# Patient Record
Sex: FEMALE | Race: WHITE | NOT HISPANIC OR LATINO | URBAN - METROPOLITAN AREA
[De-identification: names, ages, dates, MRNs, and addresses within clinical notes are randomized per-mention and may not be internally consistent; named-entity substitution may affect disease eponyms.]

---

## 2021-12-27 ENCOUNTER — INPATIENT (INPATIENT)
Facility: HOSPITAL | Age: 86
LOS: 14 days | Discharge: NURSING, REHAB OR LONG-TERM CARE | DRG: 177 | End: 2022-01-11
Attending: INTERNAL MEDICINE | Admitting: INTERNAL MEDICINE
Payer: MEDICARE

## 2021-12-27 VITALS
SYSTOLIC BLOOD PRESSURE: 165 MMHG | RESPIRATION RATE: 20 BRPM | HEIGHT: 61 IN | DIASTOLIC BLOOD PRESSURE: 94 MMHG | HEART RATE: 108 BPM | TEMPERATURE: 99 F | OXYGEN SATURATION: 91 % | WEIGHT: 179.9 LBS

## 2021-12-27 DIAGNOSIS — R09.02 HYPOXEMIA: ICD-10-CM

## 2021-12-27 PROBLEM — Z00.00 ENCOUNTER FOR PREVENTIVE HEALTH EXAMINATION: Status: ACTIVE | Noted: 2021-12-27

## 2021-12-27 LAB
ALBUMIN SERPL ELPH-MCNC: 3.2 G/DL — LOW (ref 3.3–5)
ALP SERPL-CCNC: 66 U/L — SIGNIFICANT CHANGE UP (ref 40–120)
ALT FLD-CCNC: 25 U/L — SIGNIFICANT CHANGE UP (ref 10–45)
ANION GAP SERPL CALC-SCNC: 14 MMOL/L — SIGNIFICANT CHANGE UP (ref 5–17)
APTT BLD: 26.3 SEC — LOW (ref 27.5–35.5)
AST SERPL-CCNC: 48 U/L — HIGH (ref 10–40)
BASE EXCESS BLDV CALC-SCNC: 3.2 MMOL/L — HIGH (ref -2–2)
BASOPHILS # BLD AUTO: 0.02 K/UL — SIGNIFICANT CHANGE UP (ref 0–0.2)
BASOPHILS NFR BLD AUTO: 0.2 % — SIGNIFICANT CHANGE UP (ref 0–2)
BILIRUB SERPL-MCNC: 0.5 MG/DL — SIGNIFICANT CHANGE UP (ref 0.2–1.2)
BUN SERPL-MCNC: 20 MG/DL — SIGNIFICANT CHANGE UP (ref 7–23)
CA-I SERPL-SCNC: 1.17 MMOL/L — SIGNIFICANT CHANGE UP (ref 1.15–1.33)
CALCIUM SERPL-MCNC: 9 MG/DL — SIGNIFICANT CHANGE UP (ref 8.4–10.5)
CHLORIDE BLDV-SCNC: 100 MMOL/L — SIGNIFICANT CHANGE UP (ref 96–108)
CHLORIDE SERPL-SCNC: 101 MMOL/L — SIGNIFICANT CHANGE UP (ref 96–108)
CO2 BLDV-SCNC: 30 MMOL/L — HIGH (ref 22–26)
CO2 SERPL-SCNC: 22 MMOL/L — SIGNIFICANT CHANGE UP (ref 22–31)
CREAT SERPL-MCNC: 0.88 MG/DL — SIGNIFICANT CHANGE UP (ref 0.5–1.3)
CRP SERPL-MCNC: 133 MG/L — HIGH (ref 0–4)
D DIMER BLD IA.RAPID-MCNC: 1014 NG/ML DDU — HIGH
D DIMER BLD IA.RAPID-MCNC: 861 NG/ML DDU — HIGH
EOSINOPHIL # BLD AUTO: 0.01 K/UL — SIGNIFICANT CHANGE UP (ref 0–0.5)
EOSINOPHIL NFR BLD AUTO: 0.1 % — SIGNIFICANT CHANGE UP (ref 0–6)
GAS PNL BLDV: 130 MMOL/L — LOW (ref 136–145)
GAS PNL BLDV: SIGNIFICANT CHANGE UP
GAS PNL BLDV: SIGNIFICANT CHANGE UP
GLUCOSE BLDV-MCNC: 106 MG/DL — HIGH (ref 70–99)
GLUCOSE SERPL-MCNC: 107 MG/DL — HIGH (ref 70–99)
HCO3 BLDV-SCNC: 28 MMOL/L — SIGNIFICANT CHANGE UP (ref 22–29)
HCT VFR BLD CALC: 41.5 % — SIGNIFICANT CHANGE UP (ref 34.5–45)
HCT VFR BLDA CALC: 43 % — SIGNIFICANT CHANGE UP (ref 34.5–46.5)
HGB BLD CALC-MCNC: 14.4 G/DL — SIGNIFICANT CHANGE UP (ref 11.7–16.1)
HGB BLD-MCNC: 13.4 G/DL — SIGNIFICANT CHANGE UP (ref 11.5–15.5)
IMM GRANULOCYTES NFR BLD AUTO: 0.7 % — SIGNIFICANT CHANGE UP (ref 0–1.5)
INR BLD: 1.09 RATIO — SIGNIFICANT CHANGE UP (ref 0.88–1.16)
LACTATE BLDV-MCNC: 2.2 MMOL/L — HIGH (ref 0.7–2)
LYMPHOCYTES # BLD AUTO: 1.19 K/UL — SIGNIFICANT CHANGE UP (ref 1–3.3)
LYMPHOCYTES # BLD AUTO: 13.2 % — SIGNIFICANT CHANGE UP (ref 13–44)
MCHC RBC-ENTMCNC: 29.6 PG — SIGNIFICANT CHANGE UP (ref 27–34)
MCHC RBC-ENTMCNC: 32.3 GM/DL — SIGNIFICANT CHANGE UP (ref 32–36)
MCV RBC AUTO: 91.6 FL — SIGNIFICANT CHANGE UP (ref 80–100)
MONOCYTES # BLD AUTO: 0.78 K/UL — SIGNIFICANT CHANGE UP (ref 0–0.9)
MONOCYTES NFR BLD AUTO: 8.6 % — SIGNIFICANT CHANGE UP (ref 2–14)
NEUTROPHILS # BLD AUTO: 6.96 K/UL — SIGNIFICANT CHANGE UP (ref 1.8–7.4)
NEUTROPHILS NFR BLD AUTO: 77.2 % — HIGH (ref 43–77)
NRBC # BLD: 0 /100 WBCS — SIGNIFICANT CHANGE UP (ref 0–0)
NT-PROBNP SERPL-SCNC: 169 PG/ML — SIGNIFICANT CHANGE UP (ref 0–300)
NT-PROBNP SERPL-SCNC: 185 PG/ML — SIGNIFICANT CHANGE UP (ref 0–300)
PCO2 BLDV: 45 MMHG — HIGH (ref 39–42)
PH BLDV: 7.41 — SIGNIFICANT CHANGE UP (ref 7.32–7.43)
PLATELET # BLD AUTO: 190 K/UL — SIGNIFICANT CHANGE UP (ref 150–400)
PO2 BLDV: 20 MMHG — LOW (ref 25–45)
POTASSIUM BLDV-SCNC: 3.9 MMOL/L — SIGNIFICANT CHANGE UP (ref 3.5–5.1)
POTASSIUM SERPL-MCNC: 3.8 MMOL/L — SIGNIFICANT CHANGE UP (ref 3.5–5.3)
POTASSIUM SERPL-SCNC: 3.8 MMOL/L — SIGNIFICANT CHANGE UP (ref 3.5–5.3)
PROCALCITONIN SERPL-MCNC: 0.17 NG/ML — HIGH (ref 0.02–0.1)
PROT SERPL-MCNC: 6.8 G/DL — SIGNIFICANT CHANGE UP (ref 6–8.3)
PROTHROM AB SERPL-ACNC: 13 SEC — SIGNIFICANT CHANGE UP (ref 10.6–13.6)
RAPID RVP RESULT: DETECTED
RBC # BLD: 4.53 M/UL — SIGNIFICANT CHANGE UP (ref 3.8–5.2)
RBC # FLD: 14.4 % — SIGNIFICANT CHANGE UP (ref 10.3–14.5)
SAO2 % BLDV: 37.1 % — LOW (ref 67–88)
SARS-COV-2 RNA SPEC QL NAA+PROBE: DETECTED
SODIUM SERPL-SCNC: 137 MMOL/L — SIGNIFICANT CHANGE UP (ref 135–145)
TROPONIN T, HIGH SENSITIVITY RESULT: 31 NG/L — SIGNIFICANT CHANGE UP (ref 0–51)
WBC # BLD: 9.02 K/UL — SIGNIFICANT CHANGE UP (ref 3.8–10.5)
WBC # FLD AUTO: 9.02 K/UL — SIGNIFICANT CHANGE UP (ref 3.8–10.5)

## 2021-12-27 PROCEDURE — 93010 ELECTROCARDIOGRAM REPORT: CPT

## 2021-12-27 PROCEDURE — 71250 CT THORAX DX C-: CPT | Mod: 26

## 2021-12-27 PROCEDURE — 99285 EMERGENCY DEPT VISIT HI MDM: CPT | Mod: CS

## 2021-12-27 PROCEDURE — 71045 X-RAY EXAM CHEST 1 VIEW: CPT | Mod: 26

## 2021-12-27 RX ORDER — ALBUTEROL 90 UG/1
1 AEROSOL, METERED ORAL
Refills: 0 | Status: DISCONTINUED | OUTPATIENT
Start: 2021-12-27 | End: 2021-12-28

## 2021-12-27 RX ORDER — REMDESIVIR 5 MG/ML
100 INJECTION INTRAVENOUS EVERY 24 HOURS
Refills: 0 | Status: COMPLETED | OUTPATIENT
Start: 2021-12-28 | End: 2021-12-31

## 2021-12-27 RX ORDER — DEXAMETHASONE 0.5 MG/5ML
6 ELIXIR ORAL DAILY
Refills: 0 | Status: DISCONTINUED | OUTPATIENT
Start: 2021-12-28 | End: 2022-01-09

## 2021-12-27 RX ORDER — ENOXAPARIN SODIUM 100 MG/ML
40 INJECTION SUBCUTANEOUS EVERY 12 HOURS
Refills: 0 | Status: DISCONTINUED | OUTPATIENT
Start: 2021-12-27 | End: 2022-01-11

## 2021-12-27 RX ORDER — ENOXAPARIN SODIUM 100 MG/ML
40 INJECTION SUBCUTANEOUS DAILY
Refills: 0 | Status: DISCONTINUED | OUTPATIENT
Start: 2021-12-27 | End: 2021-12-27

## 2021-12-27 RX ORDER — REMDESIVIR 5 MG/ML
INJECTION INTRAVENOUS
Refills: 0 | Status: COMPLETED | OUTPATIENT
Start: 2021-12-28 | End: 2021-12-31

## 2021-12-27 RX ORDER — DEXAMETHASONE 0.5 MG/5ML
6 ELIXIR ORAL ONCE
Refills: 0 | Status: COMPLETED | OUTPATIENT
Start: 2021-12-27 | End: 2021-12-27

## 2021-12-27 RX ORDER — REMDESIVIR 5 MG/ML
200 INJECTION INTRAVENOUS EVERY 24 HOURS
Refills: 0 | Status: COMPLETED | OUTPATIENT
Start: 2021-12-27 | End: 2021-12-27

## 2021-12-27 RX ORDER — BUDESONIDE AND FORMOTEROL FUMARATE DIHYDRATE 160; 4.5 UG/1; UG/1
2 AEROSOL RESPIRATORY (INHALATION)
Refills: 0 | Status: DISCONTINUED | OUTPATIENT
Start: 2021-12-27 | End: 2022-01-10

## 2021-12-27 RX ADMIN — Medication 6 MILLIGRAM(S): at 16:21

## 2021-12-27 RX ADMIN — REMDESIVIR 200 MILLIGRAM(S): 5 INJECTION INTRAVENOUS at 21:56

## 2021-12-27 NOTE — ED ADULT NURSE REASSESSMENT NOTE - NS ED NURSE REASSESS COMMENT FT1
Report given to RN on [facility], notified of Remdesivir order and that pharmacy will send dose to [facility] for administration. Pharmacy called to send dose.

## 2021-12-27 NOTE — ED PROVIDER NOTE - NS ED ROS FT
Constitutional: fevers; no chills  HEENT: no visual changes, no sore throat, no rhinorrhea  CV: no cp; no palpitations  Resp: no sob; no cough  GI: no abd pain, no nausea, no vomiting, no diarrhea, no constipation  : no dysuria, no hematuria  MSK: no myalgias; no arthralgias  skin: no rashes  neuro: no HA, no numbness; no weakness, no tingling  endo: no polyuria, no polydipsia Constitutional: fevers; no chills  HEENT: no visual changes, no sore throat, no rhinorrhea  CV: no cp; no palpitations  Resp: no sob; no cough  GI: no abd pain, no nausea, no vomiting, no diarrhea, no constipation  : no dysuria, no hematuria  MSK: no myalgias; no arthralgias  skin: no rashes  neuro: no HA, no numbness; no weakness, no tingling  endo: no polyuria, no polydipsia    All other systems negative

## 2021-12-27 NOTE — ED PROVIDER NOTE - OBJECTIVE STATEMENT
87 yo F with no reported PMHx, presents to the ED for generalized weakness for the past few days. Pt has also had subjective fevers and her daughter called 911. EMS found the pt to be hypoxic to low 80s on RA, placed on NRB. Here, pt improved to high 90s on 4L NC. She denies SOB, CP, dizziness, n/v/d, abd pain, leg swelling.   Pt has not seen a doctor in over 6 years but pt has followed cardiology in the past, Dr. Elliot Hemphill.   Daughter Daxa 87 yo F with no reported PMHx, presents to the ED for generalized weakness for the past few days. Pt has also had subjective fevers and her daughter called 911. EMS found the pt to be hypoxic to low 80s on RA, placed on NRB. Here, pt improved to high 90s on 4L NC. She denies SOB, CP, dizziness, n/v/d, abd pain, leg swelling.   Pt has not seen a doctor in over 6 years but pt has followed cardiology in the past, Dr. Elliot De La Cruz.   Daughter Daxa 87 yo F with no reported PMHx, presents to the ED for generalized weakness for the past few days. Pt has also had subjective fevers and her daughter called 911. EMS found the pt to be hypoxic to low 80s on RA, placed on NRB. Here, pt improved to high 90s on 4L NC. She denies SOB, CP, dizziness, n/v/d, abd pain, leg swelling.   Pt has not seen a doctor in over 6 years but pt has followed cardiology in the past, Dr. Elliot De La Cruz.   Daughter Daxa [phone]

## 2021-12-27 NOTE — ED PROVIDER NOTE - PHYSICAL EXAMINATION
PHYSICAL EXAM:  GENERAL: non-toxic appearing; in no respiratory distress  HEAD Atraumatic, Normocephalic  NECK: No JVD; trachea midline  EYES: PERRL, EOMs intact b/l w/out deficits; normal conjunctiva  CHEST/LUNG: diffuse crackles/rales; hypoxic on RA to mid 80s; improved to high 90s on 4L NC  HEART: RRR no murmur/gallops/rubs  ABDOMEN: +BS, soft, NT, ND  EXTREMITIES: No LE edema, +2 radial pulses b/l, +2 DP/PT pulses b/l  MUSCULOSKELETAL: FROM of all 4 extremities;  NERVOUS SYSTEM:  A&Ox3, No motor deficits or sensory deficits; CNII-XII intact; Speech is fluent and appropriate  SKIN:  Warm and dry as visualized PHYSICAL EXAM:  GENERAL: non-toxic appearing; in no respiratory distress but hypoxic  HEAD Atraumatic, Normocephalic  NECK: No JVD; trachea midline  EYES: PERRL, EOMs intact b/l w/out deficits; normal conjunctiva  CHEST/LUNG: diffuse crackles/rales; hypoxic on RA to mid 80s; improved to high 90s on 4L NC  HEART: RRR no murmur/gallops/rubs  ABDOMEN: +BS, soft, NT, ND  EXTREMITIES: No LE edema, +2 radial pulses b/l, +2 DP/PT pulses b/l  MUSCULOSKELETAL: FROM of all 4 extremities;  NERVOUS SYSTEM:  A&Ox3, No motor deficits or sensory deficits; CNII-XII intact; Speech is fluent and appropriate  SKIN:  Warm and dry as visualized

## 2021-12-27 NOTE — ED ADULT NURSE NOTE - NS ED NURSE RECORD ANOTHER HT AND WT
Patient seen in clinic for follow up anticoag visit. Patient given verbal as well as written instructions below. Please see encounter from 11/8/19.    Yes

## 2021-12-27 NOTE — H&P ADULT - NSHPPHYSICALEXAM_GEN_ALL_CORE
T(F): 98.2 (12-27-21 @ 19:11), Max: 98.8 (12-27-21 @ 15:44)  HR: 95 (12-27-21 @ 19:11) (88 - 108)  BP: 140/92 (12-27-21 @ 19:11) (140/92 - 165/94)  RR: 15 (12-27-21 @ 19:11) (15 - 34)  SpO2: 94% (12-27-21 @ 19:11) (91% - 100%)    PHYSICAL EXAM:  GENERAL: NAD, well-developed  HEAD:  Atraumatic, Normocephalic  EYES: EOMI, PERRLA, conjunctiva and sclera clear  NECK: Supple, No JVD  CHEST/LUNG: Clear to auscultation bilaterally; No wheeze  HEART: Regular rate and rhythm; No murmurs, rubs, or gallops  ABDOMEN: Soft, Nontender, Nondistended; Bowel sounds present  EXTREMITIES:  2+ Peripheral Pulses, No clubbing, cyanosis, or edema  PSYCH: AAOx3  NEUROLOGY: non-focal  SKIN: No rashes or lesions

## 2021-12-27 NOTE — H&P ADULT - HISTORY OF PRESENT ILLNESS
85 yo F with no reported PMHx, presents to the ED for generalized weakness for the past few days. Pt has also had subjective fevers and her daughter called 911. EMS found the pt to be hypoxic to low 80s on RA, placed on NRB. Here, pt improved to high 90s on 4L NC. She denies SOB, CP, dizziness, n/v/d, abd pain, leg swelling.   Pt has not seen a doctor in over 6 years but pt has followed cardiology in the past, Dr. Elliot Caballero.   Daughter Daxa [phone]

## 2021-12-27 NOTE — ED PROVIDER NOTE - PROGRESS NOTE DETAILS
Kevin Morrissey MD. pt comfortable on 4L NC, sating mid 90s. pt given dexamethasone. spoke w/ dr. loza, unattached->states to admit to dr. garrett.

## 2021-12-27 NOTE — ED PROVIDER NOTE - CLINICAL SUMMARY MEDICAL DECISION MAKING FREE TEXT BOX
Kevin Morrissey MD. pt with no reported pmx presents for a few days of subjective fevers, generalizesd weakness. found to be hypoxic via EMS to mid 80s. improved to high 90s on 4L NC. coarse crackles/rales on lung exam. pt speaking in full sentences. concern for covid-19 pna. will provide dexamethasone, cxr, labs, plan for admission Kevin Morrissey MD. pt with no reported pmx presents for a few days of subjective fevers, generalized weakness. found to be hypoxic via EMS to mid 80s. improved to high 90s on 4L NC. coarse crackles/rales on lung exam. pt speaking in full sentences. concern for covid-19 pna. will provide dexamethasone, cxr, labs, plan for admission    Emma Mejia MD - Attending Physician: Pt here with Fever, weakness and now hypoxia. No significant increased WOB but significant hypoxia noted. Concern for COVID as likely cause. Labs, Xr, Dex, O2 as needed, to admit

## 2021-12-27 NOTE — H&P ADULT - NSCORESITESY/N_GEN_A_CORE_RD
No      DATE OF SERVICE: 11-07-21         cefuroxime  IVPB 1500 milliGRAM(s) IV Intermittent once  chlorhexidine 0.12% Liquid 30 milliLiter(s) Swish and Spit once  chlorhexidine 4% Liquid 1 Application(s) Topical once  diltiazem    Tablet 30 milliGRAM(s) Oral every 6 hours  diltiazem Infusion 10 mG/Hr IV Continuous <Continuous>  folic acid 1 milliGRAM(s) Oral daily  furosemide   Injectable 20 milliGRAM(s) IV Push daily  gabapentin 300 milliGRAM(s) Oral two times a day  heparin  Infusion 700 Unit(s)/Hr IV Continuous <Continuous>  mirtazapine 45 milliGRAM(s) Oral daily  mupirocin 2% Nasal 1 Application(s) Nasal every 12 hours  sodium chloride 0.9% lock flush 3 milliLiter(s) IV Push every 8 hours                            14.1   6.90  )-----------( 114      ( 07 Nov 2021 06:59 )             42.5       Hemoglobin: 14.1 g/dL (11-07 @ 06:59)  Hemoglobin: 14.2 g/dL (11-06 @ 07:03)  Hemoglobin: 14.2 g/dL (11-05 @ 22:35)  Hemoglobin: 15.0 g/dL (11-05 @ 14:34)  Hemoglobin: 15.3 g/dL (11-05 @ 06:29)      11-07    139  |  103  |  22  ----------------------------<  101<H>  4.2   |  22  |  0.95    Ca    9.2      07 Nov 2021 06:55    TPro  6.4  /  Alb  4.1  /  TBili  0.5  /  DBili  x   /  AST  71<H>  /  ALT  89<H>  /  AlkPhos  111  11-07    Creatinine Trend: 0.95<--, 0.96<--, 0.92<--, 1.09<--, 1.29<--, 0.99<--    COAGS: PTT - ( 07 Nov 2021 08:26 )  PTT:128.5 sec          T(C): 36.4 (11-07-21 @ 04:36), Max: 36.7 (11-06-21 @ 11:55)  HR: 102 (11-07-21 @ 04:36) (76 - 103)  BP: 125/89 (11-07-21 @ 04:36) (108/72 - 130/79)  RR: 18 (11-07-21 @ 04:36) (18 - 18)  SpO2: 92% (11-07-21 @ 04:36) (91% - 94%)  Wt(kg): --    I&O's Summary    06 Nov 2021 08:01  -  07 Nov 2021 07:00  --------------------------------------------------------  IN: 656 mL / OUT: 600 mL / NET: 56 mL    07 Nov 2021 07:01  -  07 Nov 2021 10:25  --------------------------------------------------------  IN: 262 mL / OUT: 0 mL / NET: 262 mL      HEENT:  (-)icterus (-)pallor  CV: N S1 S2 1/6 ZITA (+)2 Pulses B/l  Resp:  Clear to ausculatation B/L, normal effort  GI: (+) BS Soft, NT, ND  Lymph:  (-)Edema, (-)obvious lymphadenopathy  Skin: Warm to touch, Normal turgor  Psych: Appropriate mood and affect      TELEMETRY: 	  Afib 80's    ASSESSMENT/PLAN: 	77y  Female pmh of HTN, HLD, varicose veins, vertigo, who presented to the ED with complains of SOB found with pulmonary edema and new afib    - OR monday for MVR, RF ablation   -Pt. with mild CAD on cath - medical management recommended  -DOUG performed - discussed with echo attending - pt. with severe MR  - cont daily diuresis   -  GI / DVT prophylaxis.  - keep K>4, mag >2.0   -Continue with ac for AF

## 2021-12-27 NOTE — ED ADULT NURSE REASSESSMENT NOTE - NS ED NURSE REASSESS COMMENT FT1
Assumed care of pt at this time. Bedside report received from LILLY Weiss. Pt resting comfortably in stretcher at this time with no complaints. Pt well appearing and VSS. Stretcher locked and lowered and call bell within reach.

## 2021-12-27 NOTE — H&P ADULT - ASSESSMENT
87 yo F with no reported PMHx, presents to the ED for generalized weakness for the past few days. Pt has also had subjective fevers and her daughter called 911. EMS found the pt to be hypoxic to low 80s on RA, placed on NRB. Here, pt improved to high 90s on 4L NC. She denies SOB, CP, dizziness, n/v/d, abd pain, leg swelling.   ptn is covid positive, has acute hypoxic respiratory failure, ID and Pulm called, chest ct ordered, DDimer is close to 900, will place on lovenox 40 mg bid, Symbicort and proventil HFA, remdesivir and dexamethasone iv. on cont O2NC 4L

## 2021-12-27 NOTE — ED ADULT NURSE NOTE - OBJECTIVE STATEMENT
87 y/o female arrives to the ER  brought in by ambulance from home complaining of weakness.  PMH of   Pt is A&Ox4, speaking coherently. Pt states having weakness, low grade fever over the last few days, EMS found pt to be hypoxic SPO2 83 RA  Pt denies SOB, chest pain, dizziness, N/V/D, urinary symptoms, fevers, chills.    On assessment airway is patent, breathing spontaneously and unlabored. Skin is  83 RAdry, warm and color appropriate to race.  Abdomen is soft, no distended, no tender. Full ROM in all extremities.  Patient undressed and placed into gown, side rails up with bed locked and in lowest position for safety. call bell within reach. [place] provided. Comfort and safety provided. Educated not to ambulate without assistance. will continue to reassess. 85 y/o female arrives to the ER brought in by ambulance from home complaining of weakness. No pertinent medical history. Pt is A&Ox3, speaking coherently. Pt states having weakness, low grade fever over the last few days, EMS found pt to be hypoxic SPO2 83 RA. At arrival pt SPO2 is 88 RA placed on NC 4 liter improving to 99% Pt placed on continuous pulse ox and cardiac monitor. Pt denies SOB, chest pain, dizziness, N/V/D, urinary symptoms.  On assessment airway is patent, breathing spontaneously and unlabored. Skin is dry warm and color appropriate to race.  Abdomen is soft, no distended, no tender. Full ROM in all extremities.  Patient undressed and placed into gown, side rails up with bed locked and in lowest position for safety. call bell within reach. [place] provided. Comfort and safety provided. Educated not to ambulate without assistance. will continue to reassess.

## 2021-12-28 ENCOUNTER — TRANSCRIPTION ENCOUNTER (OUTPATIENT)
Age: 86
End: 2021-12-28

## 2021-12-28 LAB
A1C WITH ESTIMATED AVERAGE GLUCOSE RESULT: 6.3 % — HIGH (ref 4–5.6)
ALBUMIN SERPL ELPH-MCNC: 3 G/DL — LOW (ref 3.3–5)
ALP SERPL-CCNC: 59 U/L — SIGNIFICANT CHANGE UP (ref 40–120)
ALT FLD-CCNC: 21 U/L — SIGNIFICANT CHANGE UP (ref 10–45)
ANION GAP SERPL CALC-SCNC: 13 MMOL/L — SIGNIFICANT CHANGE UP (ref 5–17)
AST SERPL-CCNC: 31 U/L — SIGNIFICANT CHANGE UP (ref 10–40)
BILIRUB DIRECT SERPL-MCNC: 0.1 MG/DL — SIGNIFICANT CHANGE UP (ref 0–0.3)
BILIRUB INDIRECT FLD-MCNC: 0.2 MG/DL — SIGNIFICANT CHANGE UP (ref 0.2–1)
BILIRUB SERPL-MCNC: 0.3 MG/DL — SIGNIFICANT CHANGE UP (ref 0.2–1.2)
BUN SERPL-MCNC: 22 MG/DL — SIGNIFICANT CHANGE UP (ref 7–23)
CALCIUM SERPL-MCNC: 8.6 MG/DL — SIGNIFICANT CHANGE UP (ref 8.4–10.5)
CHLORIDE SERPL-SCNC: 103 MMOL/L — SIGNIFICANT CHANGE UP (ref 96–108)
CO2 SERPL-SCNC: 23 MMOL/L — SIGNIFICANT CHANGE UP (ref 22–31)
CREAT SERPL-MCNC: 0.88 MG/DL — SIGNIFICANT CHANGE UP (ref 0.5–1.3)
CREAT SERPL-MCNC: 0.91 MG/DL — SIGNIFICANT CHANGE UP (ref 0.5–1.3)
ESTIMATED AVERAGE GLUCOSE: 134 MG/DL — HIGH (ref 68–114)
FERRITIN SERPL-MCNC: 799 NG/ML — HIGH (ref 15–150)
GLUCOSE SERPL-MCNC: 219 MG/DL — HIGH (ref 70–99)
HCT VFR BLD CALC: 39.8 % — SIGNIFICANT CHANGE UP (ref 34.5–45)
HGB BLD-MCNC: 12.4 G/DL — SIGNIFICANT CHANGE UP (ref 11.5–15.5)
MCHC RBC-ENTMCNC: 29 PG — SIGNIFICANT CHANGE UP (ref 27–34)
MCHC RBC-ENTMCNC: 31.2 GM/DL — LOW (ref 32–36)
MCV RBC AUTO: 93.2 FL — SIGNIFICANT CHANGE UP (ref 80–100)
NRBC # BLD: 0 /100 WBCS — SIGNIFICANT CHANGE UP (ref 0–0)
PLATELET # BLD AUTO: 199 K/UL — SIGNIFICANT CHANGE UP (ref 150–400)
POTASSIUM SERPL-MCNC: 3.9 MMOL/L — SIGNIFICANT CHANGE UP (ref 3.5–5.3)
POTASSIUM SERPL-SCNC: 3.9 MMOL/L — SIGNIFICANT CHANGE UP (ref 3.5–5.3)
PROT SERPL-MCNC: 6.5 G/DL — SIGNIFICANT CHANGE UP (ref 6–8.3)
RBC # BLD: 4.27 M/UL — SIGNIFICANT CHANGE UP (ref 3.8–5.2)
RBC # FLD: 14.6 % — HIGH (ref 10.3–14.5)
SODIUM SERPL-SCNC: 139 MMOL/L — SIGNIFICANT CHANGE UP (ref 135–145)
TSH SERPL-MCNC: 0.33 UIU/ML — SIGNIFICANT CHANGE UP (ref 0.27–4.2)
WBC # BLD: 7.52 K/UL — SIGNIFICANT CHANGE UP (ref 3.8–10.5)
WBC # FLD AUTO: 7.52 K/UL — SIGNIFICANT CHANGE UP (ref 3.8–10.5)

## 2021-12-28 RX ORDER — INFLUENZA VIRUS VACCINE 15; 15; 15; 15 UG/.5ML; UG/.5ML; UG/.5ML; UG/.5ML
0.7 SUSPENSION INTRAMUSCULAR ONCE
Refills: 0 | Status: DISCONTINUED | OUTPATIENT
Start: 2021-12-28 | End: 2022-01-11

## 2021-12-28 RX ORDER — TIOTROPIUM BROMIDE 18 UG/1
1 CAPSULE ORAL; RESPIRATORY (INHALATION) DAILY
Refills: 0 | Status: DISCONTINUED | OUTPATIENT
Start: 2021-12-28 | End: 2022-01-10

## 2021-12-28 RX ADMIN — REMDESIVIR 500 MILLIGRAM(S): 5 INJECTION INTRAVENOUS at 18:08

## 2021-12-28 RX ADMIN — ALBUTEROL 1 PUFF(S): 90 AEROSOL, METERED ORAL at 06:17

## 2021-12-28 RX ADMIN — ENOXAPARIN SODIUM 40 MILLIGRAM(S): 100 INJECTION SUBCUTANEOUS at 06:17

## 2021-12-28 RX ADMIN — BUDESONIDE AND FORMOTEROL FUMARATE DIHYDRATE 2 PUFF(S): 160; 4.5 AEROSOL RESPIRATORY (INHALATION) at 06:18

## 2021-12-28 RX ADMIN — Medication 6 MILLIGRAM(S): at 05:50

## 2021-12-28 RX ADMIN — Medication 1200 MILLIGRAM(S): at 18:08

## 2021-12-28 RX ADMIN — BUDESONIDE AND FORMOTEROL FUMARATE DIHYDRATE 2 PUFF(S): 160; 4.5 AEROSOL RESPIRATORY (INHALATION) at 18:08

## 2021-12-28 RX ADMIN — ENOXAPARIN SODIUM 40 MILLIGRAM(S): 100 INJECTION SUBCUTANEOUS at 18:08

## 2021-12-28 RX ADMIN — ALBUTEROL 1 PUFF(S): 90 AEROSOL, METERED ORAL at 00:07

## 2021-12-28 RX ADMIN — TIOTROPIUM BROMIDE 1 CAPSULE(S): 18 CAPSULE ORAL; RESPIRATORY (INHALATION) at 15:58

## 2021-12-28 NOTE — CONSULT NOTE ADULT - SUBJECTIVE AND OBJECTIVE BOX
[facility] - [facility] - CONSULT NOTE    HPI: 86 year old gentlewoman, lifelong non-smoker, without history of intrinsic lung disease. The patient is fully vaccinated against COVID. She comes to the ER with several days of weakness, fatigue and malaise. She has had fevers without chills or sweats. She has shortness of breath and found to be hypoxic on room air by EMS down to the low 80s. She was placed on 100% NRB an brought to the ER.    PMHX:  Hiatal hernia  Degenerative joint disease    PSHX:  No significant past surgical history    FAMILY HISTORY:      SOCIAL HISTORY:  lifelong non-smoker    Pulmonary Medications:   ALBUTerol    90 MICROgram(s) HFA Inhaler 1 Puff(s) Inhalation four times a day  budesonide 160 MICROgram(s)/formoterol 4.5 MICROgram(s) Inhaler 2 Puff(s) Inhalation two times a day      Antimicrobials:  remdesivir  IVPB   IV Intermittent   remdesivir  IVPB 100 milliGRAM(s) IV Intermittent every 24 hours      Cardiology:      Other:  dexAMETHasone  Injectable 6 milliGRAM(s) IV Push daily  enoxaparin Injectable 40 milliGRAM(s) SubCutaneous every 12 hours  influenza  Vaccine (HIGH DOSE) 0.7 milliLiter(s) IntraMuscular once      Prn:  MEDICATIONS  (PRN):      Allergies    No Known Allergies    HOME MEDICATIONS: see  H & P    REVIEW OF SYSTEMS:  Constitutional: As per HPI  HEENT: Within normal limits  CV: As per HPI  Resp: As per HPI  GI: Within normal limits   : Within normal limits  Musculoskeletal: Within normal limits  Skin: Within normal limits  Neurological: Within normal limits  Psychiatric: Within normal limits  Endocrine: Within normal limits  Hematologic/Lymphatic: Within normal limits  Allergic/Immunologic: Within normal limits    [x] All other systems negative    OBJECTIVE:    I&O's Detail    27 Dec 2021 07:01  -  28 Dec 2021 06:13  --------------------------------------------------------  IN:    IV PiggyBack: 250 mL    Oral Fluid: 300 mL  Total IN: 550 mL    OUT:    Blood Loss (mL): 0 mL    Voided (mL): 400 mL  Total OUT: 400 mL    Total NET: 150 mL    Daily Height in cm: 165.1 (27 Dec 2021 21:36)      PHYSICAL EXAM:  ICU Vital Signs Last 24 Hrs  T(C): 36.4 (28 Dec 2021 05:26), Max: 37.2 (27 Dec 2021 23:44)  T(F): 97.5 (28 Dec 2021 05:26), Max: 99 (27 Dec 2021 23:44)  HR: 80 (28 Dec 2021 05:26) (80 - 108)  BP: 125/81 (28 Dec 2021 05:26) (122/68 - 165/94)  BP(mean): 106 (27 Dec 2021 19:11) (106 - 106)  ABP: --  ABP(mean): --  RR: 17 (28 Dec 2021 05:26) (15 - 34)  SpO2: 97% (28 Dec 2021 05:26) (91% - 100%)    General: Awake. Alert. Cooperative. No distress. Appears stated age 	  HEENT:   Atraumatic. Normocephalic. Anicteric. Normal oral mucosa. PERRL. EOMI.  Neck: Supple. Trachea midline. Thyroid without enlargement/tenderness/nodules. No carotid bruit. No JVD.	  Cardiovascular: Regular rate and rhythm. S1 S2 normal. No murmurs, rubs or gallops.  Respiratory: Respirations unlabored. Clear to auscultation and percussion bilaterally. No curvature.  Abdomen: Soft. Non-tender. Non-distended. No organomegaly. No masses. Normal bowel sounds.  Extremities: Warm to touch. No clubbing or cyanosis. No pedal edema.  Pulses: 2+ peripheral pulses all extremities.	  Skin: Normal skin color. No rashes or lesions. No ecchymoses. No cyanosis. Warm to touch.  Lymph Nodes: Cervical, supraclavicular and axillary nodes normal  Neurological: Motor and sensory examination equal and normal. A and O x 3  Psychiatry: Appropriate mood and affect.      LABS:                          13.4   9.02  )-----------( 190      ( 27 Dec 2021 16:51 )             41.5     CBC    WBC  9.02 <==    Hemoglobin  13.4 <<==    Hematocrit  41.5 <==    Platelets  190 <==      137  |  101  |  20  ----------------------------<  107<H>    12-27  3.8   |  22  |  0.88    LYTES    sodium  137 <==    potassium   3.8 <==    chloride  101 <==    carbon dioxide  22 <==    =============================================================================================  RENAL FUNCTION:    Creatinine:   0.88  <<==    BUN:   20 <==    ============================================================================================    calcium   9.0 <==    ============================================================================================  LFTs    AST:   48 <==     ALT:  25  <==     AP:  66  <=    Bili:  0.5  <=    PT/INR - ( 27 Dec 2021 16:51 )   PT: 13.0 sec;   INR: 1.09 ratio       PTT - ( 27 Dec 2021 16:51 )  PTT:26.3 sec    Venous Blood Gas:  12-27 @ 16:50  7.41/45/20/28/37.1  VBG Lactate: 2.2    Procalcitonin, Serum: 0.17 ng/mL (12-27 @ 16:51)    Serum Pro-Brain Natriuretic Peptide: 185 pg/mL (12-27 @ 19:30)  Serum Pro-Brain Natriuretic Peptide: 169 pg/mL (12-27 @ 16:51)      D-Dimer Assay, Quantitative: 1014 ng/mL DDU (12-27 @ 19:30)    D-Dimer Assay, Quantitative: 861 ng/mL DDU (12-27 @ 16:51)      C-Reactive Protein, Serum (12.27.21 @ 16:51)   C-Reactive Protein, Serum: 133 mg/L     Ferritin, Serum (12.27.21 @ 23:22)   Ferritin, Serum: 799 ng/mL     CARDIAC MARKERS ( 27 Dec 2021 16:51 )  CPK x     /CKMB x     /CKMB Units x        troponin 31 ng/L      MICROBIOLOGY:     Respiratory Viral Panel with COVID-19 by MONTY (12.27.21 @ 16:49)   Rapid RVP Result: Detected   SARS-CoV-2: Detected:      RADIOLOGY:  [x ] Chest radiographs reviewed and interpreted by me    EXAM:  XR CHEST PORTABLE URGENT 1V                          PROCEDURE DATE:  12/27/2021      INTERPRETATION:  CLINICAL INFORMATION: Shortness of breath, cough and   fever    TECHNIQUE: AP view(s) of the chest.    COMPARISON: No comparison available.    FINDINGS:  The cardiac silhouette is normal in size. Bilateral lower lung patchy   airspace opacities. No pleural effusion or pneumothorax. No acute bony   abnormality.    IMPRESSION:  Bilateral lower lung patchy airspace opacities.    NICK DENT DO; Resident Radiologist  This document has been electronically signed.  CANDIDO GARCIA MD; Attending Radiologist  This document has been electronically signed. Dec 28 2021 12:06AM    ---------------------------------------------------------------------------------------------------------------       [facility] - [facility] - CONSULT NOTE    HPI: 86 year old gentlewoman, lifelong non-smoker, without history of intrinsic lung disease. The patient remains unvaccinated against COVID and has not received medical care in many years. She comes to the ER with several days of weakness, fatigue and malaise. She has had fevers without chills or sweats. She has mild shortness of breath and found to be hypoxic on room air by EMS down to the low 80s. She has chest congestion or wheeze. She has no chest pain/pressure or palpitations. She was placed on 100% NRB and brought to the ER. COVID PCR nasal swab is (+). CXR reveals bilateral infiltrates. Asked to evaluate.    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     Pulmonary Medications:   ALBUTerol    90 MICROgram(s) HFA Inhaler 1 Puff(s) Inhalation four times a day  budesonide 160 MICROgram(s)/formoterol 4.5 MICROgram(s) Inhaler 2 Puff(s) Inhalation two times a day      Antimicrobials:  remdesivir  IVPB   IV Intermittent   remdesivir  IVPB 100 milliGRAM(s) IV Intermittent every 24 hours      Cardiology:      Other:  dexAMETHasone  Injectable 6 milliGRAM(s) IV Push daily  enoxaparin Injectable 40 milliGRAM(s) SubCutaneous every 12 hours  influenza  Vaccine (HIGH DOSE) 0.7 milliLiter(s) IntraMuscular once      Prn:  MEDICATIONS  (PRN):      Allergies    No Known Allergies    HOME MEDICATIONS: see  H & P    REVIEW OF SYSTEMS:  Constitutional: As per HPI  HEENT: Within normal limits  CV: As per HPI  Resp: As per HPI  GI: Within normal limits   : Within normal limits  Musculoskeletal: shoulder arthritis  Skin: Within normal limits  Neurological: Within normal limits  Psychiatric: Within normal limits  Endocrine: Within normal limits  Hematologic/Lymphatic: Within normal limits  Allergic/Immunologic: Within normal limits    [x] All other systems negative    OBJECTIVE:    I&O's Detail    27 Dec 2021 07:01  -  28 Dec 2021 06:13  --------------------------------------------------------  IN:    IV PiggyBack: 250 mL    Oral Fluid: 300 mL  Total IN: 550 mL    OUT:    Blood Loss (mL): 0 mL    Voided (mL): 400 mL  Total OUT: 400 mL    Total NET: 150 mL    Daily Height in cm: 165.1 (27 Dec 2021 21:36)      PHYSICAL EXAM:  ICU Vital Signs Last 24 Hrs  T(C): 36.4 (28 Dec 2021 05:26), Max: 37.2 (27 Dec 2021 23:44)  T(F): 97.5 (28 Dec 2021 05:26), Max: 99 (27 Dec 2021 23:44)  HR: 80 (28 Dec 2021 05:26) (80 - 108)  BP: 125/81 (28 Dec 2021 05:26) (122/68 - 165/94)  BP(mean): 106 (27 Dec 2021 19:11) (106 - 106)  ABP: --  ABP(mean): --  RR: 17 (28 Dec 2021 05:26) (15 - 34)  SpO2: 97% (28 Dec 2021 05:26) (91% - 100%) on 6lpm nasal canula    General: Awake. Alert. Cooperative. No distress. Appears stated age 	  HEENT: Atraumatic. Normocephalic. Anicteric. Normal oral mucosa. PERRL. EOMI.  Neck: Supple. Trachea midline. Thyroid without enlargement/tenderness/nodules. No carotid bruit. No JVD.	  Cardiovascular: Tachycardic rate and rhythm. S1 S2 normal. II/VI systolic murmur.  Respiratory: Respirations unlabored. Bibasilar rales. Diffuse wheeze. Kyphosis.  Abdomen: Soft. Non-tender. Non-distended. No organomegaly. No masses. Normal bowel sounds. Obese.  Extremities: Warm to touch. No clubbing or cyanosis. No pedal edema.  Pulses: 2+ peripheral pulses all extremities.	  Skin: Normal skin color. No rashes or lesions. No ecchymoses. No cyanosis. Warm to touch.  Lymph Nodes: Cervical, supraclavicular and axillary nodes normal  Neurological: Motor and sensory examination equal and normal. A and O x 3  Psychiatry: Appropriate mood and affect.      LABS:                          13.4   9.02  )-----------( 190      ( 27 Dec 2021 16:51 )             41.5     CBC    WBC  9.02 <==    Hemoglobin  13.4 <<==    Hematocrit  41.5 <==    Platelets  190 <==      137  |  101  |  20  ----------------------------<  107<H>    12-27  3.8   |  22  |  0.88    LYTES    sodium  137 <==    potassium   3.8 <==    chloride  101 <==    carbon dioxide  22 <==    =============================================================================================  RENAL FUNCTION:    Creatinine:   0.88  <<==    BUN:   20 <==    ============================================================================================    calcium   9.0 <==    ============================================================================================  LFTs    AST:   48 <==     ALT:  25  <==     AP:  66  <=    Bili:  0.5  <=    PT/INR - ( 27 Dec 2021 16:51 )   PT: 13.0 sec;   INR: 1.09 ratio       PTT - ( 27 Dec 2021 16:51 )  PTT:26.3 sec    Venous Blood Gas:  12-27 @ 16:50  7.41/45/20/28/37.1  VBG Lactate: 2.2    Procalcitonin, Serum: 0.17 ng/mL (12-27 @ 16:51)    Serum Pro-Brain Natriuretic Peptide: 185 pg/mL (12-27 @ 19:30)    Serum Pro-Brain Natriuretic Peptide: 169 pg/mL (12-27 @ 16:51)      D-Dimer Assay, Quantitative: 1014 ng/mL DDU (12-27 @ 19:30)    D-Dimer Assay, Quantitative: 861 ng/mL DDU (12-27 @ 16:51)      C-Reactive Protein, Serum (12.27.21 @ 16:51)   C-Reactive Protein, Serum: 133 mg/L     Ferritin, Serum (12.27.21 @ 23:22)   Ferritin, Serum: 799 ng/mL     CARDIAC MARKERS ( 27 Dec 2021 16:51 )  CPK x     /CKMB x     /CKMB Units x        troponin 31 ng/L      MICROBIOLOGY:     Respiratory Viral Panel with COVID-19 by MONTY (12.27.21 @ 16:49)   Rapid RVP Result: Detected   SARS-CoV-2: Detected:      RADIOLOGY:  [x ] Chest radiographs reviewed and interpreted by me    CT chest 12/27 - "to my eye" reveals bilateral multifocal peripheral groundglass opacities c/w COVID related pneumonia  ---------------------------------------------------------------------------------------------------------------    EXAM:  XR CHEST PORTABLE URGENT 1V                          PROCEDURE DATE:  12/27/2021      INTERPRETATION:  CLINICAL INFORMATION: Shortness of breath, cough and   fever    TECHNIQUE: AP view(s) of the chest.    COMPARISON: No comparison available.    FINDINGS:  The cardiac silhouette is normal in size. Bilateral lower lung patchy   airspace opacities. No pleural effusion or pneumothorax. No acute bony   abnormality.    IMPRESSION:  Bilateral lower lung patchy airspace opacities.    NICK DENT DO; Resident Radiologist  This document has been electronically signed.  CANDIDO GARCIA MD; Attending Radiologist  This document has been electronically signed. Dec 28 2021 12:06AM    ---------------------------------------------------------------------------------------------------------------       [facility] - [facility] - CONSULT NOTE    HPI: 86 year old gentlewoman, lifelong non-smoker, without history of intrinsic lung disease. The patient remains unvaccinated against COVID and has not received medical care in many years. She comes to the ER with several days of weakness, fatigue and malaise. She has had fevers without chills or sweats. She has mild shortness of breath and found to be hypoxic on room air by EMS down to the low 80s. She has chest congestion or wheeze. She has no chest pain/pressure or palpitations. She was placed on 100% NRB and brought to the ER. COVID PCR nasal swab is (+). CXR reveals bilateral infiltrates. Asked to evaluate.    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     Pulmonary Medications:   ALBUTerol    90 MICROgram(s) HFA Inhaler 1 Puff(s) Inhalation four times a day  budesonide 160 MICROgram(s)/formoterol 4.5 MICROgram(s) Inhaler 2 Puff(s) Inhalation two times a day      Antimicrobials:  remdesivir  IVPB   IV Intermittent   remdesivir  IVPB 100 milliGRAM(s) IV Intermittent every 24 hours      Cardiology:      Other:  dexAMETHasone  Injectable 6 milliGRAM(s) IV Push daily  enoxaparin Injectable 40 milliGRAM(s) SubCutaneous every 12 hours  influenza  Vaccine (HIGH DOSE) 0.7 milliLiter(s) IntraMuscular once      Prn:  MEDICATIONS  (PRN):      Allergies    No Known Allergies    HOME MEDICATIONS: see  H & P    REVIEW OF SYSTEMS:  Constitutional: As per HPI  HEENT: Within normal limits  CV: As per HPI  Resp: As per HPI  GI: Within normal limits   : Within normal limits  Musculoskeletal: shoulder arthritis  Skin: Within normal limits  Neurological: Within normal limits  Psychiatric: Within normal limits  Endocrine: Within normal limits  Hematologic/Lymphatic: Within normal limits  Allergic/Immunologic: Within normal limits    [x] All other systems negative    OBJECTIVE:    I&O's Detail    27 Dec 2021 07:01  -  28 Dec 2021 06:13  --------------------------------------------------------  IN:    IV PiggyBack: 250 mL    Oral Fluid: 300 mL  Total IN: 550 mL    OUT:    Blood Loss (mL): 0 mL    Voided (mL): 400 mL  Total OUT: 400 mL    Total NET: 150 mL    Daily Height in cm: 165.1 (27 Dec 2021 21:36)      PHYSICAL EXAM:  ICU Vital Signs Last 24 Hrs  T(C): 36.4 (28 Dec 2021 05:26), Max: 37.2 (27 Dec 2021 23:44)  T(F): 97.5 (28 Dec 2021 05:26), Max: 99 (27 Dec 2021 23:44)  HR: 80 (28 Dec 2021 05:26) (80 - 108)  BP: 125/81 (28 Dec 2021 05:26) (122/68 - 165/94)  BP(mean): 106 (27 Dec 2021 19:11) (106 - 106)  ABP: --  ABP(mean): --  RR: 17 (28 Dec 2021 05:26) (15 - 34)  SpO2: 97% (28 Dec 2021 05:26) (91% - 100%) on 6lpm nasal canula    General: Awake. Alert. Cooperative. No distress. Appears stated age 	  HEENT: Atraumatic. Normocephalic. Anicteric. Normal oral mucosa. PERRL. EOMI.  Neck: Supple. Trachea midline. Thyroid without enlargement/tenderness/nodules. No carotid bruit. No JVD.	  Cardiovascular: Tachycardic rate and rhythm. S1 S2 normal. II/VI systolic murmur.  Respiratory: Respirations unlabored. Bibasilar rales. Diffuse wheeze. Kyphosis.  Abdomen: Soft. Non-tender. Non-distended. No organomegaly. No masses. Normal bowel sounds. Obese.  Extremities: Warm to touch. No clubbing or cyanosis. No pedal edema.  Pulses: 2+ peripheral pulses all extremities.	  Skin: Normal skin color. No rashes or lesions. No ecchymoses. No cyanosis. Warm to touch.  Lymph Nodes: Cervical, supraclavicular and axillary nodes normal  Neurological: Motor and sensory examination equal and normal. A and O x 3  Psychiatry: Appropriate mood and affect.      LABS:                          13.4   9.02  )-----------( 190      ( 27 Dec 2021 16:51 )             41.5     CBC    WBC  9.02 <==    Hemoglobin  13.4 <<==    Hematocrit  41.5 <==    Platelets  190 <==      137  |  101  |  20  ----------------------------<  107<H>    12-27  3.8   |  22  |  0.88    LYTES    sodium  137 <==    potassium   3.8 <==    chloride  101 <==    carbon dioxide  22 <==    =============================================================================================  RENAL FUNCTION:    Creatinine:   0.88  <<==    BUN:   20 <==    ============================================================================================    calcium   9.0 <==    ============================================================================================  LFTs    AST:   48 <==     ALT:  25  <==     AP:  66  <=    Bili:  0.5  <=    PT/INR - ( 27 Dec 2021 16:51 )   PT: 13.0 sec;   INR: 1.09 ratio       PTT - ( 27 Dec 2021 16:51 )  PTT:26.3 sec    Venous Blood Gas:  12-27 @ 16:50  7.41/45/20/28/37.1  VBG Lactate: 2.2    Procalcitonin, Serum: 0.17 ng/mL (12-27 @ 16:51)    Serum Pro-Brain Natriuretic Peptide: 185 pg/mL (12-27 @ 19:30)    Serum Pro-Brain Natriuretic Peptide: 169 pg/mL (12-27 @ 16:51)      D-Dimer Assay, Quantitative: 1014 ng/mL DDU (12-27 @ 19:30)    D-Dimer Assay, Quantitative: 861 ng/mL DDU (12-27 @ 16:51)      C-Reactive Protein, Serum (12.27.21 @ 16:51)   C-Reactive Protein, Serum: 133 mg/L     Ferritin, Serum (12.27.21 @ 23:22)   Ferritin, Serum: 799 ng/mL     CARDIAC MARKERS ( 27 Dec 2021 16:51 )  CPK x     /CKMB x     /CKMB Units x        troponin 31 ng/L      MICROBIOLOGY:     Respiratory Viral Panel with COVID-19 by MONTY (12.27.21 @ 16:49)   Rapid RVP Result: Detected   SARS-CoV-2: Detected:      RADIOLOGY:  [x ] Chest radiographs reviewed and interpreted by me    CT chest 12/27 - "to my eye" reveals bilateral multifocal peripheral groundglass opacities c/w COVID related pneumonia    < from: CT Chest No Cont (12.27.21 @ 18:58) >    EXAM:  CT CHEST                          PROCEDURE DATE:  12/27/2021      INTERPRETATION:  CLINICAL INFORMATION: Hypoxia    FINDINGS:    Lungs/Airways/Pleura: There are ill defined peripheral and   peribronchovascular predominant ground glass densities throughout both   lungs. The airways are patent. No pleural effusion.    Mediastinum/Lymph nodes: No thoracic adenopathy. There is a moderate   hiatal hernia.    Heart and Vessels: The cardiac chambers are normal in size. There are   mild coronary artery calcifications. No pericardial effusion. The aorta   is normal in caliber.    Upper Abdomen: Unremarkable.    Osseous structures and Soft Tissues: There are old/healing bilateral rib   fractures. No aggressive bone lesions. There is an inferior right   shoulder dislocation. There are bilateral glenohumeral joint effusions.    IMPRESSION:    1.  Lung findings typically seen in COVID infection. Other infections are   also in the differential    2.  Moderate hiatal hernia    3.  Inferior right shoulder dislocation    AMANDA MCGUIRE M.D., Attending Radiologist  This document has been electronically signed. Dec 28 2021  9:54AM  --------------------------------------------------------------------------------------------------------------    EXAM:  XR CHEST PORTABLE URGENT 1V                          PROCEDURE DATE:  12/27/2021      INTERPRETATION:  CLINICAL INFORMATION: Shortness of breath, cough and   fever    TECHNIQUE: AP view(s) of the chest.    COMPARISON: No comparison available.    FINDINGS:  The cardiac silhouette is normal in size. Bilateral lower lung patchy   airspace opacities. No pleural effusion or pneumothorax. No acute bony   abnormality.    IMPRESSION:  Bilateral lower lung patchy airspace opacities.    NICK DENT DO; Resident Radiologist  This document has been electronically signed.  CANDIDO GARCIA MD; Attending Radiologist  This document has been electronically signed. Dec 28 2021 12:06AM    ---------------------------------------------------------------------------------------------------------------

## 2021-12-28 NOTE — CHART NOTE - NSCHARTNOTEFT_GEN_A_CORE
Called PT's daughter to clarify DNR status per discussion with palliative care educator   Confirmed DNR status with Daughter Daxa HCP  Dr Owens [facility]         Department of Medicine    [facility] [id number]

## 2021-12-28 NOTE — PROGRESS NOTE ADULT - ASSESSMENT
85 yo F with no reported PMHx, presents to the ED for generalized weakness for the past few days. Pt has also had subjective fevers and her daughter called 911. EMS found the pt to be hypoxic to low 80s on RA, placed on NRB. Here, pt improved to high 90s on 4L NC. She denies SOB, CP, dizziness, n/v/d, abd pain, leg swelling.   ptn is covid positive, has acute hypoxic respiratory failure, ID and Pulm called, chest ct ordered, DDimer is close to 900, will place on lovenox 40 mg bid, Symbicort and proventil HFA, remdesivir and dexamethasone iv. on cont O2NC 4L 87 yo F with no reported PMHx, presents to the ED for generalized weakness for the past few days. Pt has also had subjective fevers and her daughter called 911. EMS found the pt to be hypoxic to low 80s on RA, placed on NRB. Here, pt improved to high 90s on 4L NC. She denies SOB, CP, dizziness, n/v/d, abd pain, leg swelling.   ptn is covid positive, admitted with acute hypoxic respiratory failure,   ID and Pulm on board,   chest ct neg for PNA,   DDimer is close to 900,   cont lovenox 40 mg bid,   Symbicort and proventil HFA,   remdesivir and dexamethasone iv.   cont O2NC 4L

## 2021-12-28 NOTE — CONSULT NOTE ADULT - ASSESSMENT
ASSESSMENT:          CT chest "to my eye" reveals bilateral multifocal peripheral ground glass opacities c/w COVID related pneumonia   ASSESSMENT:    unvaccinated woman without significant past medical/surgical history admitted with COVID-19 infection complicated by acute hypoxic respiratory failure due to extensive pneumonia and bronchospasm - CT chest "to my eye" reveals bilateral multifocal peripheral ground glass opacities c/w COVID related pneumonia    PLAN/RECOMMENDATIONS:    oxygen supplementation to keep saturation greater than 92% - currently on 6lpm nasal canula  airborne and contact isolation  symbicort/spiriva  mucinex 1200mg 2 times daily  remdesivir x 5 days - follow renal and liver function  decadron 6mg IV/po x 10 days - following finger stick glucoses and treating hyperglycemia - should also treat bronchospasm  diligent DVT prophylaxis - SQ lovenox 40mg 2 times daily  following inflammatory markers - ferritin - d-dimer - CRP -> all significantly elevated  not a candidate for monoclonal antibodies on oxygen in the inpatient setting  consider an IL-6 inhibitor if Aa gradient worsens - high risk due to unvaccinated status and obesity    Thank you for the courtesy of this referral. Plan of care discussed with the patient at bedside and with Dr. Owens.    Smith Robb MD, [facility]  [phone]  Pulmonary Medicine     ASSESSMENT:    unvaccinated woman without significant past medical/surgical history admitted with COVID-19 infection complicated by acute hypoxic respiratory failure due to extensive pneumonia and bronchospasm - CT chest "to my eye" reveals bilateral multifocal peripheral ground glass opacities c/w COVID related pneumonia - no evidence of pulmonary edema or pleural effusions especially in the setting of a non-elevated pro-BNP level - no evidence of a superimposed bacterial infection n the absence of a leukocytosis or elevated procalcitonin level    PLAN/RECOMMENDATIONS:    oxygen supplementation to keep saturation greater than 92% - currently on 6lpm nasal canula  airborne and contact isolation  observe of antibacterial antibiotics and diuretics  symbicort/spiriva  mucinex 1200mg 2 times daily  remdesivir x 5 days - follow renal and liver function  decadron 6mg IV/po x 10 days - following finger stick glucoses and treating hyperglycemia - should also treat bronchospasm  diligent DVT prophylaxis - SQ lovenox 40mg 2 times daily  following inflammatory markers - ferritin - d-dimer - CRP -> all significantly elevated  not a candidate for monoclonal antibodies on oxygen in the inpatient setting  consider an IL-6 inhibitor if Aa gradient worsens - high risk due to unvaccinated status and obesity    Thank you for the courtesy of this referral. Plan of care discussed with the patient at bedside and with Dr. Owens.    Simth Robb MD, [facility]  [phone]  Pulmonary Medicine     ASSESSMENT:    unvaccinated woman without significant past medical/surgical history admitted with COVID-19 infection complicated by acute hypoxic respiratory failure due to extensive pneumonia and bronchospasm - CT chest "to my eye" reveals bilateral multifocal peripheral ground glass opacities c/w COVID related pneumonia - no evidence of pulmonary edema or pleural effusions especially in the setting of a non-elevated pro-BNP level - no evidence of a superimposed bacterial infection n the absence of a leukocytosis or elevated procalcitonin level    PLAN/RECOMMENDATIONS:    oxygen supplementation to keep saturation greater than 92% - currently on 6lpm nasal canula  airborne and contact isolation  observe of antibacterial antibiotics and diuretics  symbicort/spiriva  mucinex 1200mg 2 times daily  remdesivir x 5 days - follow renal and liver function  decadron 6mg IV/po x 10 days - following finger stick glucoses and treating hyperglycemia - should also treat bronchospasm  diligent DVT prophylaxis - SQ lovenox 40mg 2 times daily  following inflammatory markers - ferritin - d-dimer - CRP -> all significantly elevated  not a candidate for monoclonal antibodies on oxygen in the inpatient setting  consider an IL-6 inhibitor if Aa gradient worsens - high risk due to unvaccinated status and obesity  consider orthopedic evaluation for right shoulder dislocation    Thank you for the courtesy of this referral. Plan of care discussed with the patient at bedside and with Dr. Owens.    Smith Robb MD, [facility]  [phone]  Pulmonary Medicine

## 2021-12-28 NOTE — DISCHARGE NOTE PROVIDER - CARE PROVIDER_API CALL
Kirsten Pinto)  [address], [place] [address]  Phone: [phone]  Fax: [phone]  Follow Up Time:    Kirsten Pinto)  Medicine  [address] [place] [address], [address]  [place] [address]  Phone: [phone]  Fax: [phone]  Follow Up Time:     Elliot Caballero  CARDIOVASCULAR DISEASE  [address] [facility], [address]  [place], [place] [id number]  Phone: [phone]  Fax: [phone]  Follow Up Time: 1 week

## 2021-12-28 NOTE — DISCHARGE NOTE PROVIDER - NSDCFUADDAPPT_GEN_ALL_CORE_FT
Podiatry Discharge Instructions:  Follow up: Please follow up with Dr. Tinsley within 1 week of discharge from the hospital, please call [phone] for appointment and discuss that you recently were seen in the hospital.  Weight bearing: Please weight bear as tolerated in a surgical shoe.  Antibiotics: Please continue as instructed.

## 2021-12-28 NOTE — DISCHARGE NOTE PROVIDER - HOSPITAL COURSE
87 yo F with no reported PMHx, presents to the ED for generalized weakness for the past few days. Pt has also had subjective fevers and her daughter called 911. EMS found the pt to be hypoxic to low 80s on RA, placed on NRB. Here, pt improved to high 90s on 4L NC. She denies SOB, CP, dizziness, n/v/d, abd pain, leg swelling.   Pt has not seen a doctor in over 6 years but pt has followed cardiology in the past,    covid positive, admitted with acute hypoxic respiratory failure,   ID and Pulm f/u oted  chest ct neg for PNA on admission, and CTA chest neg for PE 12/30  pulm f/u noted  taper o2 as able  Symbicort and proventil HFA,   completed remdesivir, /steroids  neg dopplers for DVT,   Lovenox 40 bid,   denies having pain in right shoulder, has chronic AC dislocation, mult healed rib fractures  this was d/w in detail  ptn's daughter kp by dr garrett  ptn is DNR   87 yo F with no reported PMHx, presents to the ED for generalized weakness for the past few days. Pt has also had subjective fevers and her daughter called 911. EMS found the pt to be hypoxic to low 80s on RA, placed on NRB. Here, pt improved to high 90s on 4L NC. She denies SOB, CP, dizziness, n/v/d, abd pain, leg swelling.   Pt has not seen a doctor in over 6 years but pt has followed cardiology in the past,    covid positive, admitted with acute hypoxic respiratory failure,   ID and Pulm f/u oted  chest ct neg for PNA on admission, and CTA chest neg for PE 12/30  pulm f/u noted  taper o2 as able  Symbicort and proventil HFA,   completed remdesivir, /steroids  neg dopplers for DVT,   Lovenox 40 bid transition xarelto for elevated d-dimer  denies having pain in right shoulder, has chronic AC dislocation, mult healed rib fractures  this was d/w in detail  ptn's daughter kp by dr garrett  ptn is DNR

## 2021-12-28 NOTE — PHYSICAL THERAPY INITIAL EVALUATION ADULT - ADDITIONAL COMMENTS
As per patient, she has aides at home, unsure of reliability. As per CM note, daughter states patient has a rolling walker and stair lift. Unable to ambulate just prior to admit.

## 2021-12-28 NOTE — PHYSICAL THERAPY INITIAL EVALUATION ADULT - PERTINENT HX OF CURRENT PROBLEM, REHAB EVAL
Pt is an 87 y/o female with no reported PMH who p/w generalized weakness for the past few days. Pt has also had subjective fevers and her daughter called 911. EMS found the pt to be hypoxic to low 80s on RA, placed on NRB. Here, pt improved to high 90s on 4L NC. Admitted for acute hypoxic respiratory failure.

## 2021-12-28 NOTE — PHYSICAL THERAPY INITIAL EVALUATION ADULT - PRECAUTIONS/LIMITATIONS, REHAB EVAL
CT chest 12/27: 1.  Lung findings typically seen in COVID infection. Other infections are also in the differential. 2. Moderate hiatal hernia 3. Inferior right shoulder dislocation. Xray chest 12/27: Bilateral lower lung patchy airspace opacities.

## 2021-12-28 NOTE — PROGRESS NOTE ADULT - SUBJECTIVE AND OBJECTIVE BOX
Patient is a 86y old  Female who presents with a chief complaint of     SUBJECTIVE / OVERNIGHT EVENTS:    MEDICATIONS  (STANDING):  budesonide 160 MICROgram(s)/formoterol 4.5 MICROgram(s) Inhaler 2 Puff(s) Inhalation two times a day  dexAMETHasone  Injectable 6 milliGRAM(s) IV Push daily  enoxaparin Injectable 40 milliGRAM(s) SubCutaneous every 12 hours  guaiFENesin ER 1200 milliGRAM(s) Oral every 12 hours  influenza  Vaccine (HIGH DOSE) 0.7 milliLiter(s) IntraMuscular once  remdesivir  IVPB 100 milliGRAM(s) IV Intermittent every 24 hours  remdesivir  IVPB   IV Intermittent   tiotropium 18 MICROgram(s) Capsule 1 Capsule(s) Inhalation daily    MEDICATIONS  (PRN):      Vital Signs Last 24 Hrs  T(F): 97.9 (12-28-21 @ 15:30), Max: 99 (12-27-21 @ 23:44)  HR: 82 (12-28-21 @ 15:30) (80 - 96)  BP: 128/80 (12-28-21 @ 15:30) (122/68 - 144/76)  RR: 18 (12-28-21 @ 15:30) (15 - 34)  SpO2: 93% (12-28-21 @ 15:30) (93% - 99%)  Telemetry:   CAPILLARY BLOOD GLUCOSE        I&O's Summary    27 Dec 2021 07:01  -  28 Dec 2021 07:00  --------------------------------------------------------  IN: 550 mL / OUT: 650 mL / NET: -100 mL    28 Dec 2021 07:01  -  28 Dec 2021 18:04  --------------------------------------------------------  IN: 550 mL / OUT: 0 mL / NET: 550 mL        PHYSICAL EXAM:  GENERAL: NAD, well-developed  HEAD:  Atraumatic, Normocephalic  EYES: EOMI, PERRLA, conjunctiva and sclera clear  NECK: Supple, No JVD  CHEST/LUNG: Clear to auscultation bilaterally; No wheeze  HEART: Regular rate and rhythm; No murmurs, rubs, or gallops  ABDOMEN: Soft, Nontender, Nondistended; Bowel sounds present  EXTREMITIES:  2+ Peripheral Pulses, No clubbing, cyanosis, or edema  PSYCH: AAOx3  NEUROLOGY: non-focal  SKIN: No rashes or lesions    LABS:                        12.4   7.52  )-----------( 199      ( 28 Dec 2021 07:39 )             39.8     12-28    139  |  103  |  22  ----------------------------<  219<H>  3.9   |  23  |  0.91    Ca    8.6      28 Dec 2021 07:16    TPro  6.5  /  Alb  3.0<L>  /  TBili  0.3  /  DBili  0.1  /  AST  31  /  ALT  21  /  AlkPhos  59  12-28    PT/INR - ( 27 Dec 2021 16:51 )   PT: 13.0 sec;   INR: 1.09 ratio         PTT - ( 27 Dec 2021 16:51 )  PTT:26.3 sec          RADIOLOGY & ADDITIONAL TESTS:    Imaging Personally Reviewed:    Consultant(s) Notes Reviewed:      Care Discussed with Consultants/Other Providers:   Patient is a 86y old  Female who presents with a chief complaint of     SUBJECTIVE / OVERNIGHT EVENTS: ptn is feeling better    MEDICATIONS  (STANDING):  budesonide 160 MICROgram(s)/formoterol 4.5 MICROgram(s) Inhaler 2 Puff(s) Inhalation two times a day  dexAMETHasone  Injectable 6 milliGRAM(s) IV Push daily  enoxaparin Injectable 40 milliGRAM(s) SubCutaneous every 12 hours  guaiFENesin ER 1200 milliGRAM(s) Oral every 12 hours  influenza  Vaccine (HIGH DOSE) 0.7 milliLiter(s) IntraMuscular once  remdesivir  IVPB 100 milliGRAM(s) IV Intermittent every 24 hours  remdesivir  IVPB   IV Intermittent   tiotropium 18 MICROgram(s) Capsule 1 Capsule(s) Inhalation daily    MEDICATIONS  (PRN):      Vital Signs Last 24 Hrs  T(F): 97.9 (12-28-21 @ 15:30), Max: 99 (12-27-21 @ 23:44)  HR: 82 (12-28-21 @ 15:30) (80 - 96)  BP: 128/80 (12-28-21 @ 15:30) (122/68 - 144/76)  RR: 18 (12-28-21 @ 15:30) (15 - 34)  SpO2: 93% (12-28-21 @ 15:30) (93% - 99%)  Telemetry:   CAPILLARY BLOOD GLUCOSE        I&O's Summary    27 Dec 2021 07:01  -  28 Dec 2021 07:00  --------------------------------------------------------  IN: 550 mL / OUT: 650 mL / NET: -100 mL    28 Dec 2021 07:01  -  28 Dec 2021 18:04  --------------------------------------------------------  IN: 550 mL / OUT: 0 mL / NET: 550 mL        PHYSICAL EXAM:  GENERAL: NAD, well-developed  HEAD:  Atraumatic, Normocephalic  EYES: EOMI, PERRLA, conjunctiva and sclera clear  NECK: Supple, No JVD  CHEST/LUNG: Clear to auscultation bilaterally; No wheeze  HEART: Regular rate and rhythm; No murmurs, rubs, or gallops  ABDOMEN: Soft, Nontender, Nondistended; Bowel sounds present  EXTREMITIES:  2+ Peripheral Pulses, No clubbing, cyanosis, or edema  PSYCH: AAOx3  NEUROLOGY: non-focal  SKIN: No rashes or lesions    LABS:                        12.4   7.52  )-----------( 199      ( 28 Dec 2021 07:39 )             39.8     12-28    139  |  103  |  22  ----------------------------<  219<H>  3.9   |  23  |  0.91    Ca    8.6      28 Dec 2021 07:16    TPro  6.5  /  Alb  3.0<L>  /  TBili  0.3  /  DBili  0.1  /  AST  31  /  ALT  21  /  AlkPhos  59  12-28    PT/INR - ( 27 Dec 2021 16:51 )   PT: 13.0 sec;   INR: 1.09 ratio         PTT - ( 27 Dec 2021 16:51 )  PTT:26.3 sec          RADIOLOGY & ADDITIONAL TESTS:    Imaging Personally Reviewed:    Consultant(s) Notes Reviewed:      Care Discussed with Consultants/Other Providers:

## 2021-12-28 NOTE — DISCHARGE NOTE PROVIDER - NSDCMRMEDTOKEN_GEN_ALL_CORE_FT
multivitamin:   Tylenol: prn   amLODIPine 5 mg oral tablet: 1 tab(s) orally once a day  multivitamin:   Xarelto 10 mg oral tablet: 1 tab(s) orally once a day

## 2021-12-28 NOTE — DISCHARGE NOTE PROVIDER - PROVIDER TOKENS
PROVIDER:[TOKEN:[[id number]]] PROVIDER:[TOKEN:[[id number]]],PROVIDER:[TOKEN:[[id number]],FOLLOWUP:[1 week]]

## 2021-12-28 NOTE — GOALS OF CARE CONVERSATION - ADVANCED CARE PLANNING - WHAT MATTERS MOST
She would like her mother to get stronger and return home. She loves her very much and believes she is strong and will get better.

## 2021-12-28 NOTE — CONSULT NOTE ADULT - CONSULT REASON
COVID-19 related pneumonia; acute hypoxic respiratory failure COVID-19 related pneumonia; acute hypoxic respiratory failure; bronchospasm

## 2021-12-28 NOTE — GOALS OF CARE CONVERSATION - ADVANCED CARE PLANNING - CONVERSATION DETAILS
Patient was unable to be reached in room.  Spoke with daughter Daxa.  She has HCP and DNR for her mother and will send documents if she is able to locate them.  She wishes her mother to be DNR and does not want intubation or bipap and does not want to order that currently.  educated on vaccines and will consult doctors for vaccination on discharge.  She would like her mother to get stronger and return home. Explained TLT and sent video on choice.  Lakia CARO was notified of DNR order.    Video seen: none  Code status: DNR  Code received: BJWT  Forms received: RADHA  [employer] exemptions: NONE    Tamara Harris RN  Palliative Care Educator  Cell:  [phone]  Email: [email or web address]

## 2021-12-28 NOTE — DISCHARGE NOTE PROVIDER - NSDCCPCAREPLAN_GEN_ALL_CORE_FT
PRINCIPAL DISCHARGE DIAGNOSIS  Diagnosis: COVID-19  Assessment and Plan of Treatment: You tested positive for COVID 19.  You no longer require hospitalization.  Please restrict activities outside of your home except for getting medical care.  Do not go to work, school, or public areas.  Avoid using public transportation, ride-sharing, or taxis.  Separate yourself from other people and animals in your home.  Call ahead before visiting your doctor.  Wear a facemask when you are around other people. Cover your cough and sneezes.  Clean your hands often.  Avoid sharing personal household items.  Clean all frequently touched surfaces daily.         PRINCIPAL DISCHARGE DIAGNOSIS  Diagnosis: COVID-19  Assessment and Plan of Treatment: please c/w xarelto as prescribed for prophylaxisis and elevated d-dimer  You tested positive for COVID 19.  You no longer require hospitalization.  Please restrict activities outside of your home except for getting medical care.  Do not go to work, school, or public areas.  Avoid using public transportation, ride-sharing, or taxis.  Separate yourself from other people and animals in your home.  Call ahead before visiting your doctor.  Wear a facemask when you are around other people. Cover your cough and sneezes.  Clean your hands often.  Avoid sharing personal household items.  Clean all frequently touched surfaces daily.        SECONDARY DISCHARGE DIAGNOSES  Diagnosis: Hypertension  Assessment and Plan of Treatment: Follow up with your medical doctor to establish long term blood pressure treatment goals.

## 2021-12-28 NOTE — PATIENT PROFILE ADULT - FALL HARM RISK - HARM RISK INTERVENTIONS

## 2021-12-29 LAB
ALBUMIN SERPL ELPH-MCNC: 3.1 G/DL — LOW (ref 3.3–5)
ALP SERPL-CCNC: 60 U/L — SIGNIFICANT CHANGE UP (ref 40–120)
ALT FLD-CCNC: 23 U/L — SIGNIFICANT CHANGE UP (ref 10–45)
ANION GAP SERPL CALC-SCNC: 13 MMOL/L — SIGNIFICANT CHANGE UP (ref 5–17)
AST SERPL-CCNC: 31 U/L — SIGNIFICANT CHANGE UP (ref 10–40)
BILIRUB DIRECT SERPL-MCNC: <0.1 MG/DL — SIGNIFICANT CHANGE UP (ref 0–0.3)
BILIRUB INDIRECT FLD-MCNC: >0.3 MG/DL — SIGNIFICANT CHANGE UP (ref 0.2–1)
BILIRUB SERPL-MCNC: 0.4 MG/DL — SIGNIFICANT CHANGE UP (ref 0.2–1.2)
BUN SERPL-MCNC: 29 MG/DL — HIGH (ref 7–23)
CALCIUM SERPL-MCNC: 8.7 MG/DL — SIGNIFICANT CHANGE UP (ref 8.4–10.5)
CHLORIDE SERPL-SCNC: 103 MMOL/L — SIGNIFICANT CHANGE UP (ref 96–108)
CO2 SERPL-SCNC: 25 MMOL/L — SIGNIFICANT CHANGE UP (ref 22–31)
CREAT SERPL-MCNC: 0.85 MG/DL — SIGNIFICANT CHANGE UP (ref 0.5–1.3)
CREAT SERPL-MCNC: 0.87 MG/DL — SIGNIFICANT CHANGE UP (ref 0.5–1.3)
D DIMER BLD IA.RAPID-MCNC: 4014 NG/ML DDU — HIGH
FERRITIN SERPL-MCNC: 717 NG/ML — HIGH (ref 15–150)
GLUCOSE SERPL-MCNC: 100 MG/DL — HIGH (ref 70–99)
HCT VFR BLD CALC: 43.7 % — SIGNIFICANT CHANGE UP (ref 34.5–45)
HGB BLD-MCNC: 13.8 G/DL — SIGNIFICANT CHANGE UP (ref 11.5–15.5)
LDH SERPL L TO P-CCNC: 546 U/L — HIGH (ref 50–242)
MCHC RBC-ENTMCNC: 28.7 PG — SIGNIFICANT CHANGE UP (ref 27–34)
MCHC RBC-ENTMCNC: 31.6 GM/DL — LOW (ref 32–36)
MCV RBC AUTO: 90.9 FL — SIGNIFICANT CHANGE UP (ref 80–100)
NRBC # BLD: 0 /100 WBCS — SIGNIFICANT CHANGE UP (ref 0–0)
PLATELET # BLD AUTO: 251 K/UL — SIGNIFICANT CHANGE UP (ref 150–400)
POTASSIUM SERPL-MCNC: 4.1 MMOL/L — SIGNIFICANT CHANGE UP (ref 3.5–5.3)
POTASSIUM SERPL-SCNC: 4.1 MMOL/L — SIGNIFICANT CHANGE UP (ref 3.5–5.3)
PROT SERPL-MCNC: 6.5 G/DL — SIGNIFICANT CHANGE UP (ref 6–8.3)
RBC # BLD: 4.81 M/UL — SIGNIFICANT CHANGE UP (ref 3.8–5.2)
RBC # FLD: 14.3 % — SIGNIFICANT CHANGE UP (ref 10.3–14.5)
SODIUM SERPL-SCNC: 141 MMOL/L — SIGNIFICANT CHANGE UP (ref 135–145)
WBC # BLD: 11.63 K/UL — HIGH (ref 3.8–10.5)
WBC # FLD AUTO: 11.63 K/UL — HIGH (ref 3.8–10.5)

## 2021-12-29 PROCEDURE — 73030 X-RAY EXAM OF SHOULDER: CPT | Mod: 26,RT

## 2021-12-29 PROCEDURE — 93970 EXTREMITY STUDY: CPT | Mod: 26

## 2021-12-29 RX ADMIN — Medication 6 MILLIGRAM(S): at 05:16

## 2021-12-29 RX ADMIN — ENOXAPARIN SODIUM 40 MILLIGRAM(S): 100 INJECTION SUBCUTANEOUS at 17:43

## 2021-12-29 RX ADMIN — BUDESONIDE AND FORMOTEROL FUMARATE DIHYDRATE 2 PUFF(S): 160; 4.5 AEROSOL RESPIRATORY (INHALATION) at 17:44

## 2021-12-29 RX ADMIN — TIOTROPIUM BROMIDE 1 CAPSULE(S): 18 CAPSULE ORAL; RESPIRATORY (INHALATION) at 12:46

## 2021-12-29 RX ADMIN — Medication 1200 MILLIGRAM(S): at 05:16

## 2021-12-29 RX ADMIN — REMDESIVIR 500 MILLIGRAM(S): 5 INJECTION INTRAVENOUS at 17:44

## 2021-12-29 RX ADMIN — ENOXAPARIN SODIUM 40 MILLIGRAM(S): 100 INJECTION SUBCUTANEOUS at 05:17

## 2021-12-29 RX ADMIN — BUDESONIDE AND FORMOTEROL FUMARATE DIHYDRATE 2 PUFF(S): 160; 4.5 AEROSOL RESPIRATORY (INHALATION) at 05:17

## 2021-12-29 RX ADMIN — Medication 1200 MILLIGRAM(S): at 17:43

## 2021-12-29 NOTE — PROGRESS NOTE ADULT - SUBJECTIVE AND OBJECTIVE BOX
[facility] - PROGRESS NOTE    CHIEF COMPLAINT: COVID-19 related pneumonia; acute hypoxic respiratory failure; bronchospasm; obesity    INTERVAL HISTORY: CT scan -> ill defined peripheral and peribronchovascular predominant ground glass densities throughout both lungs c/w COVID pneumonia; no shortness of breath or hypoxemia on a nasal canula @ 2lpm; occasional cough productive of scant sputum; ongoing chest congestion and wheeze; no fevers, chills or sweats; no chest pain/pressure or palpitations; less fatigue, malaise and weakness; unhappy about frequent blood draws;     REVIEW OF SYSTEMS:  Constitutional: As per interval history  HEENT: Within normal limits  CV: As per interval history  Resp: As per interval history  GI: Within normal limits   : Within normal limits  Musculoskeletal: shoulder arthritis - dislocated right shoulder  Skin: Within normal limits  Neurological: Within normal limits  Psychiatric: Within normal limits  Endocrine: Within normal limits  Hematologic/Lymphatic: Within normal limits  Allergic/Immunologic: Within normal limits      MEDICATIONS:     Pulmonary "  budesonide 160 MICROgram(s)/formoterol 4.5 MICROgram(s) Inhaler 2 Puff(s) Inhalation two times a day  guaiFENesin ER 1200 milliGRAM(s) Oral every 12 hours  tiotropium 18 MICROgram(s) Capsule 1 Capsule(s) Inhalation daily    Anti-microbials:  remdesivir  IVPB 100 milliGRAM(s) IV Intermittent every 24 hours  remdesivir  IVPB   IV Intermittent     Cardiovascular:    Other:  dexAMETHasone  Injectable 6 milliGRAM(s) IV Push daily  enoxaparin Injectable 40 milliGRAM(s) SubCutaneous every 12 hours  influenza  Vaccine (HIGH DOSE) 0.7 milliLiter(s) IntraMuscular once    MEDICATIONS  (PRN):    OBJECTIVE:    I&O's Detail    28 Dec 2021 07:01  -  29 Dec 2021 07:00  --------------------------------------------------------  IN:    Oral Fluid: 550 mL  Total IN: 550 mL    OUT:    Voided (mL): 100 mL  Total OUT: 100 mL    Total NET: 450 mL    PHYSICAL EXAM:       ICU Vital Signs Last 24 Hrs  T(C): 36.9 (29 Dec 2021 00:18), Max: 36.9 (29 Dec 2021 00:18)  T(F): 98.4 (29 Dec 2021 00:18), Max: 98.4 (29 Dec 2021 00:18)  HR: 86 (29 Dec 2021 00:18) (82 - 86)  BP: 136/89 (29 Dec 2021 00:18) (128/80 - 136/89)  BP(mean): --  ABP: --  ABP(mean): --  RR: 18 (29 Dec 2021 00:18) (18 - 18)  SpO2: 93% (29 Dec 2021 00:18) (93% - 94%) on 4lpm nasal canula     General: Awake. Alert. Cooperative. No distress. Appears stated age. Obese	  HEENT: Atraumatic. Normocephalic. Anicteric. Normal oral mucosa. PERRL. EOMI.  Neck: Supple. Trachea midline. Thyroid without enlargement/tenderness/nodules. No carotid bruit. No JVD.	  Cardiovascular: Regular rate and rhythm. S1 S2 normal. II/VI systolic murmur.  Respiratory: Respirations unlabored. Bibasilar rales. Diffuse wheeze. Kyphosis.  Abdomen: Soft. Non-tender. Non-distended. No organomegaly. No masses. Normal bowel sounds. Obese.  Extremities: Warm to touch. No clubbing or cyanosis. No pedal edema.  Pulses: 2+ peripheral pulses all extremities.	  Skin: Normal skin color. No rashes or lesions. No ecchymoses. No cyanosis. Warm to touch.  Lymph Nodes: Cervical, supraclavicular and axillary nodes normal  Neurological: Motor and sensory examination equal and normal. A and O x 3  Psychiatry: Appropriate mood and affect.    LABS:                          12.4   7.52  )-----------( 199      ( 28 Dec 2021 07:39 )             39.8     CBC    WBC  7.52 <==, 9.02 <==    Hemoglobin  12.4 <<==, 13.4 <<==    Hematocrit  39.8 <==, 41.5 <==    Platelets  199 <==, 190 <==      139  |  103  |  22  ----------------------------<  219<H>    12-28  3.9   |  23  |  0.91      LYTES    sodium  139 <==, 137 <==    potassium   3.9 <==, 3.8 <==    chloride  103 <==, 101 <==    carbon dioxide  23 <==, 22 <==    =============================================================================================  RENAL FUNCTION:    Creatinine:   0.91  <<==, 0.88  <<==, 0.88  <<==    BUN:   22 <==, 20 <==    ============================================================================================    calcium   8.6 <==, 9.0 <==  ============================================================================================  LFTs    AST:   31 <== , 48 <==     ALT:  21  <== , 25  <==     AP:  59  <=, 66  <=    Bili:  0.3  <=, 0.5  <=    PT/INR - ( 27 Dec 2021 16:51 )   PT: 13.0 sec;   INR: 1.09 ratio       PTT - ( 27 Dec 2021 16:51 )  PTT: 26.3 sec    Venous Blood Gas:  12-27 @ 16:50  7.41/45/20/28/37.1  VBG Lactate: 2.2    Procalcitonin, Serum: 0.17 ng/mL (12-27 @ 16:51)    Serum Pro-Brain Natriuretic Peptide: 185 pg/mL (12-27 @ 19:30)  Serum Pro-Brain Natriuretic Peptide: 169 pg/mL (12-27 @ 16:51)    CARDIAC MARKERS ( 27 Dec 2021 16:51 )  CPK x     /CKMB x     /CKMB Units x        troponin 31 ng/L    D-Dimer Assay, Quantitative: 1014 ng/mL DDU (12-27 @ 19:30)    D-Dimer Assay, Quantitative: 861 ng/mL DDU (12-27 @ 16:51)      C-Reactive Protein, Serum (12.27.21 @ 16:51)   C-Reactive Protein, Serum: 133 mg/L     Ferritin, Serum (12.27.21 @ 23:22)   Ferritin, Serum: 799 ng/mL     CARDIAC MARKERS ( 27 Dec 2021 16:51 )  CPK x     /CKMB x     /CKMB Units x        troponin 31 ng/L      MICROBIOLOGY:     Respiratory Viral Panel with COVID-19 by MONTY (12.27.21 @ 16:49)   Rapid RVP Result: Detected   SARS-CoV-2: Detected:      RADIOLOGY:  [x ] Chest radiographs reviewed and interpreted by me    CT chest 12/27 - "to my eye" reveals bilateral multifocal peripheral groundglass opacities c/w COVID related pneumonia    < from: CT Chest No Cont (12.27.21 @ 18:58) >    EXAM:  CT CHEST                          PROCEDURE DATE:  12/27/2021      INTERPRETATION:  CLINICAL INFORMATION: Hypoxia    FINDINGS:    Lungs/Airways/Pleura: There are ill defined peripheral and   peribronchovascular predominant ground glass densities throughout both   lungs. The airways are patent. No pleural effusion.    Mediastinum/Lymph nodes: No thoracic adenopathy. There is a moderate   hiatal hernia.    Heart and Vessels: The cardiac chambers are normal in size. There are   mild coronary artery calcifications. No pericardial effusion. The aorta   is normal in caliber.    Upper Abdomen: Unremarkable.    Osseous structures and Soft Tissues: There are old/healing bilateral rib   fractures. No aggressive bone lesions. There is an inferior right   shoulder dislocation. There are bilateral glenohumeral joint effusions.    IMPRESSION:    1.  Lung findings typically seen in COVID infection. Other infections are   also in the differential    2.  Moderate hiatal hernia    3.  Inferior right shoulder dislocation    AMANDA MCGUIRE M.D., Attending Radiologist  This document has been electronically signed. Dec 28 2021  9:54AM  --------------------------------------------------------------------------------------------------------------  EXAM:  XR CHEST PORTABLE URGENT 1V                          PROCEDURE DATE:  12/27/2021      INTERPRETATION:  CLINICAL INFORMATION: Shortness of breath, cough and   fever    TECHNIQUE: AP view(s) of the chest.    COMPARISON: No comparison available.    FINDINGS:  The cardiac silhouette is normal in size. Bilateral lower lung patchy   airspace opacities. No pleural effusion or pneumothorax. No acute bony   abnormality.    IMPRESSION:  Bilateral lower lung patchy airspace opacities.    NICK DENT DO; Resident Radiologist  This document has been electronically signed.  CANDIDO GARCIA MD; Attending Radiologist  This document has been electronically signed. Dec 28 2021 12:06AM    ---------------------------------------------------------------------------------------------------------------

## 2021-12-29 NOTE — PROGRESS NOTE ADULT - ASSESSMENT
87 yo F with no reported PMHx, presents to the ED for generalized weakness for the past few days. Pt has also had subjective fevers and her daughter called 911. EMS found the pt to be hypoxic to low 80s on RA, placed on NRB. Here, pt improved to high 90s on 4L NC. She denies SOB, CP, dizziness, n/v/d, abd pain, leg swelling.   ptn is covid positive, admitted with acute hypoxic respiratory failure,   ID and Pulm on board,   chest ct neg for PNA,   DDimer is close to 900,   cont lovenox 40 mg bid,   Symbicort and proventil HFA,   remdesivir and dexamethasone iv.   cont O2NC 4L

## 2021-12-29 NOTE — PROGRESS NOTE ADULT - ASSESSMENT
ASSESSMENT:    unvaccinated woman without significant past medical/surgical history admitted with COVID-19 infection complicated by acute hypoxic respiratory failure due to extensive pneumonia and bronchospasm - CT chest "to my eye" reveals bilateral multifocal peripheral ground glass opacities c/w COVID related pneumonia - no evidence of pulmonary edema or pleural effusions especially in the setting of a non-elevated pro-BNP level - no evidence of a superimposed bacterial infection in the absence of a leukocytosis or elevated procalcitonin level    PLAN/RECOMMENDATIONS:    oxygen supplementation to keep saturation greater than 92% - taper to 4lpm nasal canula  airborne and contact isolation  observe of antibacterial antibiotics and diuretics  symbicort/spiriva  mucinex 1200mg 2 times daily  remdesivir x 5 days - follow renal and liver function  decadron 6mg IV/po x 10 days - following finger stick glucoses and treating hyperglycemia - should also treat bronchospasm  diligent DVT prophylaxis - SQ lovenox 40mg 2 times daily  following inflammatory markers - ferritin - d-dimer - CRP -> all significantly elevated  not a candidate for monoclonal antibodies on oxygen in the inpatient setting  consider an IL-6 inhibitor if Aa gradient worsens - high risk due to unvaccinated status and obesity  consider orthopedic evaluation for right shoulder dislocation  should receive the COVID vaccination 30 days from discharge    Will follow with you. Plan of care discussed with the patient at bedside and the dedicated floor NP.    Smith Robb MD, [facility]  [phone]  Pulmonary Medicine

## 2021-12-29 NOTE — PROGRESS NOTE ADULT - SUBJECTIVE AND OBJECTIVE BOX
Patient is a 86y old  Female who presents with a chief complaint of     SUBJECTIVE / OVERNIGHT EVENTS: on 4LO2NC    MEDICATIONS  (STANDING):  budesonide 160 MICROgram(s)/formoterol 4.5 MICROgram(s) Inhaler 2 Puff(s) Inhalation two times a day  dexAMETHasone  Injectable 6 milliGRAM(s) IV Push daily  enoxaparin Injectable 40 milliGRAM(s) SubCutaneous every 12 hours  guaiFENesin ER 1200 milliGRAM(s) Oral every 12 hours  influenza  Vaccine (HIGH DOSE) 0.7 milliLiter(s) IntraMuscular once  remdesivir  IVPB 100 milliGRAM(s) IV Intermittent every 24 hours  remdesivir  IVPB   IV Intermittent   tiotropium 18 MICROgram(s) Capsule 1 Capsule(s) Inhalation daily    MEDICATIONS  (PRN):      Vital Signs Last 24 Hrs  T(F): 98.1 (12-29-21 @ 16:04), Max: 98.7 (12-29-21 @ 09:29)  HR: 88 (12-29-21 @ 16:04) (86 - 88)  BP: 120/84 (12-29-21 @ 16:04) (120/84 - 146/84)  RR: 18 (12-29-21 @ 16:04) (18 - 18)  SpO2: 97% (12-29-21 @ 16:04) (90% - 97%)  Telemetry:   CAPILLARY BLOOD GLUCOSE        I&O's Summary    28 Dec 2021 07:01  -  29 Dec 2021 07:00  --------------------------------------------------------  IN: 550 mL / OUT: 100 mL / NET: 450 mL    29 Dec 2021 07:01  -  29 Dec 2021 18:23  --------------------------------------------------------  IN: 240 mL / OUT: 300 mL / NET: -60 mL        PHYSICAL EXAM:  GENERAL: NAD, well-developed  HEAD:  Atraumatic, Normocephalic  EYES: EOMI, PERRLA, conjunctiva and sclera clear  NECK: Supple, No JVD  CHEST/LUNG: Clear to auscultation bilaterally; No wheeze  HEART: Regular rate and rhythm; No murmurs, rubs, or gallops  ABDOMEN: Soft, Nontender, Nondistended; Bowel sounds present  EXTREMITIES:  2+ Peripheral Pulses, No clubbing, cyanosis, or edema  PSYCH: AAOx3  NEUROLOGY: non-focal  SKIN: No rashes or lesions    LABS:                        13.8   11.63 )-----------( 251      ( 29 Dec 2021 09:27 )             43.7     12-29    141  |  103  |  29<H>  ----------------------------<  100<H>  4.1   |  25  |  0.87    Ca    8.7      29 Dec 2021 09:27    TPro  6.5  /  Alb  3.1<L>  /  TBili  0.4  /  DBili  <0.1  /  AST  31  /  ALT  23  /  AlkPhos  60  12-29              RADIOLOGY & ADDITIONAL TESTS:    Imaging Personally Reviewed:    Consultant(s) Notes Reviewed:      Care Discussed with Consultants/Other Providers:

## 2021-12-30 PROCEDURE — 71045 X-RAY EXAM CHEST 1 VIEW: CPT | Mod: 26

## 2021-12-30 PROCEDURE — 71275 CT ANGIOGRAPHY CHEST: CPT | Mod: 26

## 2021-12-30 RX ADMIN — Medication 6 MILLIGRAM(S): at 05:48

## 2021-12-30 RX ADMIN — BUDESONIDE AND FORMOTEROL FUMARATE DIHYDRATE 2 PUFF(S): 160; 4.5 AEROSOL RESPIRATORY (INHALATION) at 17:24

## 2021-12-30 RX ADMIN — BUDESONIDE AND FORMOTEROL FUMARATE DIHYDRATE 2 PUFF(S): 160; 4.5 AEROSOL RESPIRATORY (INHALATION) at 06:06

## 2021-12-30 RX ADMIN — ENOXAPARIN SODIUM 40 MILLIGRAM(S): 100 INJECTION SUBCUTANEOUS at 17:28

## 2021-12-30 RX ADMIN — TIOTROPIUM BROMIDE 1 CAPSULE(S): 18 CAPSULE ORAL; RESPIRATORY (INHALATION) at 11:30

## 2021-12-30 RX ADMIN — ENOXAPARIN SODIUM 40 MILLIGRAM(S): 100 INJECTION SUBCUTANEOUS at 06:27

## 2021-12-30 RX ADMIN — Medication 1200 MILLIGRAM(S): at 05:44

## 2021-12-30 RX ADMIN — Medication 1200 MILLIGRAM(S): at 17:23

## 2021-12-30 RX ADMIN — REMDESIVIR 500 MILLIGRAM(S): 5 INJECTION INTRAVENOUS at 17:27

## 2021-12-30 NOTE — PROGRESS NOTE ADULT - ASSESSMENT
85 yo F with no reported PMHx, presents to the ED for generalized weakness for the past few days. Pt has also had subjective fevers and her daughter called 911. EMS found the pt to be hypoxic to low 80s on RA, placed on NRB. Here, pt improved to high 90s on 4L NC. She denies SOB, CP, dizziness, n/v/d, abd pain, leg swelling.   ptn is covid positive, admitted with acute hypoxic respiratory failure,   ID and Pulm on board,   chest ct neg for PNA on admission,   DDimer is close to 900 on admission, now rising,   Symbicort and proventil HFA,   remdesivir and dexamethasone iv.   worsening oxygenation, requiring on and off 100% NRB, doesnt tolerate HF, worsening cxr, rising inflammatory markers, neg dopplers for DVT, awaiting CTA chest. on Lovenox 40 bid, will need Tocilizumab,  check procalcitonin.   this was d/w in detail w niece lizeth pelletier and left a VM with ptn's daughter x 45 min. ptn is DNR

## 2021-12-30 NOTE — PROGRESS NOTE ADULT - SUBJECTIVE AND OBJECTIVE BOX
[facility] - PROGRESS NOTE    CHIEF COMPLAINT: COVID-19 related pneumonia; acute hypoxic respiratory failure; bronchospasm; obesity; lethargy    INTERVAL HISTORY: CT scan -> ill defined peripheral and peribronchovascular predominant ground glass densities throughout both lungs c/w COVID pneumonia; seems more lethargic and only slowly answers question; increasing oxygen requirements now on a nasal canula @ 8lpm; occasional cough productive of scant sputum; resolved chest congestion and wheeze; no fevers, chills or sweats; no chest pain/pressure or palpitations; less fatigue, malaise and weakness;    REVIEW OF SYSTEMS:  Constitutional: As per interval history  HEENT: Within normal limits  CV: As per interval history  Resp: As per interval history  GI: Within normal limits   : Within normal limits  Musculoskeletal: shoulder arthritis - dislocated right shoulder  Skin: Within normal limits  Neurological: lethargic  Psychiatric: Within normal limits  Endocrine: Within normal limits  Hematologic/Lymphatic: Within normal limits  Allergic/Immunologic: Within normal limits    MEDICATIONS:     Pulmonary "  budesonide 160 MICROgram(s)/formoterol 4.5 MICROgram(s) Inhaler 2 Puff(s) Inhalation two times a day  guaiFENesin ER 1200 milliGRAM(s) Oral every 12 hours  tiotropium 18 MICROgram(s) Capsule 1 Capsule(s) Inhalation daily    Anti-microbials:  remdesivir  IVPB 100 milliGRAM(s) IV Intermittent every 24 hours  remdesivir  IVPB   IV Intermittent     Cardiovascular:    Other:  dexAMETHasone  Injectable 6 milliGRAM(s) IV Push daily  enoxaparin Injectable 40 milliGRAM(s) SubCutaneous every 12 hours  influenza  Vaccine (HIGH DOSE) 0.7 milliLiter(s) IntraMuscular once    MEDICATIONS  (PRN):    OBJECTIVE:    I&O's Detail    29 Dec 2021 07:01  -  30 Dec 2021 07:00  --------------------------------------------------------  IN:    Oral Fluid: 240 mL  Total IN: 240 mL    OUT:    Voided (mL): 1000 mL  Total OUT: 1000 mL    Total NET: -760 mL    PHYSICAL EXAM:       ICU Vital Signs Last 24 Hrs  T(C): 36.9 (30 Dec 2021 00:04), Max: 37.1 (29 Dec 2021 09:29)  T(F): 98.4 (30 Dec 2021 00:04), Max: 98.7 (29 Dec 2021 09:29)  HR: 86 (30 Dec 2021 00:04) (86 - 88)  BP: 154/64 (30 Dec 2021 00:04) (120/84 - 154/64)  BP(mean): --  ABP: --  ABP(mean): --  RR: 16 (30 Dec 2021 06:15) (16 - 22)  SpO2: 97% (30 Dec 2021 06:15) (88% on 4lpm - 92% on 8lpm nasal canula)     General: Lethargic. Answers questions slowly. No distress. Appears stated age. Obese	  HEENT: Atraumatic. Normocephalic. Anicteric. Normal oral mucosa. PERRL. EOMI.  Neck: Supple. Trachea midline. Thyroid without enlargement/tenderness/nodules. No carotid bruit. No JVD.	  Cardiovascular: Regular rate and rhythm. S1 S2 normal. II/VI systolic murmur.  Respiratory: Respirations unlabored. Bibasilar rales. Decreased wheeze. Kyphosis.  Abdomen: Soft. Non-tender. Non-distended. No organomegaly. No masses. Normal bowel sounds. Obese.  Extremities: Warm to touch. No clubbing or cyanosis. No pedal edema.  Pulses: 2+ peripheral pulses all extremities.	  Skin: Normal skin color. No rashes or lesions. No ecchymoses. No cyanosis. Warm to touch.  Lymph Nodes: Cervical, supraclavicular and axillary nodes normal  Neurological: Motor and sensory examination equal and normal. A and O x 3  Psychiatry: Appropriate mood and affect.    LABS:                          13.8   11.63 )-----------( 251      ( 29 Dec 2021 09:27 )             43.7     CBC    WBC  11.63 <==, 7.52 <==, 9.02 <==    Hemoglobin  13.8 <<==, 12.4 <<==, 13.4 <<==    Hematocrit  43.7 <==, 39.8 <==, 41.5 <==    Platelets  251 <==, 199 <==, 190 <==      141  |  103  |  29<H>  ----------------------------<  100<H>    12-29  4.1   |  25  |  0.87      LYTES    sodium  141 <==, 139 <==, 137 <==    potassium   4.1 <==, 3.9 <==, 3.8 <==    chloride  103 <==, 103 <==, 101 <==    carbon dioxide  25 <==, 23 <==, 22 <==    =============================================================================================  RENAL FUNCTION:    Creatinine:   0.87  <<==, 0.85  <<==, 0.91  <<==, 0.88  <<==, 0.88  <<==    BUN:   29 <==, 22 <==, 20 <==    ============================================================================================    calcium   8.7 <==, 8.6 <==, 9.0 <==  ============================================================================================  LFTs    AST:   31 <== , 31 <== , 48 <==     ALT:  23  <== , 21  <== , 25  <==     AP:  60  <=, 59  <=, 66  <=    Bili:  0.4  <=, 0.3  <=, 0.5  <=    PT/INR - ( 27 Dec 2021 16:51 )   PT: 13.0 sec;   INR: 1.09 ratio      PTT - ( 27 Dec 2021 16:51 )  PTT: 26.3 sec    Venous Blood Gas:  12-27 @ 16:50  7.41/45/20/28/37.1  VBG Lactate: 2.2    Procalcitonin, Serum: 0.17 ng/mL (12-27 @ 16:51)    Serum Pro-Brain Natriuretic Peptide: 185 pg/mL (12-27 @ 19:30)  Serum Pro-Brain Natriuretic Peptide: 169 pg/mL (12-27 @ 16:51)    CARDIAC MARKERS ( 27 Dec 2021 16:51 )  CPK x     /CKMB x     /CKMB Units x        troponin 31 ng/L    D-Dimer Assay, Quantitative (12.29.21 @ 09:26)   D-Dimer Assay, Quantitative: 4014  D-Dimer Assay, Quantitative (12.27.21 @ 19:30)   D-Dimer Assay, Quantitative: 1014  D-Dimer Assay, Quantitative (12.27.21 @ 16:51)   D-Dimer Assay, Quantitative: 861    C-Reactive Protein, Serum (12.27.21 @ 16:51)   C-Reactive Protein, Serum: 133 mg/L     Ferritin, Serum in AM (12.29.21 @ 11:37)   Ferritin, Serum: 717 ng/mL   Ferritin, Serum (12.27.21 @ 23:22)   Ferritin, Serum: 799 ng/mL     CARDIAC MARKERS ( 27 Dec 2021 16:51 )  CPK x     /CKMB x     /CKMB Units x        troponin 31 ng/L      MICROBIOLOGY:     Respiratory Viral Panel with COVID-19 by MONTY (12.27.21 @ 16:49)   Rapid RVP Result: Detected   SARS-CoV-2: Detected:      RADIOLOGY:  [x ] Chest radiographs reviewed and interpreted by me    < from: CT Chest No Cont (12.27.21 @ 18:58) >    EXAM:  CT CHEST                          PROCEDURE DATE:  12/27/2021      INTERPRETATION:  CLINICAL INFORMATION: Hypoxia    FINDINGS:    Lungs/Airways/Pleura: There are ill defined peripheral and   peribronchovascular predominant ground glass densities throughout both   lungs. The airways are patent. No pleural effusion.    Mediastinum/Lymph nodes: No thoracic adenopathy. There is a moderate   hiatal hernia.    Heart and Vessels: The cardiac chambers are normal in size. There are   mild coronary artery calcifications. No pericardial effusion. The aorta   is normal in caliber.    Upper Abdomen: Unremarkable.    Osseous structures and Soft Tissues: There are old/healing bilateral rib   fractures. No aggressive bone lesions. There is an inferior right   shoulder dislocation. There are bilateral glenohumeral joint effusions.    IMPRESSION:    1.  Lung findings typically seen in COVID infection. Other infections are   also in the differential    2.  Moderate hiatal hernia    3.  Inferior right shoulder dislocation    AMANDA MCGUIRE M.D., Attending Radiologist  This document has been electronically signed. Dec 28 2021  9:54AM  --------------------------------------------------------------------------------------------------------------  EXAM:  XR CHEST PORTABLE URGENT 1V                          PROCEDURE DATE:  12/27/2021      INTERPRETATION:  CLINICAL INFORMATION: Shortness of breath, cough and   fever    TECHNIQUE: AP view(s) of the chest.    COMPARISON: No comparison available.    FINDINGS:  The cardiac silhouette is normal in size. Bilateral lower lung patchy   airspace opacities. No pleural effusion or pneumothorax. No acute bony   abnormality.    IMPRESSION:  Bilateral lower lung patchy airspace opacities.    NICK DENT DO; Resident Radiologist  This document has been electronically signed.  CANDIDO GARCIA MD; Attending Radiologist  This document has been electronically signed. Dec 28 2021 12:06AM    ---------------------------------------------------------------------------------------------------------------  < from: Xray Shoulder 2 Views, Right (12.29.21 @ 17:30) >    EXAM:  XR SHOULDER COMP MIN 2V RT                          PROCEDURE DATE:  12/29/2021      INTERPRETATION:  CLINICAL INDICATION: radiographic correlation of   dislocated right shoulder detected on recent CT    EXAM:  AP and transscapular Y right shoulder from 12/29/2021 at 1730. Reviewed   in conjunction with chest CT from 12/27/2021.    IMPRESSION:  Redemonstrated anteriorly dislocated right shoulder, likely chronic. AC   joint alignment maintained.    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 Degenerative spurring apparent along   the uncovered glenoid and humeral head articular margins.    Multiple old upper right rib fracture deformities also apparent.    No acute appearing fractures.    Generalized osteopenia otherwise no discrete lytic or blastic lesions.    SANTHOSH NGUYEN MD; Attending Radiologist  This document has been electronically signed. Dec 29 2021  5:37PM    ---------------------------------------------------------------------------------------------------------------

## 2021-12-30 NOTE — CONSULT NOTE ADULT - ASSESSMENT
85 yo F with no reported PMHx, presents to the ED for generalized weakness for the past few days. Pt has also had subjective fevers and her daughter called 911. EMS found the pt to be hypoxic to low 80s on RA, placed on NRB. Here, pt improved to high 90s on 4L NC. She denies SOB, CP, dizziness, n/v/d, abd pain, leg swelling.   Pt has not seen a doctor in over 6 years but pt has followed cardiology in the past, Dr. Elliot Caballero.   Daughter Daxa [phone] (27 Dec 2021 18:25)    ER vitals:  98.8, P 108, /94.  On supplemental O2, requiring nasal cannula 6 to 8 L.      Covid (+):    - Pt requiring supplemental O2.  CT chest with b/l groundglass densities c/w covid pna.  Agree with remdesivir and dexamethasone.  Monitor daily LFTs and renal function while pt on remdesivir.    - Maintain oxygenation >90%.  Pt requiring 6-8L on nasal cannula    - Check inflammatory markers:  DD: 4014 <-- 1014 <-- 861  Ferritin: 717 <-- 799  Crp: 133  Pct: 0.17    - Pt with rising D-dimer, LE dopplers neg for DVT.  Pt pending CTA chest to r/o PE.  Cont lovenox BID dosing.    - Monitor off abx, do not suspect superimposed bacterial infection.    - Will cont to re-evaluate pt for tocilizumab - if pt were to require HFNC and CTA chest negative for PE (no alternate causes to explain hypoxia), will recommend tocilizumab infusion.  Pt will require consent and provision of EUA fact sheet prior to infusion.      Will follow,    Asuncion Nice  [phone] 85 yo F with no reported PMHx, presents to the ED for generalized weakness for the past few days. Pt has also had subjective fevers and her daughter called 911. EMS found the pt to be hypoxic to low 80s on RA, placed on NRB. Here, pt improved to high 90s on 4L NC. She denies SOB, CP, dizziness, n/v/d, abd pain, leg swelling.   Pt has not seen a doctor in over 6 years but pt has followed cardiology in the past, Dr. Elliot Caballero.   Daughter Daxa [phone] (27 Dec 2021 18:25)    ER vitals:  98.8, P 108, /94.  On supplemental O2, requiring nasal cannula 6 to 8 L.      Covid (+):    - Pt requiring supplemental O2.  CT chest with b/l groundglass densities c/w covid pna.  Agree with remdesivir and dexamethasone.  Monitor daily LFTs and renal function while pt on remdesivir.    - Maintain oxygenation >90%.  Pt requiring 6-8L on nasal cannula    - Check inflammatory markers:  DD: 4014 <-- 1014 <-- 861  Ferritin: 717 <-- 799  Crp: 133  Pct: 0.17    - Pt with rising D-dimer, LE dopplers neg for DVT.  Pt pending CTA chest to r/o PE.  Cont lovenox BID dosing.    - Monitor off abx, do not suspect superimposed bacterial infection.    - Will cont to re-evaluate pt for tocilizumab - if pt were to require HFNC and CTA chest negative for PE (no alternate causes to explain hypoxia), will recommend tocilizumab infusion.  Pt will require consent and provision of EUA fact sheet prior to infusion.        Dr. Abdulaziz Gordon covering 12/31 through 1/2.  [phone]

## 2021-12-30 NOTE — PROGRESS NOTE ADULT - ASSESSMENT
ASSESSMENT:    unvaccinated woman without significant past medical/surgical history admitted with COVID-19 infection complicated by acute hypoxic respiratory failure due to extensive pneumonia and bronchospasm - CT chest "to my eye" reveals bilateral multifocal peripheral ground glass opacities c/w COVID related pneumonia - no evidence of pulmonary edema or pleural effusions especially in the setting of a non-elevated pro-BNP level - no evidence of a superimposed bacterial infection in the absence of an elevated procalcitonin level - suspect the new leukocytosis is due to high dose steroids rather than infection    PLAN/RECOMMENDATIONS:    transition to a high flow nasal canula FiO2 50% @ 50lpm  portable CXR  would ask ID if the patient is a candidate for tocilizumab - patient is at increased risk for a poor outcome in the setting of obesity and absence of COVID vaccination  marked increase in d-dimer raises the possibility of pulmonary embolism  CTA chest  airborne and contact isolation  observe of antibacterial antibiotics and diuretics  symbicort/spiriva  mucinex 1200mg 2 times daily  remdesivir x 5 days - renal and liver function are stable  decadron 6mg IV/po x 10 days - following finger stick glucoses and treating hyperglycemia - should also treat bronchospasm  diligent DVT prophylaxis - SQ lovenox 40mg 2 times daily  following inflammatory markers - ferritin - d-dimer - CRP -> all significantly elevated  not a candidate for monoclonal antibodies on oxygen in the inpatient setting  consider orthopedic evaluation for right shoulder dislocation  should receive the COVID vaccination 30 days from discharge    Will follow with you. Plan of care discussed with the patient at bedside, the dedicated floor NP and with Dr. Owens.    Smith Robb MD, [facility]  [phone]  Pulmonary Medicine

## 2021-12-30 NOTE — PROGRESS NOTE ADULT - SUBJECTIVE AND OBJECTIVE BOX
Patient is a 86y old  Female who presents with a chief complaint of     SUBJECTIVE / OVERNIGHT EVENTS: worsening oxygenation, requiring on and off 100% NRB, doesnt tolerate HF, worsening cxr, rising inflammatory markers, neg dopplers for DVT, awaiting CTA chest. on Lovenox 40 bid, will need Tocilizumab,  ID called, check procalcitonin. this was d/w in detail w niece lizeth pelletier and left a VM with ptn's daughter    MEDICATIONS  (STANDING):  budesonide 160 MICROgram(s)/formoterol 4.5 MICROgram(s) Inhaler 2 Puff(s) Inhalation two times a day  dexAMETHasone  Injectable 6 milliGRAM(s) IV Push daily  enoxaparin Injectable 40 milliGRAM(s) SubCutaneous every 12 hours  guaiFENesin ER 1200 milliGRAM(s) Oral every 12 hours  influenza  Vaccine (HIGH DOSE) 0.7 milliLiter(s) IntraMuscular once  remdesivir  IVPB 100 milliGRAM(s) IV Intermittent every 24 hours  remdesivir  IVPB   IV Intermittent   tiotropium 18 MICROgram(s) Capsule 1 Capsule(s) Inhalation daily    MEDICATIONS  (PRN):      Vital Signs Last 24 Hrs  T(F): 97.5 (12-30-21 @ 16:23), Max: 98.4 (12-30-21 @ 00:04)  HR: 82 (12-30-21 @ 16:23) (82 - 90)  BP: 154/79 (12-30-21 @ 16:23) (150/68 - 160/69)  RR: 16 (12-30-21 @ 16:23) (16 - 22)  SpO2: 92% (12-30-21 @ 16:23) (88% - 97%)  Telemetry:   CAPILLARY BLOOD GLUCOSE        I&O's Summary    29 Dec 2021 07:01  -  30 Dec 2021 07:00  --------------------------------------------------------  IN: 240 mL / OUT: 1000 mL / NET: -760 mL    30 Dec 2021 07:01  -  30 Dec 2021 17:18  --------------------------------------------------------  IN: 240 mL / OUT: 400 mL / NET: -160 mL        PHYSICAL EXAM:  GENERAL: NAD, well-developed  HEAD:  Atraumatic, Normocephalic  EYES: EOMI, PERRLA, conjunctiva and sclera clear  NECK: Supple, No JVD  CHEST/LUNG: Clear to auscultation bilaterally; No wheeze  HEART: Regular rate and rhythm; No murmurs, rubs, or gallops  ABDOMEN: Soft, Nontender, Nondistended; Bowel sounds present  EXTREMITIES:  2+ Peripheral Pulses, No clubbing, cyanosis, or edema  PSYCH: AAOx3  NEUROLOGY: non-focal  SKIN: No rashes or lesions    LABS:                        13.8   11.63 )-----------( 251      ( 29 Dec 2021 09:27 )             43.7     12-29    141  |  103  |  29<H>  ----------------------------<  100<H>  4.1   |  25  |  0.87    Ca    8.7      29 Dec 2021 09:27    TPro  6.5  /  Alb  3.1<L>  /  TBili  0.4  /  DBili  <0.1  /  AST  31  /  ALT  23  /  AlkPhos  60  12-29              RADIOLOGY & ADDITIONAL TESTS:    Imaging Personally Reviewed:    Consultant(s) Notes Reviewed:      Care Discussed with Consultants/Other Providers:

## 2021-12-30 NOTE — CONSULT NOTE ADULT - SUBJECTIVE AND OBJECTIVE BOX
Patient is a 86y old  Female who presents with a chief complaint of     HPI:  87 yo F with no reported PMHx, presents to the ED for generalized weakness for the past few days. Pt has also had subjective fevers and her daughter called 911. EMS found the pt to be hypoxic to low 80s on RA, placed on NRB. Here, pt improved to high 90s on 4L NC. She denies SOB, CP, dizziness, n/v/d, abd pain, leg swelling.   Pt has not seen a doctor in over 6 years but pt has followed cardiology in the past, Dr. Elliot Caballero.   Daughter Daxa [phone] (27 Dec 2021 18:25)    ER vitals:  98.8, P 108, /94.  On supplemental O2, requiring nasal cannula 6 to 8 L.        REVIEW OF SYSTEMS:    CONSTITUTIONAL: c/o fatigue  EYES: No eye pain, visual disturbances, or discharge  ENMT:  No sore throat  NECK: No pain or stiffness  RESPIRATORY: No cough, wheezing, chills or hemoptysis; No shortness of breath  CARDIOVASCULAR: No chest pain, palpitations, dizziness, or leg swelling  GASTROINTESTINAL: No abdominal or epigastric pain. No nausea, vomiting, or hematemesis; No diarrhea or constipation. No melena or hematochezia.  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 aides     MEDICATIONS  (STANDING):  budesonide 160 MICROgram(s)/formoterol 4.5 MICROgram(s) Inhaler 2 Puff(s) Inhalation two times a day  dexAMETHasone  Injectable 6 milliGRAM(s) IV Push daily  enoxaparin Injectable 40 milliGRAM(s) SubCutaneous every 12 hours  guaiFENesin ER 1200 milliGRAM(s) Oral every 12 hours  influenza  Vaccine (HIGH DOSE) 0.7 milliLiter(s) IntraMuscular once  remdesivir  IVPB 100 milliGRAM(s) IV Intermittent every 24 hours  remdesivir  IVPB   IV Intermittent   tiotropium 18 MICROgram(s) Capsule 1 Capsule(s) Inhalation daily    MEDICATIONS  (PRN):      Vital Signs Last 24 Hrs  T(C): 36.9 (30 Dec 2021 08:48), Max: 36.9 (30 Dec 2021 00:04)  T(F): 98.4 (30 Dec 2021 08:48), Max: 98.4 (30 Dec 2021 00:04)  HR: 90 (30 Dec 2021 08:48) (86 - 90)  BP: 150/68 (30 Dec 2021 10:30) (120/84 - 160/69)  BP(mean): --  RR: 16 (30 Dec 2021 08:48) (16 - 22)  SpO2: 93% (30 Dec 2021 10:30) (88% - 97%)    PHYSICAL EXAM:    GENERAL: NAD, awake  HEAD:  Atraumatic, Normocephalic  EYES: EOMI, PERRLA, conjunctiva and sclera clear  ENMT: No tonsillar erythema, exudates, or enlargement; Moist mucous membranes  NECK: Supple, No JVD  CHEST/LUNG: Clear to percussion bilaterally; No rales, rhonchi, wheezing, or rubs  HEART: Regular rate and rhythm; No murmurs, rubs, or gallops  ABDOMEN: Soft, Nontender, Nondistended; Bowel sounds present  EXTREMITIES:  2+ Peripheral Pulses, No clubbing, cyanosis, or edema  LYMPH: No lymphadenopathy noted  SKIN: No rashes or lesions    LABS:  CBC Full  -  ( 29 Dec 2021 09:27 )  WBC Count : 11.63 K/uL  RBC Count : 4.81 M/uL  Hemoglobin : 13.8 g/dL  Hematocrit : 43.7 %  Platelet Count - Automated : 251 K/uL  Mean Cell Volume : 90.9 fl  Mean Cell Hemoglobin : 28.7 pg  Mean Cell Hemoglobin Concentration : 31.6 gm/dL  Auto Neutrophil # : x  Auto Lymphocyte # : x  Auto Monocyte # : x  Auto Eosinophil # : x  Auto Basophil # : x  Auto Neutrophil % : x  Auto Lymphocyte % : x  Auto Monocyte % : x  Auto Eosinophil % : x  Auto Basophil % : x      12-29    141  |  103  |  29<H>  ----------------------------<  100<H>  4.1   |  25  |  0.87    Ca    8.7      29 Dec 2021 09:27    TPro  6.5  /  Alb  3.1<L>  /  TBili  0.4  /  DBili  <0.1  /  AST  31  /  ALT  23  /  AlkPhos  60  12-29      LIVER FUNCTIONS - ( 29 Dec 2021 09:26 )  Alb: 3.1 g/dL / Pro: 6.5 g/dL / ALK PHOS: 60 U/L / ALT: 23 U/L / AST: 31 U/L / GGT: x               MICROBIOLOGY:    Respiratory Viral Panel with COVID-19 by [facility] (12.27.21 @ 16:49)   Rapid RVP Result: Detected   SARS-CoV-2: Detected: This Respiratory Panel uses polymerase chain reaction (PCR) to detect for   adenovirus; coronavirus (HKU1, NL63, 229E, OC43); human metapneumovirus   (hMPV); human enterovirus/rhinovirus (Entero/RV); influenza A; influenza   A/H1; influenza A/H3; influenza A/H1-2009; influenza B; parainfluenza   viruses 1, 2, 3, 4; respiratory syncytial virus; Mycoplasma pneumoniae;   Chlamydophila pneumoniae; and SARS-CoV-2.         RADIOLOGY:    < from: Xray Shoulder 2 Views, Right (12.29.21 @ 17:30) >  IMPRESSION:  Redemonstrated anteriorly dislocated right shoulder, likely chronic. AC   joint alignment maintained.    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 Degenerative spurring apparent along   the uncovered glenoid and humeral head articular margins.    Multiple old upper right rib fracture deformities also apparent.    No acute appearing fractures.    Generalized osteopenia otherwise no discrete lytic or blastic lesions.    < end of copied text >          < from: [facility] Duplex Lower Ext Vein Scan, Bilat (12.29.21 @ 11:04) >  EXAM:  DUPLEX SCAN EXT VEINS LOWER BI                          PROCEDURE DATE:  12/29/2021          INTERPRETATION:  CLINICAL INFORMATION: COVID positive, short of breath    COMPARISON: None available.    TECHNIQUE: Duplex sonography of the BILATERAL LOWER extremity veins with   color and spectral Doppler, with and without compression.    FINDINGS:    RIGHT:  Normal compressibility of the RIGHT common femoral, femoral and popliteal   veins.  Doppler examination shows normal spontaneous and phasic flow.  No RIGHT calf vein thrombosis is detected.    LEFT:  Normal compressibility of the LEFT common femoral, femoral and popliteal   veins.  Doppler examination shows normal spontaneous and phasic flow.  No LEFT calf vein thrombosisis detected.    IMPRESSION:  No evidence of deep venous thrombosis in either lower extremity.    < end of copied text >      < from: CT Chest No Cont (12.27.21 @ 18:58) >  IMPRESSION:    1.  Lung findings typically seen in COVID infection. Other infections are   also in the differential    2.  Moderate hiatal hernia    3.  Inferior right shoulder dislocation    < end of copied text >

## 2021-12-31 LAB
ALBUMIN SERPL ELPH-MCNC: 3 G/DL — LOW (ref 3.3–5)
ALP SERPL-CCNC: 65 U/L — SIGNIFICANT CHANGE UP (ref 40–120)
ALT FLD-CCNC: 14 U/L — SIGNIFICANT CHANGE UP (ref 10–45)
ANION GAP SERPL CALC-SCNC: 10 MMOL/L — SIGNIFICANT CHANGE UP (ref 5–17)
AST SERPL-CCNC: 12 U/L — SIGNIFICANT CHANGE UP (ref 10–40)
BILIRUB DIRECT SERPL-MCNC: 0.1 MG/DL — SIGNIFICANT CHANGE UP (ref 0–0.3)
BILIRUB INDIRECT FLD-MCNC: 0.3 MG/DL — SIGNIFICANT CHANGE UP (ref 0.2–1)
BILIRUB SERPL-MCNC: 0.4 MG/DL — SIGNIFICANT CHANGE UP (ref 0.2–1.2)
BUN SERPL-MCNC: 27 MG/DL — HIGH (ref 7–23)
CALCIUM SERPL-MCNC: 9.2 MG/DL — SIGNIFICANT CHANGE UP (ref 8.4–10.5)
CHLORIDE SERPL-SCNC: 101 MMOL/L — SIGNIFICANT CHANGE UP (ref 96–108)
CO2 SERPL-SCNC: 29 MMOL/L — SIGNIFICANT CHANGE UP (ref 22–31)
CREAT SERPL-MCNC: 0.71 MG/DL — SIGNIFICANT CHANGE UP (ref 0.5–1.3)
CREAT SERPL-MCNC: 0.73 MG/DL — SIGNIFICANT CHANGE UP (ref 0.5–1.3)
D DIMER BLD IA.RAPID-MCNC: 1109 NG/ML DDU — HIGH
FERRITIN SERPL-MCNC: 531 NG/ML — HIGH (ref 15–150)
GLUCOSE SERPL-MCNC: 146 MG/DL — HIGH (ref 70–99)
HCT VFR BLD CALC: 44.7 % — SIGNIFICANT CHANGE UP (ref 34.5–45)
HGB BLD-MCNC: 14.3 G/DL — SIGNIFICANT CHANGE UP (ref 11.5–15.5)
LDH SERPL L TO P-CCNC: 441 U/L — HIGH (ref 50–242)
MCHC RBC-ENTMCNC: 28.8 PG — SIGNIFICANT CHANGE UP (ref 27–34)
MCHC RBC-ENTMCNC: 32 GM/DL — SIGNIFICANT CHANGE UP (ref 32–36)
MCV RBC AUTO: 89.9 FL — SIGNIFICANT CHANGE UP (ref 80–100)
NRBC # BLD: 0 /100 WBCS — SIGNIFICANT CHANGE UP (ref 0–0)
PLATELET # BLD AUTO: 293 K/UL — SIGNIFICANT CHANGE UP (ref 150–400)
POTASSIUM SERPL-MCNC: 4.4 MMOL/L — SIGNIFICANT CHANGE UP (ref 3.5–5.3)
POTASSIUM SERPL-SCNC: 4.4 MMOL/L — SIGNIFICANT CHANGE UP (ref 3.5–5.3)
PROT SERPL-MCNC: 6 G/DL — SIGNIFICANT CHANGE UP (ref 6–8.3)
RBC # BLD: 4.97 M/UL — SIGNIFICANT CHANGE UP (ref 3.8–5.2)
RBC # FLD: 13.7 % — SIGNIFICANT CHANGE UP (ref 10.3–14.5)
SODIUM SERPL-SCNC: 140 MMOL/L — SIGNIFICANT CHANGE UP (ref 135–145)
WBC # BLD: 11.9 K/UL — HIGH (ref 3.8–10.5)
WBC # FLD AUTO: 11.9 K/UL — HIGH (ref 3.8–10.5)

## 2021-12-31 RX ADMIN — Medication 6 MILLIGRAM(S): at 06:31

## 2021-12-31 RX ADMIN — BUDESONIDE AND FORMOTEROL FUMARATE DIHYDRATE 2 PUFF(S): 160; 4.5 AEROSOL RESPIRATORY (INHALATION) at 17:15

## 2021-12-31 RX ADMIN — ENOXAPARIN SODIUM 40 MILLIGRAM(S): 100 INJECTION SUBCUTANEOUS at 06:31

## 2021-12-31 RX ADMIN — ENOXAPARIN SODIUM 40 MILLIGRAM(S): 100 INJECTION SUBCUTANEOUS at 17:18

## 2021-12-31 RX ADMIN — Medication 1200 MILLIGRAM(S): at 06:31

## 2021-12-31 RX ADMIN — TIOTROPIUM BROMIDE 1 CAPSULE(S): 18 CAPSULE ORAL; RESPIRATORY (INHALATION) at 11:13

## 2021-12-31 RX ADMIN — Medication 1200 MILLIGRAM(S): at 17:15

## 2021-12-31 RX ADMIN — BUDESONIDE AND FORMOTEROL FUMARATE DIHYDRATE 2 PUFF(S): 160; 4.5 AEROSOL RESPIRATORY (INHALATION) at 06:32

## 2021-12-31 RX ADMIN — REMDESIVIR 500 MILLIGRAM(S): 5 INJECTION INTRAVENOUS at 17:17

## 2021-12-31 NOTE — PROGRESS NOTE ADULT - SUBJECTIVE AND OBJECTIVE BOX
[facility] - PROGRESS NOTE    COVID-19 related pneumonia; acute hypoxic respiratory failure; bronchospasm; obesity; lethargy    INTERVAL HISTORY: CTA - no pulmonary embolism -  increasing diffuse bilateral peripheral and peribronchial vascular groundglass opacities c/w worsening COVID pneumonia; awake and alert; no shortness of breath or hypoxemia on a nasal canula @ 8lpm; occasional cough productive of scant sputum; resolved chest congestion and wheeze; no fevers, chills or sweats; no chest pain/pressure or palpitations; less fatigue, malaise and weakness; quite tired and wants to take a nap    REVIEW OF SYSTEMS:  Constitutional: As per interval history  HEENT: Within normal limits  CV: As per interval history  Resp: As per interval history  GI: Within normal limits   : Within normal limits  Musculoskeletal: shoulder arthritis - dislocated right shoulder  Skin: Within normal limits  Neurological: lethargic  Psychiatric: Within normal limits  Endocrine: Within normal limits  Hematologic/Lymphatic: Within normal limits  Allergic/Immunologic: Within normal limits    MEDICATIONS:     Pulmonary "  budesonide 160 MICROgram(s)/formoterol 4.5 MICROgram(s) Inhaler 2 Puff(s) Inhalation two times a day  guaiFENesin ER 1200 milliGRAM(s) Oral every 12 hours  tiotropium 18 MICROgram(s) Capsule 1 Capsule(s) Inhalation daily    Anti-microbials:  remdesivir  IVPB 100 milliGRAM(s) IV Intermittent every 24 hours  remdesivir  IVPB   IV Intermittent     Cardiovascular:    Other:  dexAMETHasone  Injectable 6 milliGRAM(s) IV Push daily  enoxaparin Injectable 40 milliGRAM(s) SubCutaneous every 12 hours  influenza  Vaccine (HIGH DOSE) 0.7 milliLiter(s) IntraMuscular once    OBJECTIVE:    I&O's Detail    30 Dec 2021 07:01  -  31 Dec 2021 07:00  --------------------------------------------------------  IN:    IV PiggyBack: 250 mL    Oral Fluid: 480 mL  Total IN: 730 mL    OUT:    Voided (mL): 700 mL  Total OUT: 700 mL    Total NET: 30 mL    PHYSICAL EXAM:       ICU Vital Signs Last 24 Hrs  T(C): 36.4 (31 Dec 2021 11:04), Max: 36.9 (31 Dec 2021 00:53)  T(F): 97.5 (31 Dec 2021 11:04), Max: 98.4 (31 Dec 2021 00:53)  HR: 80 (31 Dec 2021 11:04) (75 - 82)  BP: 156/64 (31 Dec 2021 11:04) (152/67 - 159/95)  BP(mean): --  ABP: --  ABP(mean): --  RR: 18 (31 Dec 2021 11:04) (16 - 18)  SpO2: 96% (31 Dec 2021 11:04) (92% - 98%) on 8lpm nasal canula    General: Awake and alert. Cooperative. No distress. Appears stated age. Obese	  HEENT: Atraumatic. Normocephalic. Anicteric. Normal oral mucosa. PERRL. EOMI.  Neck: Supple. Trachea midline. Thyroid without enlargement/tenderness/nodules. No carotid bruit. No JVD.	  Cardiovascular: Regular rate and rhythm. S1 S2 normal. II/VI systolic murmur.  Respiratory: Respirations unlabored. Diffuse rales. Decreased wheeze. Kyphosis.  Abdomen: Soft. Non-tender. Non-distended. No organomegaly. No masses. Normal bowel sounds. Obese.  Extremities: Warm to touch. No clubbing or cyanosis. No pedal edema.  Pulses: 2+ peripheral pulses all extremities.	  Skin: Normal skin color. No rashes or lesions. No ecchymoses. No cyanosis. Warm to touch.  Lymph Nodes: Cervical, supraclavicular and axillary nodes normal  Neurological: Motor and sensory examination equal and normal. A and O x 3  Psychiatry: Appropriate mood and affect.    LABS:      CBC    WBC  11.63 <==, 7.52 <==, 9.02 <==    Hemoglobin  13.8 <<==, 12.4 <<==, 13.4 <<==    Hematocrit  43.7 <==, 39.8 <==, 41.5 <==    Platelets  251 <==, 199 <==, 190 <==      141  |  103  |  29<H>  ----------------------------<  100<H>    12-29  4.1   |  25  |  0.87      LYTES    sodium  141 <==, 139 <==, 137 <==    potassium   4.1 <==, 3.9 <==, 3.8 <==    chloride  103 <==, 103 <==, 101 <==    carbon dioxide  25 <==, 23 <==, 22 <==    =============================================================================================  RENAL FUNCTION:    Creatinine:   0.87  <<==, 0.85  <<==, 0.91  <<==, 0.88  <<==, 0.88  <<==    BUN:   29 <==, 22 <==, 20 <==    ============================================================================================    calcium   8.7 <==, 8.6 <==, 9.0 <==    ============================================================================================  LFTs    AST:   31 <== , 31 <== , 48 <==     ALT:  23  <== , 21  <== , 25  <==     AP:  60  <=, 59  <=, 66  <=    Bili:  0.4  <=, 0.3  <=, 0.5  <=    PT/INR - ( 27 Dec 2021 16:51 )   PT: 13.0 sec;   INR: 1.09 ratio      PTT - ( 27 Dec 2021 16:51 )  PTT: 26.3 sec    Venous Blood Gas:  12-27 @ 16:50  7.41/45/20/28/37.1  VBG Lactate: 2.2    Procalcitonin, Serum: 0.17 ng/mL (12-27 @ 16:51)    Serum Pro-Brain Natriuretic Peptide: 185 pg/mL (12-27 @ 19:30)  Serum Pro-Brain Natriuretic Peptide: 169 pg/mL (12-27 @ 16:51)    D-Dimer Assay, Quantitative (12.29.21 @ 09:26)   D-Dimer Assay, Quantitative: 4014  D-Dimer Assay, Quantitative (12.27.21 @ 19:30)   D-Dimer Assay, Quantitative: 1014  D-Dimer Assay, Quantitative (12.27.21 @ 16:51)   D-Dimer Assay, Quantitative: 861    C-Reactive Protein, Serum (12.27.21 @ 16:51)   C-Reactive Protein, Serum: 133 mg/L     Ferritin, Serum in AM (12.29.21 @ 11:37)   Ferritin, Serum: 717 ng/mL   Ferritin, Serum (12.27.21 @ 23:22)   Ferritin, Serum: 799 ng/mL   CARDIAC MARKERS ( 27 Dec 2021 16:51 )  CPK x     /CKMB x     /CKMB Units x        troponin 31 ng/L    MICROBIOLOGY:     Respiratory Viral Panel with COVID-19 by MONTY (12.27.21 @ 16:49)   Rapid RVP Result: Detected   SARS-CoV-2: Detected:    RADIOLOGY:  [x ] Chest radiographs reviewed and interpreted by me    EXAM:  CT ANGIO CHEST PULM [facility]                          PROCEDURE DATE:  12/30/2021      INTERPRETATION:  CLINICAL INFORMATION: Hypoxia and elevated d-dimer.   Admitted with Covid 19 infection.    FINDINGS:    LUNGS AND AIRWAYS: Trachea is deviated to the right. Tracheobronchial   secretions. Diffuse bilateral peripheral and peribronchial vascular   groundglass opacities, increased when compared with 12/27/2021.  PLEURA: No pleural effusion.  MEDIASTINUM AND JAMILAH: No lymphadenopathy.  VESSELS: No pulmonary embolism. Aortic and coronary artery calcifications.  HEART: Heart size is normal. No pericardial effusion.  CHEST WALL AND LOWER NECK: 1.1 cm left medial breast subcutaneous nodule,   probably a sebaceous cyst. Thyroid is unremarkable.  VISUALIZED UPPER ABDOMEN: Moderate hiatal hernia. 1.1 cm left adrenal   adenoma. 3.4 cm isodense round lesion adjacent to stomach (1, 108)  BONES: Chronic bilateral rib fractures. Inferior right shoulder   dislocation. 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   Multilevel degenerative changes of the thoracic spine. Dextroscoliotic   thoracic curvature.    IMPRESSION:  No pulmonary embolism.    Increased bilateral groundglass opacities, consistent with known Covid 19   infection.    3.4 cm isodense round lesion adjacent to stomach. 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 CT abdomen/pelvis with contrast recommended   for complete evaluation.    PAM LEIVA MD; Resident Radiology  This document has been electronically signed.  OPAL GUZMAN MD; Attending Radiologist  This document has been electronically signed. Dec [id number]  9:43AM  ---------------------------------------------------------------------------------------------------------------  < from: CT Chest No Cont (12.27.21 @ 18:58) >    EXAM:  CT CHEST                          PROCEDURE DATE:  12/27/2021      INTERPRETATION:  CLINICAL INFORMATION: Hypoxia    FINDINGS:    Lungs/Airways/Pleura: There are ill defined peripheral and   peribronchovascular predominant ground glass densities throughout both   lungs. The airways are patent. No pleural effusion.    Mediastinum/Lymph nodes: No thoracic adenopathy. There is a moderate   hiatal hernia.    Heart and Vessels: The cardiac chambers are normal in size. There are   mild coronary artery calcifications. No pericardial effusion. The aorta   is normal in caliber.    Upper Abdomen: Unremarkable.    Osseous structures and Soft Tissues: There are old/healing bilateral rib   fractures. No aggressive bone lesions. There is an inferior right   shoulder dislocation. There are bilateral glenohumeral joint effusions.    IMPRESSION:    1.  Lung findings typically seen in COVID infection. Other infections are   also in the differential    2.  Moderate hiatal hernia    3.  Inferior right shoulder dislocation    AMANDA MCGUIRE M.D., Attending Radiologist  This document has been electronically signed. Dec 28 2021  9:54AM  --------------------------------------------------------------------------------------------------------------  EXAM:  XR CHEST PORTABLE URGENT 1V                          PROCEDURE DATE:  12/27/2021      INTERPRETATION:  CLINICAL INFORMATION: Shortness of breath, cough and   fever    TECHNIQUE: AP view(s) of the chest.    COMPARISON: No comparison available.    FINDINGS:  The cardiac silhouette is normal in size. Bilateral lower lung patchy   airspace opacities. No pleural effusion or pneumothorax. No acute bony   abnormality.    IMPRESSION:  Bilateral lower lung patchy airspace opacities.    NICK DENT DO; Resident Radiologist  This document has been electronically signed.  CANDIDO GARCIA MD; Attending Radiologist  This document has been electronically signed. Dec 28 2021 12:06AM    ---------------------------------------------------------------------------------------------------------------  < from: Xray Shoulder 2 Views, Right (12.29.21 @ 17:30) >    EXAM:  XR SHOULDER COMP MIN 2V RT                          PROCEDURE DATE:  12/29/2021      INTERPRETATION:  CLINICAL INDICATION: radiographic correlation of   dislocated right shoulder detected on recent CT    EXAM:  AP and transscapular Y right shoulder from 12/29/2021 at 1730. Reviewed   in conjunction with chest CT from 12/27/2021.    IMPRESSION:  Redemonstrated anteriorly dislocated right shoulder, likely chronic. AC   joint alignment maintained.    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 Degenerative spurring apparent along   the uncovered glenoid and humeral head articular margins.    Multiple old upper right rib fracture deformities also apparent.    No acute appearing fractures.    Generalized osteopenia otherwise no discrete lytic or blastic lesions.    SANTHOSH NGUYEN MD; Attending Radiologist  This document has been electronically signed. Dec 29 2021  5:37PM    ---------------------------------------------------------------------------------------------------------------

## 2021-12-31 NOTE — PROGRESS NOTE ADULT - ASSESSMENT
ASSESSMENT:    unvaccinated woman without significant past medical/surgical history admitted with COVID-19 infection complicated by acute hypoxic respiratory failure due to extensive pneumonia, bronchospasm and obesity related restrictive lung disease - CT chest "to my eye" reveals bilateral multifocal peripheral ground glass opacities c/w COVID related pneumonia - no evidence of pulmonary edema or pleural effusions especially in the setting of a non-elevated pro-BNP level - no evidence of a superimposed bacterial infection in the absence of an elevated procalcitonin level - suspect the new leukocytosis is due to high dose steroids rather than infection    PLAN/RECOMMENDATIONS:    airborne and contact isolation  oxygen supplementation to keep saturation greater than 92% - currently on an 8lpm nasal canula - refused a high flow nasal canula - humidify oxygen  CTA chest - no pulmonary embolism -  increasing diffuse bilateral peripheral and peribronchial vascular groundglass opacities c/w worsening COVID pneumonia  awaiting ID decision regarding administration of tocilizumab - patient is at increased risk for a poor outcome in the setting of obesity and absence of COVID vaccination - her pneumonia and Aa gradient have worsened - d-dimer has increased markedly  observe of antibacterial antibiotics and diuretics  symbicort/spiriva  mucinex 1200mg 2 times daily  remdesivir x 5 days - renal and liver function are stable  decadron 6mg IV/po x 10 days - following finger stick glucoses and treating hyperglycemia - should also treat bronchospasm  diligent DVT prophylaxis - SQ lovenox 40mg 2 times daily  following inflammatory markers - ferritin - d-dimer - CRP -> all significantly elevated  not a candidate for monoclonal antibodies on oxygen in the inpatient setting  consider orthopedic evaluation for right shoulder dislocation  should receive the COVID vaccination 30 days from discharge    Will follow with you. Plan of care discussed with the patient at bedside and with Dr. Owens.    Smith Robb MD, [facility]  [phone]  Pulmonary Medicine

## 2021-12-31 NOTE — PROGRESS NOTE ADULT - ASSESSMENT
87 yo F with no reported PMHx, presents to the ED for generalized weakness for the past few days. Pt has also had subjective fevers and her daughter called 911. EMS found the pt to be hypoxic to low 80s on RA, placed on NRB. Here, pt improved to high 90s on 4L NC. She denies SOB, CP, dizziness, n/v/d, abd pain, leg swelling.   ptn is covid positive, admitted with acute hypoxic respiratory failure,   ID and Pulm on board,   chest ct neg for PNA on admission, and CTA chest neg for PE 12/30  DDimer is close to 900 on admission,  rrose on 12/30 with higher O2 demand, now improving   Symbicort and proventil HFA,   completed remdesivir, cont dexamethasone iv.   worsening oxygenation, requiring on and off 100% NRB on 12/30,   today stable on 8L O2NC, doesnt tolerate HF  neg dopplers for DVT,  on Lovenox 40 bid,   this was d/w in detail  ptn's daughter kp x 45 min.   ptn is DNR

## 2021-12-31 NOTE — PROGRESS NOTE ADULT - SUBJECTIVE AND OBJECTIVE BOX
Patient is a 86y old  Female who presents with a chief complaint of     SUBJECTIVE / OVERNIGHT EVENTS: ptn feels better, o2 demand hasnt gotten worse, DDimer is better today, CTA chest neg for PE, will hold off on TOCI. spoke with daughter in detail     MEDICATIONS  (STANDING):  budesonide 160 MICROgram(s)/formoterol 4.5 MICROgram(s) Inhaler 2 Puff(s) Inhalation two times a day  dexAMETHasone  Injectable 6 milliGRAM(s) IV Push daily  enoxaparin Injectable 40 milliGRAM(s) SubCutaneous every 12 hours  guaiFENesin ER 1200 milliGRAM(s) Oral every 12 hours  influenza  Vaccine (HIGH DOSE) 0.7 milliLiter(s) IntraMuscular once  tiotropium 18 MICROgram(s) Capsule 1 Capsule(s) Inhalation daily    MEDICATIONS  (PRN):      Vital Signs Last 24 Hrs  T(F): 97.6 (12-31-21 @ 14:38), Max: 98.4 (12-31-21 @ 00:53)  HR: 76 (12-31-21 @ 14:38) (75 - 82)  BP: 146/72 (12-31-21 @ 14:38) (146/72 - 159/95)  RR: 18 (12-31-21 @ 14:38) (16 - 18)  SpO2: 97% (12-31-21 @ 14:38) (92% - 98%)  Telemetry:   CAPILLARY BLOOD GLUCOSE        I&O's Summary    30 Dec 2021 07:01  -  31 Dec 2021 07:00  --------------------------------------------------------  IN: 730 mL / OUT: 700 mL / NET: 30 mL    31 Dec 2021 07:01  -  31 Dec 2021 18:50  --------------------------------------------------------  IN: 790 mL / OUT: 350 mL / NET: 440 mL        PHYSICAL EXAM:  GENERAL: NAD, well-developed  HEAD:  Atraumatic, Normocephalic  EYES: EOMI, PERRLA, conjunctiva and sclera clear  NECK: Supple, No JVD  CHEST/LUNG: Clear to auscultation bilaterally; No wheeze  HEART: Regular rate and rhythm; No murmurs, rubs, or gallops  ABDOMEN: Soft, Nontender, Nondistended; Bowel sounds present  EXTREMITIES:  2+ Peripheral Pulses, No clubbing, cyanosis, or edema  PSYCH: AAOx3  NEUROLOGY: non-focal  SKIN: No rashes or lesions    LABS:                        14.3   11.90 )-----------( 293      ( 31 Dec 2021 12:14 )             44.7     12-31    140  |  101  |  27<H>  ----------------------------<  146<H>  4.4   |  29  |  0.73    Ca    9.2      31 Dec 2021 12:14    TPro  6.0  /  Alb  3.0<L>  /  TBili  0.4  /  DBili  0.1  /  AST  12  /  ALT  14  /  AlkPhos  65  12-31              RADIOLOGY & ADDITIONAL TESTS:    Imaging Personally Reviewed:    Consultant(s) Notes Reviewed:      Care Discussed with Consultants/Other Providers:

## 2022-01-01 LAB
ALBUMIN SERPL ELPH-MCNC: 2.7 G/DL — LOW (ref 3.3–5)
ALP SERPL-CCNC: 64 U/L — SIGNIFICANT CHANGE UP (ref 40–120)
ALT FLD-CCNC: 10 U/L — SIGNIFICANT CHANGE UP (ref 10–45)
ANION GAP SERPL CALC-SCNC: 13 MMOL/L — SIGNIFICANT CHANGE UP (ref 5–17)
AST SERPL-CCNC: 11 U/L — SIGNIFICANT CHANGE UP (ref 10–40)
BILIRUB DIRECT SERPL-MCNC: 0.1 MG/DL — SIGNIFICANT CHANGE UP (ref 0–0.3)
BILIRUB INDIRECT FLD-MCNC: 0.3 MG/DL — SIGNIFICANT CHANGE UP (ref 0.2–1)
BILIRUB SERPL-MCNC: 0.4 MG/DL — SIGNIFICANT CHANGE UP (ref 0.2–1.2)
BUN SERPL-MCNC: 25 MG/DL — HIGH (ref 7–23)
CALCIUM SERPL-MCNC: 8.4 MG/DL — SIGNIFICANT CHANGE UP (ref 8.4–10.5)
CHLORIDE SERPL-SCNC: 103 MMOL/L — SIGNIFICANT CHANGE UP (ref 96–108)
CO2 SERPL-SCNC: 24 MMOL/L — SIGNIFICANT CHANGE UP (ref 22–31)
CREAT SERPL-MCNC: 0.65 MG/DL — SIGNIFICANT CHANGE UP (ref 0.5–1.3)
CREAT SERPL-MCNC: 0.67 MG/DL — SIGNIFICANT CHANGE UP (ref 0.5–1.3)
GLUCOSE SERPL-MCNC: 83 MG/DL — SIGNIFICANT CHANGE UP (ref 70–99)
HCT VFR BLD CALC: 44.1 % — SIGNIFICANT CHANGE UP (ref 34.5–45)
HGB BLD-MCNC: 14.1 G/DL — SIGNIFICANT CHANGE UP (ref 11.5–15.5)
MCHC RBC-ENTMCNC: 28.8 PG — SIGNIFICANT CHANGE UP (ref 27–34)
MCHC RBC-ENTMCNC: 32 GM/DL — SIGNIFICANT CHANGE UP (ref 32–36)
MCV RBC AUTO: 90 FL — SIGNIFICANT CHANGE UP (ref 80–100)
NRBC # BLD: 0 /100 WBCS — SIGNIFICANT CHANGE UP (ref 0–0)
PLATELET # BLD AUTO: 296 K/UL — SIGNIFICANT CHANGE UP (ref 150–400)
POTASSIUM SERPL-MCNC: 4.2 MMOL/L — SIGNIFICANT CHANGE UP (ref 3.5–5.3)
POTASSIUM SERPL-SCNC: 4.2 MMOL/L — SIGNIFICANT CHANGE UP (ref 3.5–5.3)
PROT SERPL-MCNC: 5.8 G/DL — LOW (ref 6–8.3)
RBC # BLD: 4.9 M/UL — SIGNIFICANT CHANGE UP (ref 3.8–5.2)
RBC # FLD: 13.6 % — SIGNIFICANT CHANGE UP (ref 10.3–14.5)
SODIUM SERPL-SCNC: 140 MMOL/L — SIGNIFICANT CHANGE UP (ref 135–145)
WBC # BLD: 10.93 K/UL — HIGH (ref 3.8–10.5)
WBC # FLD AUTO: 10.93 K/UL — HIGH (ref 3.8–10.5)

## 2022-01-01 RX ORDER — AMLODIPINE BESYLATE 2.5 MG/1
5 TABLET ORAL DAILY
Refills: 0 | Status: DISCONTINUED | OUTPATIENT
Start: 2022-01-01 | End: 2022-01-11

## 2022-01-01 RX ADMIN — ENOXAPARIN SODIUM 40 MILLIGRAM(S): 100 INJECTION SUBCUTANEOUS at 05:58

## 2022-01-01 RX ADMIN — Medication 6 MILLIGRAM(S): at 05:57

## 2022-01-01 RX ADMIN — BUDESONIDE AND FORMOTEROL FUMARATE DIHYDRATE 2 PUFF(S): 160; 4.5 AEROSOL RESPIRATORY (INHALATION) at 17:53

## 2022-01-01 RX ADMIN — ENOXAPARIN SODIUM 40 MILLIGRAM(S): 100 INJECTION SUBCUTANEOUS at 17:52

## 2022-01-01 RX ADMIN — Medication 1200 MILLIGRAM(S): at 17:53

## 2022-01-01 RX ADMIN — BUDESONIDE AND FORMOTEROL FUMARATE DIHYDRATE 2 PUFF(S): 160; 4.5 AEROSOL RESPIRATORY (INHALATION) at 05:58

## 2022-01-01 RX ADMIN — AMLODIPINE BESYLATE 5 MILLIGRAM(S): 2.5 TABLET ORAL at 20:45

## 2022-01-01 RX ADMIN — TIOTROPIUM BROMIDE 1 CAPSULE(S): 18 CAPSULE ORAL; RESPIRATORY (INHALATION) at 12:57

## 2022-01-01 NOTE — PROGRESS NOTE ADULT - SUBJECTIVE AND OBJECTIVE BOX
Follow-up Pulm Progress Note - [facility] [place]    Pt appears comfortable, complaining she is tired, but in no distress    Medications:  MEDICATIONS  (STANDING):  budesonide 160 MICROgram(s)/formoterol 4.5 MICROgram(s) Inhaler 2 Puff(s) Inhalation two times a day  dexAMETHasone  Injectable 6 milliGRAM(s) IV Push daily  enoxaparin Injectable 40 milliGRAM(s) SubCutaneous every 12 hours  guaiFENesin ER 1200 milliGRAM(s) Oral every 12 hours  influenza  Vaccine (HIGH DOSE) 0.7 milliLiter(s) IntraMuscular once  tiotropium 18 MICROgram(s) Capsule 1 Capsule(s) Inhalation daily    MEDICATIONS  (PRN):             Vital Signs Last 24 Hrs  T(C): 36.6 (01 Jan 2022 09:28), Max: 36.9 (31 Dec 2021 19:52)  T(F): 97.8 (01 Jan 2022 09:28), Max: 98.4 (31 Dec 2021 19:52)  HR: 84 (01 Jan 2022 09:28) (76 - 84)  BP: 171/88 (01 Jan 2022 09:28) (145/68 - 171/88)  BP(mean): --  RR: 18 (01 Jan 2022 09:28) (18 - 20)  SpO2: 96% (01 Jan 2022 09:28) (95% - 98%)          12-31 @ 07:01  -  01-01 @ 07:00  --------------------------------------------------------  IN: 790 mL / OUT: 650 mL / NET: 140 mL          LABS:                        14.1   10.93 )-----------( 296      ( 01 Jan 2022 07:43 )             44.1     01-01    140  |  103  |  25<H>  ----------------------------<  83  4.2   |  24  |  0.67    Ca    8.4      01 Jan 2022 07:43    TPro  5.8<L>  /  Alb  2.7<L>  /  TBili  0.4  /  DBili  0.1  /  AST  11  /  ALT  10  /  AlkPhos  64  01-01         Physical Examination:  Awake and alert  Normocephalic atraumatic  NECK: supple, normal range of motion, no use of accessory muscles  PULM: Clear to auscultation bilaterally, with diffuse rales at bases  CVS: Regular rate and rhythm, no murmurs, rubs, or gallops  Abd:  soft, non tender  Extrem: No CCE

## 2022-01-01 NOTE — PROGRESS NOTE ADULT - ASSESSMENT
ASSESSMENT:    unvaccinated woman without significant past medical/surgical history admitted with COVID-19 infection complicated by acute hypoxic respiratory failure due to extensive pneumonia, bronchospasm and obesity related restrictive lung disease - CT chest reveals bilateral multifocal peripheral ground glass opacities c/w COVID related pneumonia - no evidence of pulmonary edema or pleural effusions especially in the setting of a non-elevated pro-BNP level - no evidence of a superimposed bacterial infection in the absence of an elevated procalcitonin level - suspect the new leukocytosis is due to high dose steroids rather than infection.  Repeat cta negative for PE but worsened infiltrates    PLAN/RECOMMENDATIONS:       oxygen supplementation to keep saturation greater than 92% - currently on an 8lpm nasal canula, she has improved somewhat  awaiting ID decision regarding administration of tocilizumab - although since her ferritin and d dimer have begun to decrease  observe of antibacterial antibiotics and diuretics  symbicort/spiriva  mucinex 1200mg 2 times daily  completed 5 days of remdesivir yesterday- renal and liver function are stable  decadron 6mg IV/po day #6/10 days - following finger stick glucoses and treating hyperglycemia - should also treat bronchospasm  diligent DVT prophylaxis - SQ lovenox 40mg 2 times daily  following inflammatory markers - ferritin - d-dimer - CRP -> have begun to decrease at this time  consider orthopedic evaluation for right shoulder dislocation  should receive the COVID vaccination 30 days from discharge    Pt is DNR       Meg Corrigan MD, [facility]  [phone]  Pulmonary Medicine

## 2022-01-02 LAB
ALBUMIN SERPL ELPH-MCNC: 2.7 G/DL — LOW (ref 3.3–5)
ALP SERPL-CCNC: 72 U/L — SIGNIFICANT CHANGE UP (ref 40–120)
ALT FLD-CCNC: 7 U/L — LOW (ref 10–45)
AST SERPL-CCNC: 11 U/L — SIGNIFICANT CHANGE UP (ref 10–40)
BILIRUB DIRECT SERPL-MCNC: 0.1 MG/DL — SIGNIFICANT CHANGE UP (ref 0–0.3)
BILIRUB INDIRECT FLD-MCNC: 0.3 MG/DL — SIGNIFICANT CHANGE UP (ref 0.2–1)
BILIRUB SERPL-MCNC: 0.4 MG/DL — SIGNIFICANT CHANGE UP (ref 0.2–1.2)
CREAT SERPL-MCNC: 0.7 MG/DL — SIGNIFICANT CHANGE UP (ref 0.5–1.3)
PROT SERPL-MCNC: 5.9 G/DL — LOW (ref 6–8.3)

## 2022-01-02 RX ADMIN — BUDESONIDE AND FORMOTEROL FUMARATE DIHYDRATE 2 PUFF(S): 160; 4.5 AEROSOL RESPIRATORY (INHALATION) at 17:20

## 2022-01-02 RX ADMIN — TIOTROPIUM BROMIDE 1 CAPSULE(S): 18 CAPSULE ORAL; RESPIRATORY (INHALATION) at 11:26

## 2022-01-02 RX ADMIN — Medication 1200 MILLIGRAM(S): at 17:20

## 2022-01-02 RX ADMIN — Medication 6 MILLIGRAM(S): at 05:28

## 2022-01-02 RX ADMIN — ENOXAPARIN SODIUM 40 MILLIGRAM(S): 100 INJECTION SUBCUTANEOUS at 17:45

## 2022-01-02 RX ADMIN — BUDESONIDE AND FORMOTEROL FUMARATE DIHYDRATE 2 PUFF(S): 160; 4.5 AEROSOL RESPIRATORY (INHALATION) at 05:28

## 2022-01-02 RX ADMIN — ENOXAPARIN SODIUM 40 MILLIGRAM(S): 100 INJECTION SUBCUTANEOUS at 05:28

## 2022-01-02 RX ADMIN — Medication 1200 MILLIGRAM(S): at 05:29

## 2022-01-02 NOTE — PROGRESS NOTE ADULT - SUBJECTIVE AND OBJECTIVE BOX
Patient is a 86y old  Female who presents with a chief complaint of     SUBJECTIVE / OVERNIGHT EVENTS: ptn cont to require 8 liters O2, no resp distress, denies having pain in right shoulder, has chronic AC dislocation, mult healed rib fractures    MEDICATIONS  (STANDING):  amLODIPine   Tablet 5 milliGRAM(s) Oral daily  budesonide 160 MICROgram(s)/formoterol 4.5 MICROgram(s) Inhaler 2 Puff(s) Inhalation two times a day  dexAMETHasone  Injectable 6 milliGRAM(s) IV Push daily  enoxaparin Injectable 40 milliGRAM(s) SubCutaneous every 12 hours  guaiFENesin ER 1200 milliGRAM(s) Oral every 12 hours  influenza  Vaccine (HIGH DOSE) 0.7 milliLiter(s) IntraMuscular once  tiotropium 18 MICROgram(s) Capsule 1 Capsule(s) Inhalation daily    MEDICATIONS  (PRN):      Vital Signs Last 24 Hrs  T(F): 97.8 (01-02-22 @ 15:50), Max: 97.8 (01-02-22 @ 15:50)  HR: 73 (01-02-22 @ 15:50) (73 - 84)  BP: 121/63 (01-02-22 @ 15:50) (121/63 - 152/96)  RR: 18 (01-02-22 @ 15:50) (18 - 18)  SpO2: 98% (01-02-22 @ 15:50) (92% - 98%)  Telemetry:   CAPILLARY BLOOD GLUCOSE        I&O's Summary    01 Jan 2022 07:01  -  02 Jan 2022 07:00  --------------------------------------------------------  IN: 400 mL / OUT: 1050 mL / NET: -650 mL    02 Jan 2022 07:01  -  02 Jan 2022 20:40  --------------------------------------------------------  IN: 0 mL / OUT: 350 mL / NET: -350 mL        PHYSICAL EXAM:  GENERAL: NAD, well-developed  HEAD:  Atraumatic, Normocephalic  EYES: EOMI, PERRLA, conjunctiva and sclera clear  NECK: Supple, No JVD  CHEST/LUNG: Clear to auscultation bilaterally; No wheeze  HEART: Regular rate and rhythm; No murmurs, rubs, or gallops  ABDOMEN: Soft, Nontender, Nondistended; Bowel sounds present  EXTREMITIES:  2+ Peripheral Pulses, No clubbing, cyanosis, or edema  PSYCH: AAOx3  NEUROLOGY: non-focal  SKIN: No rashes or lesions    LABS:                        14.1   10.93 )-----------( 296      ( 01 Jan 2022 07:43 )             44.1     01-02    x   |  x   |  x   ----------------------------<  x   x    |  x   |  0.70    Ca    8.4      01 Jan 2022 07:43    TPro  5.9<L>  /  Alb  2.7<L>  /  TBili  0.4  /  DBili  0.1  /  AST  11  /  ALT  7<L>  /  AlkPhos  72  01-02              RADIOLOGY & ADDITIONAL TESTS:    Imaging Personally Reviewed:    Consultant(s) Notes Reviewed:      Care Discussed with Consultants/Other Providers:

## 2022-01-02 NOTE — PROGRESS NOTE ADULT - SUBJECTIVE AND OBJECTIVE BOX
Follow-up Pulm Progress Note - [facility] [place]    Pt is not in distress, she is complaining of still being very tired.  Eating little.    Medications:  MEDICATIONS  (STANDING):  amLODIPine   Tablet 5 milliGRAM(s) Oral daily  budesonide 160 MICROgram(s)/formoterol 4.5 MICROgram(s) Inhaler 2 Puff(s) Inhalation two times a day  dexAMETHasone  Injectable 6 milliGRAM(s) IV Push daily  enoxaparin Injectable 40 milliGRAM(s) SubCutaneous every 12 hours  guaiFENesin ER 1200 milliGRAM(s) Oral every 12 hours  influenza  Vaccine (HIGH DOSE) 0.7 milliLiter(s) IntraMuscular once  tiotropium 18 MICROgram(s) Capsule 1 Capsule(s) Inhalation daily    MEDICATIONS  (PRN):             Vital Signs Last 24 Hrs  T(C): 36.4 (02 Jan 2022 10:19), Max: 36.5 (01 Jan 2022 16:28)  T(F): 97.5 (02 Jan 2022 10:19), Max: 97.7 (01 Jan 2022 16:28)  HR: 78 (02 Jan 2022 10:19) (76 - 88)  BP: 140/68 (02 Jan 2022 10:19) (118/74 - 170/79)  BP(mean): --  RR: 18 (02 Jan 2022 10:19) (18 - 20)  SpO2: 97% (02 Jan 2022 10:19) (92% - 97%)          01-01 @ 07:01  -  01-02 @ 07:00  --------------------------------------------------------  IN: 400 mL / OUT: 1050 mL / NET: -650 mL          LABS:                        14.1   10.93 )-----------( 296      ( 01 Jan 2022 07:43 )             44.1     01-02    x   |  x   |  x   ----------------------------<  x   x    |  x   |  0.70    Ca    8.4      01 Jan 2022 07:43    TPro  5.9<L>  /  Alb  2.7<L>  /  TBili  0.4  /  DBili  0.1  /  AST  11  /  ALT  7<L>  /  AlkPhos  72  01-02        CAPILLARY BLOOD GLUCOSE                  CULTURES:        Physical Examination:  Awake and alert  Normocephalic atraumatic  NECK: supple, normal range of motion, no use of accessory muscles  PULM: Clear to auscultation bilaterally, with occ rales  CVS: Regular rate and rhythm, no murmurs, rubs, or gallops  Abd:  soft, non tender  Extrem: No CCE

## 2022-01-02 NOTE — PROGRESS NOTE ADULT - ASSESSMENT
ASSESSMENT:    unvaccinated woman without significant past medical/surgical history admitted with COVID-19 infection complicated by acute hypoxic respiratory failure due to extensive pneumonia, bronchospasm and obesity related restrictive lung disease - CT chest reveals bilateral multifocal peripheral ground glass opacities c/w COVID related pneumonia - no evidence of pulmonary edema or pleural effusions especially in the setting of a non-elevated pro-BNP level - no evidence of a superimposed bacterial infection in the absence of an elevated procalcitonin level - suspect the new leukocytosis is due to high dose steroids rather than infection.  Repeat cta negative for PE but worsened infiltrates    PLAN/RECOMMENDATIONS:       oxygen supplementation to keep saturation greater than 92% - currently on an 8lpm nasal canula alternating with 50% vm, she refuses to wear it  her ferritin and d dimer have begun to decrease, no escalation of care or tocilizumab  observe of antibacterial antibiotics and diuretics  symbicort/spiriva  mucinex 1200mg 2 times daily  completed 5 days of remdesivir - renal and liver function are stable  decadron 6mg IV/po day #7/10 days - following finger stick glucoses and treating hyperglycemia - should also treat bronchospasm  diligent DVT prophylaxis - SQ lovenox 40mg 2 times daily  following inflammatory markers - ferritin - d-dimer - CRP -> have begun to decrease at this time  consider orthopedic evaluation for right shoulder dislocation  should receive the COVID vaccination 30 days from discharge    Pt is DNR       Meg Corrigan MD, [facility]  [phone]  Pulmonary Medicine

## 2022-01-02 NOTE — PROGRESS NOTE ADULT - ASSESSMENT
87 yo F with no reported PMHx, presents to the ED for generalized weakness for the past few days. Pt has also had subjective fevers and her daughter called 911. EMS found the pt to be hypoxic to low 80s on RA, placed on NRB. Here, pt improved to high 90s on 4L NC. She denies SOB, CP, dizziness, n/v/d, abd pain, leg swelling.   ptn is covid positive, admitted with acute hypoxic respiratory failure,   ID and Pulm on board,   chest ct neg for PNA on admission, and CTA chest neg for PE 12/30  DDimer is close to 900 on admission,  tushar on 12/30 with higher O2 demand, now improving   Symbicort and proventil HFA,   completed remdesivir, cont dexamethasone iv.   worsening oxygenation, requiring on and off 100% NRB on 12/30,   now remains stable on 8L O2NC, doesnt tolerate HF  neg dopplers for DVT,  on Lovenox 40 bid,   denies having pain in right shoulder, has chronic AC dislocation, mult healed rib fractures  this was d/w in detail  ptn's daughter kp x 30 min.   ptn is DNR

## 2022-01-03 LAB
ALBUMIN SERPL ELPH-MCNC: 2.6 G/DL — LOW (ref 3.3–5)
ALP SERPL-CCNC: 69 U/L — SIGNIFICANT CHANGE UP (ref 40–120)
ALT FLD-CCNC: 10 U/L — SIGNIFICANT CHANGE UP (ref 10–45)
AST SERPL-CCNC: 18 U/L — SIGNIFICANT CHANGE UP (ref 10–40)
BILIRUB DIRECT SERPL-MCNC: <0.1 MG/DL — SIGNIFICANT CHANGE UP (ref 0–0.3)
BILIRUB INDIRECT FLD-MCNC: >0.3 MG/DL — SIGNIFICANT CHANGE UP (ref 0.2–1)
BILIRUB SERPL-MCNC: 0.4 MG/DL — SIGNIFICANT CHANGE UP (ref 0.2–1.2)
CREAT SERPL-MCNC: 0.62 MG/DL — SIGNIFICANT CHANGE UP (ref 0.5–1.3)
PROT SERPL-MCNC: 6.1 G/DL — SIGNIFICANT CHANGE UP (ref 6–8.3)

## 2022-01-03 RX ADMIN — ENOXAPARIN SODIUM 40 MILLIGRAM(S): 100 INJECTION SUBCUTANEOUS at 17:29

## 2022-01-03 RX ADMIN — BUDESONIDE AND FORMOTEROL FUMARATE DIHYDRATE 2 PUFF(S): 160; 4.5 AEROSOL RESPIRATORY (INHALATION) at 17:30

## 2022-01-03 RX ADMIN — Medication 6 MILLIGRAM(S): at 05:12

## 2022-01-03 RX ADMIN — TIOTROPIUM BROMIDE 1 CAPSULE(S): 18 CAPSULE ORAL; RESPIRATORY (INHALATION) at 12:52

## 2022-01-03 RX ADMIN — ENOXAPARIN SODIUM 40 MILLIGRAM(S): 100 INJECTION SUBCUTANEOUS at 05:13

## 2022-01-03 RX ADMIN — Medication 1200 MILLIGRAM(S): at 17:40

## 2022-01-03 RX ADMIN — Medication 1200 MILLIGRAM(S): at 05:08

## 2022-01-03 RX ADMIN — BUDESONIDE AND FORMOTEROL FUMARATE DIHYDRATE 2 PUFF(S): 160; 4.5 AEROSOL RESPIRATORY (INHALATION) at 05:30

## 2022-01-03 RX ADMIN — AMLODIPINE BESYLATE 5 MILLIGRAM(S): 2.5 TABLET ORAL at 05:12

## 2022-01-03 NOTE — PROGRESS NOTE ADULT - ASSESSMENT
85 yo F with no reported PMHx, presents to the ED for generalized weakness for the past few days. Pt has also had subjective fevers and her daughter called 911. EMS found the pt to be hypoxic to low 80s on RA, placed on NRB. Here, pt improved to high 90s on 4L NC. She denies SOB, CP, dizziness, n/v/d, abd pain, leg swelling.   Pt has not seen a doctor in over 6 years but pt has followed cardiology in the past, Dr. Elliot Caballero.   Daughter Daxa [phone] (27 Dec 2021 18:25)    ER vitals:  98.8, P 108, /94.  On supplemental O2, requiring nasal cannula 6 to 8 L.      Covid (+):    - Pt requiring supplemental O2.  CT chest with b/l groundglass densities c/w covid pna.  Agree with remdesivir and dexamethasone.  Monitor daily LFTs and renal function while pt on remdesivir.    - Maintain oxygenation >90%.  Pt on 50% VM.    - Check inflammatory markers:  DD:  1109 <-- 4014 <-- 1014 <-- 861  Ferritin: 531 <-- 717 <-- 799  Crp: 133  Pct: 0.17    - LE dopplers neg for DVT and CTA chest neg PE.  D-dimer trending down.  Cont lovenox BID dosing.    - Monitor off abx, do not suspect superimposed bacterial infection.    - Pt's oxygenation remains stable on VM 50%.  Will hold off on tocilizumab.     Asuncion Mcdermotti  [phone]

## 2022-01-03 NOTE — PROGRESS NOTE ADULT - SUBJECTIVE AND OBJECTIVE BOX
[facility] - PROGRESS NOTE    COVID-19 related pneumonia; acute hypoxic respiratory failure; bronchospasm; obesity; lethargy    INTERVAL HISTORY: CTA - no pulmonary embolism -  increasing diffuse bilateral peripheral and peribronchial vascular groundglass opacities c/w worsening COVID pneumonia; weak and frail; very tired - does not want to be bothered; rapid and shallow breathing without hypoxemia on a 50% VM; occasional cough productive of scant sputum; resolved chest congestion and wheeze; no fevers, chills or sweats; no chest pain/pressure or palpitations; decent appetite    REVIEW OF SYSTEMS:  Constitutional: As per interval history  HEENT: Within normal limits  CV: As per interval history  Resp: As per interval history  GI: Within normal limits   : Within normal limits  Musculoskeletal: shoulder arthritis - dislocated right shoulder  Skin: Within normal limits  Neurological: tired  Psychiatric: Within normal limits  Endocrine: Within normal limits  Hematologic/Lymphatic: Within normal limits  Allergic/Immunologic: Within normal limits    MEDICATIONS:     Pulmonary "  budesonide 160 MICROgram(s)/formoterol 4.5 MICROgram(s) Inhaler 2 Puff(s) Inhalation two times a day  guaiFENesin ER 1200 milliGRAM(s) Oral every 12 hours  tiotropium 18 MICROgram(s) Capsule 1 Capsule(s) Inhalation daily    Anti-microbials:    Cardiovascular:  amLODIPine   Tablet 5 milliGRAM(s) Oral daily    Other:  dexAMETHasone  Injectable 6 milliGRAM(s) IV Push daily  enoxaparin Injectable 40 milliGRAM(s) SubCutaneous every 12 hours  influenza  Vaccine (HIGH DOSE) 0.7 milliLiter(s) IntraMuscular once    MEDICATIONS  (PRN):    OBJECTIVE:    I&O's Detail    02 Jan 2022 07:01  -  03 Jan 2022 07:00  --------------------------------------------------------  IN:  Total IN: 0 mL    OUT:    Voided (mL): 650 mL  Total OUT: 650 mL    Total NET: -650 mL    PHYSICAL EXAM:       ICU Vital Signs Last 24 Hrs  T(C): 36.5 (03 Jan 2022 16:17), Max: 36.5 (03 Jan 2022 16:17)  T(F): 97.7 (03 Jan 2022 16:17), Max: 97.7 (03 Jan 2022 16:17)  HR: 78 (03 Jan 2022 16:17) (63 - 78)  BP: 150/77 (03 Jan 2022 16:17) (150/77 - 153/87)  BP(mean): --  ABP: --  ABP(mean): --  RR: 18 (03 Jan 2022 16:17) (18 - 18)  SpO2: 95% (03 Jan 2022 16:17) (93% - 95%) on 50%VM     General: Tired. Not cooperative. No distress. Appears stated age. Obese	  HEENT: Atraumatic. Normocephalic. Anicteric. Normal oral mucosa. PERRL. EOMI.  Neck: Supple. Trachea midline. Thyroid without enlargement/tenderness/nodules. No carotid bruit. No JVD.	  Cardiovascular: Regular rate and rhythm. S1 S2 normal. II/VI systolic murmur.  Respiratory: Respirations unlabored. Diffuse rales. No wheeze. Kyphosis.  Abdomen: Soft. Non-tender. Non-distended. No organomegaly. No masses. Normal bowel sounds. Obese.  Extremities: Warm to touch. No clubbing or cyanosis. No pedal edema.  Pulses: 2+ peripheral pulses all extremities.	  Skin: Normal skin color. No rashes or lesions. No ecchymoses. No cyanosis. Warm to touch.  Lymph Nodes: Cervical, supraclavicular and axillary nodes normal  Neurological: Motor and sensory examination equal and normal. A and O x 3  Psychiatry: Appropriate mood and affect.    LABS:      CBC    WBC  10.93 <==, 11.90 <==, 11.63 <==, 7.52 <==, 9.02 <==    Hemoglobin  14.1 <<==, 14.3 <<==, 13.8 <<==, 12.4 <<==, 13.4 <<==    Hematocrit  44.1 <==, 44.7 <==, 43.7 <==, 39.8 <==, 41.5 <==    Platelets  296 <==, 293 <==, 251 <==, 199 <==, 190 <==      x   |  x   |  x   ----------------------------<  x     01-03  x    |  x   |  0.62      LYTES    sodium  140 <==, 140 <==, 141 <==, 139 <==, 137 <==    potassium   4.2 <==, 4.4 <==, 4.1 <==, 3.9 <==, 3.8 <==    chloride  103 <==, 101 <==, 103 <==, 103 <==, 101 <==    carbon dioxide  24 <==, 29 <==, 25 <==, 23 <==, 22 <==    =============================================================================================  RENAL FUNCTION:    Creatinine:   0.62  <<==, 0.70  <<==, 0.67  <<==, 0.73  <<==, 0.87  <<==, 0.85  <<==, 0.91  <<==    BUN:   25 <==, 27 <==, 29 <==, 22 <==, 20 <==    ============================================================================================    calcium   8.4 <==, 9.2 <==, 8.7 <==, 8.6 <==, 9.0 <==    ============================================================================================  LFTs    AST:   18 <== , 11 <== , 11 <== , 12 <== , 31 <== , 31 <== , 48 <==     ALT:  10  <== , 7  <== , 10  <== , 14  <== , 23  <== , 21  <== , 25  <==     AP:  69  <=, 72  <=, 64  <=, 65  <=, 60  <=, 59  <=, 66  <=    Bili:  0.4  <=, 0.4  <=, 0.4  <=, 0.4  <=, 0.4  <=, 0.3  <=, 0.5  <=    Venous Blood Gas:  12-27 @ 16:50  7.41/45/20/28/37.1  VBG Lactate: 2.2    Procalcitonin, Serum: 0.17 ng/mL (12-27 @ 16:51)    Serum Pro-Brain Natriuretic Peptide: 185 pg/mL (12-27 @ 19:30)  Serum Pro-Brain Natriuretic Peptide: 169 pg/mL (12-27 @ 16    D-Dimer Assay, Quantitative (12.31.21 @ 12:14)   D-Dimer Assay, Quantitative: 1109  D-Dimer Assay, Quantitative (12.29.21 @ 09:26)   D-Dimer Assay, Quantitative: 4014  D-Dimer Assay, Quantitative (12.27.21 @ 19:30)   D-Dimer Assay, Quantitative: 1014  D-Dimer Assay, Quantitative (12.27.21 @ 16:51)   D-Dimer Assay, Quantitative: 861    C-Reactive Protein, Serum (12.27.21 @ 16:51)   C-Reactive Protein, Serum: 133 mg/L     Ferritin, Serum in AM (12.31.21 @ 13:51)   Ferritin, Serum: 531 ng/mL   Ferritin, Serum in AM (12.29.21 @ 11:37)   Ferritin, Serum: 717 ng/mL     MICROBIOLOGY:     Respiratory Viral Panel with COVID-19 by MONTY (12.27.21 @ 16:49)   Rapid RVP Result: Detected   SARS-CoV-2: Detected:    RADIOLOGY:  [x ] Chest radiographs reviewed and interpreted by me    EXAM:  CT ANGIO CHEST PULM ART [facility]                          PROCEDURE DATE:  12/30/2021      INTERPRETATION:  CLINICAL INFORMATION: Hypoxia and elevated d-dimer.   Admitted with Covid 19 infection.    FINDINGS:    LUNGS AND AIRWAYS: Trachea is deviated to the right. Tracheobronchial   secretions. Diffuse bilateral peripheral and peribronchial vascular   groundglass opacities, increased when compared with 12/27/2021.  PLEURA: No pleural effusion.  MEDIASTINUM AND JAMILAH: No lymphadenopathy.  VESSELS: No pulmonary embolism. Aortic and coronary artery calcifications.  HEART: Heart size is normal. No pericardial effusion.  CHEST WALL AND LOWER NECK: 1.1 cm left medial breast subcutaneous nodule,   probably a sebaceous cyst. Thyroid is unremarkable.  VISUALIZED UPPER ABDOMEN: Moderate hiatal hernia. 1.1 cm left adrenal   adenoma. 3.4 cm isodense round lesion adjacent to stomach (1, 108)  BONES: Chronic bilateral rib fractures. Inferior right shoulder   dislocation. 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   Multilevel degenerative changes of the thoracic spine. Dextroscoliotic   thoracic curvature.    IMPRESSION:  No pulmonary embolism.    Increased bilateral groundglass opacities, consistent with known Covid 19   infection.    3.4 cm isodense round lesion adjacent to stomach. 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 CT abdomen/pelvis with contrast recommended   for complete evaluation.    PAM LEIVA MD; Resident Radiology  This document has been electronically signed.  OPAL GUZMAN MD; Attending Radiologist  This document has been electronically signed. Dec [id number]  9:43AM  ---------------------------------------------------------------------------------------------------------------  < from: CT Chest No Cont (12.27.21 @ 18:58) >    EXAM:  CT CHEST                          PROCEDURE DATE:  12/27/2021      INTERPRETATION:  CLINICAL INFORMATION: Hypoxia    FINDINGS:    Lungs/Airways/Pleura: There are ill defined peripheral and   peribronchovascular predominant ground glass densities throughout both   lungs. The airways are patent. No pleural effusion.    Mediastinum/Lymph nodes: No thoracic adenopathy. There is a moderate   hiatal hernia.    Heart and Vessels: The cardiac chambers are normal in size. There are   mild coronary artery calcifications. No pericardial effusion. The aorta   is normal in caliber.    Upper Abdomen: Unremarkable.    Osseous structures and Soft Tissues: There are old/healing bilateral rib   fractures. No aggressive bone lesions. There is an inferior right   shoulder dislocation. There are bilateral glenohumeral joint effusions.    IMPRESSION:    1.  Lung findings typically seen in COVID infection. Other infections are   also in the differential    2.  Moderate hiatal hernia    3.  Inferior right shoulder dislocation    AMANDA MCGUIRE M.D., Attending Radiologist  This document has been electronically signed. Dec 28 2021  9:54AM  --------------------------------------------------------------------------------------------------------------  EXAM:  XR CHEST PORTABLE URGENT 1V                          PROCEDURE DATE:  12/27/2021      INTERPRETATION:  CLINICAL INFORMATION: Shortness of breath, cough and   fever    TECHNIQUE: AP view(s) of the chest.    COMPARISON: No comparison available.    FINDINGS:  The cardiac silhouette is normal in size. Bilateral lower lung patchy   airspace opacities. No pleural effusion or pneumothorax. No acute bony   abnormality.    IMPRESSION:  Bilateral lower lung patchy airspace opacities.    NICK DENT DO; Resident Radiologist  This document has been electronically signed.  CANDIDO GARCIA MD; Attending Radiologist  This document has been electronically signed. Dec 28 2021 12:06AM    ---------------------------------------------------------------------------------------------------------------  < from: Xray Shoulder 2 Views, Right (12.29.21 @ 17:30) >    EXAM:  XR SHOULDER COMP MIN 2V RT                          PROCEDURE DATE:  12/29/2021      INTERPRETATION:  CLINICAL INDICATION: radiographic correlation of   dislocated right shoulder detected on recent CT    EXAM:  AP and transscapular Y right shoulder from 12/29/2021 at 1730. Reviewed   in conjunction with chest CT from 12/27/2021.    IMPRESSION:  Redemonstrated anteriorly dislocated right shoulder, likely chronic. AC   joint alignment maintained.    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 Degenerative spurring apparent along   the uncovered glenoid and humeral head articular margins.    Multiple old upper right rib fracture deformities also apparent.    No acute appearing fractures.    Generalized osteopenia otherwise no discrete lytic or blastic lesions.    SANTHOSH NGUYEN MD; Attending Radiologist  This document has been electronically signed. Dec 29 2021  5:37PM    ---------------------------------------------------------------------------------------------------------------      RADIOLOGY:  [x] Chest radiographs reviewed and interpreted by me

## 2022-01-03 NOTE — PROGRESS NOTE ADULT - SUBJECTIVE AND OBJECTIVE BOX
Patient is a 86y old  Female who presents with a chief complaint of     SUBJECTIVE / OVERNIGHT EVENTS: on venti mask, no c/p    MEDICATIONS  (STANDING):  amLODIPine   Tablet 5 milliGRAM(s) Oral daily  budesonide 160 MICROgram(s)/formoterol 4.5 MICROgram(s) Inhaler 2 Puff(s) Inhalation two times a day  dexAMETHasone  Injectable 6 milliGRAM(s) IV Push daily  enoxaparin Injectable 40 milliGRAM(s) SubCutaneous every 12 hours  guaiFENesin ER 1200 milliGRAM(s) Oral every 12 hours  influenza  Vaccine (HIGH DOSE) 0.7 milliLiter(s) IntraMuscular once  tiotropium 18 MICROgram(s) Capsule 1 Capsule(s) Inhalation daily    MEDICATIONS  (PRN):      Vital Signs Last 24 Hrs  T(F): 97.6 (01-03-22 @ 08:59), Max: 97.8 (01-02-22 @ 15:50)  HR: 63 (01-03-22 @ 08:59) (63 - 76)  BP: 152/80 (01-03-22 @ 08:59) (121/63 - 153/87)  RR: 18 (01-03-22 @ 08:59) (18 - 18)  SpO2: 95% (01-03-22 @ 08:59) (93% - 98%)  Telemetry:   CAPILLARY BLOOD GLUCOSE        I&O's Summary    02 Jan 2022 07:01  -  03 Jan 2022 07:00  --------------------------------------------------------  IN: 0 mL / OUT: 650 mL / NET: -650 mL        PHYSICAL EXAM:  GENERAL: NAD, well-developed  HEAD:  Atraumatic, Normocephalic  EYES: EOMI, PERRLA, conjunctiva and sclera clear  NECK: Supple, No JVD  CHEST/LUNG: Clear to auscultation bilaterally; No wheeze  HEART: Regular rate and rhythm; No murmurs, rubs, or gallops  ABDOMEN: Soft, Nontender, Nondistended; Bowel sounds present  EXTREMITIES:  2+ Peripheral Pulses, No clubbing, cyanosis, or edema  PSYCH: AAOx3  NEUROLOGY: non-focal  SKIN: No rashes or lesions    LABS:    01-03    x   |  x   |  x   ----------------------------<  x   x    |  x   |  0.62      TPro  6.1  /  Alb  2.6<L>  /  TBili  0.4  /  DBili  <0.1  /  AST  18  /  ALT  10  /  AlkPhos  69  01-03              RADIOLOGY & ADDITIONAL TESTS:    Imaging Personally Reviewed:    Consultant(s) Notes Reviewed:      Care Discussed with Consultants/Other Providers:

## 2022-01-03 NOTE — PROGRESS NOTE ADULT - ASSESSMENT
ASSESSMENT:    unvaccinated woman without significant past medical/surgical history admitted with COVID-19 infection complicated by acute hypoxic respiratory failure due to extensive pneumonia, bronchospasm and obesity related restrictive lung disease - CT chest reveals bilateral multifocal peripheral ground glass opacities c/w COVID related pneumonia - no evidence of pulmonary edema or pleural effusions especially in the setting of a non-elevated pro-BNP level - no evidence of a superimposed bacterial infection in the absence of an elevated procalcitonin level - no evidence of pulmonary embolism - suspect the new leukocytosis is due to high dose steroids rather than infection    PLAN/RECOMMENDATIONS:    airborne and contact isolation  oxygen supplementation to keep saturation greater than 92% - currently on a 50% VM - refused a high flow nasal canula   observe off antibacterial antibiotics and diuretics  symbicort/spiriva  mucinex 1200mg 2 times daily  ID holding off with tocilizumab  has completed a 5 day course of remdesivir - renal and liver function are stable  decadron 6mg IV/po day # 8/10 - following finger stick glucoses and treating hyperglycemia - should also treat bronchospasm  diligent DVT prophylaxis - SQ lovenox 40mg 2 times daily  following inflammatory markers - ferritin (moderately elevated) - d-dimer (moderately elevated  but decreasing) - CRP (moderately elevated) -> will repeat in the AM  not a candidate for monoclonal antibodies on oxygen in the inpatient setting  consider orthopedic evaluation for right shoulder dislocation  should receive the COVID vaccination 30 days from discharge    Will follow with you. Plan of care discussed with the patient at bedside and with Dr. Owens. Patient is DNR.    Smith Robb MD, [facility]  [phone]  Pulmonary Medicine

## 2022-01-03 NOTE — PROGRESS NOTE ADULT - SUBJECTIVE AND OBJECTIVE BOX
Infectious Diseases progress note:    Subjective:  Events noted.  Pt states she feels very tired and just wants to rest.  Pt on 50% VM, with O2 sats 93 to 98%    ROS:  CONSTITUTIONAL:  c/o fatigue  CARDIOVASCULAR:  No chest pain or palpitations  RESPIRATORY:   No SOB, cough, dyspnea on exertion.  No wheezing  GASTROINTESTINAL:  No abd pain, N/V, diarrhea/constipation  EXTREMITIES:  No swelling or joint pain  GENITOURINARY:  No burning on urination, increased frequency or urgency.  No flank pain  NEUROLOGIC:  No HA, visual disturbances  SKIN: No rashes    Allergies    No Known Allergies    Intolerances        ANTIBIOTICS/RELEVANT:  antimicrobials    immunologic:  influenza  Vaccine (HIGH DOSE) 0.7 milliLiter(s) IntraMuscular once    OTHER:  amLODIPine   Tablet 5 milliGRAM(s) Oral daily  budesonide 160 MICROgram(s)/formoterol 4.5 MICROgram(s) Inhaler 2 Puff(s) Inhalation two times a day  dexAMETHasone  Injectable 6 milliGRAM(s) IV Push daily  enoxaparin Injectable 40 milliGRAM(s) SubCutaneous every 12 hours  guaiFENesin ER 1200 milliGRAM(s) Oral every 12 hours  tiotropium 18 MICROgram(s) Capsule 1 Capsule(s) Inhalation daily      Objective:  Vital Signs Last 24 Hrs  T(C): 36.5 (03 Jan 2022 16:17), Max: 36.5 (03 Jan 2022 16:17)  T(F): 97.7 (03 Jan 2022 16:17), Max: 97.7 (03 Jan 2022 16:17)  HR: 78 (03 Jan 2022 16:17) (63 - 78)  BP: 150/77 (03 Jan 2022 16:17) (150/77 - 153/87)  BP(mean): --  RR: 18 (03 Jan 2022 16:17) (18 - 18)  SpO2: 95% (03 Jan 2022 16:17) (93% - 95%)    PHYSICAL EXAM:  Constitutional:NAD, on VM  Eyes:ALEXEY, EOMI  Ear/Nose/Throat: no thrush, mucositis.  Moist mucous membranes	  Neck:no JVD, no lymphadenopathy, supple  Respiratory: CTA ole  Cardiovascular: S1S2 RRR, no murmurs  Gastrointestinal:soft, nontender,  nondistended (+) BS  Extremities:no e/e/c  Skin:  no rashes, open wounds or ulcerations        LABS:    01-03    x   |  x   |  x   ----------------------------<  x   x    |  x   |  0.62      TPro  6.1  /  Alb  2.6<L>  /  TBili  0.4  /  DBili  <0.1  /  AST  18  /  ALT  10  /  AlkPhos  69  01-03          Procalcitonin, Serum: 0.17 (12-27 @ 16:51)            Rapid RVP Result: Detected          MICROBIOLOGY:          RADIOLOGY & ADDITIONAL STUDIES:    < from: CT Angio Chest PE Protocol w/ IV Cont (12.30.21 @ 22:46) >    IMPRESSION:  No pulmonary embolism.    Increased bilateral groundglass opacities, consistent with known Covid 19   infection.    3.4 cm isodense round lesion adjacent to stomach. This is indeterminate   and may represent just tumor. CT abdomen/pelvis with contrast recommended   for complete evaluation.    < end of copied text >      < from: Xray Chest 1 View- PORTABLE-Urgent (Xray Chest 1 View- PORTABLE-Urgent .) (12.30.21 @ 09:41) >  FINDINGS:  Increase in diffuse bilateral patchy opacities. No pleural effusion or   pneumothorax.  The heart is normal in size.  No acute osseous findings.    IMPRESSION:  Increase in diffuse bilateral patchy opacities consistent with worsening   Covid pneumonia.    < end of copied text >

## 2022-01-04 LAB
ALBUMIN SERPL ELPH-MCNC: 2.7 G/DL — LOW (ref 3.3–5)
ALBUMIN SERPL ELPH-MCNC: 2.8 G/DL — LOW (ref 3.3–5)
ALP SERPL-CCNC: 65 U/L — SIGNIFICANT CHANGE UP (ref 40–120)
ALP SERPL-CCNC: 66 U/L — SIGNIFICANT CHANGE UP (ref 40–120)
ALT FLD-CCNC: 10 U/L — SIGNIFICANT CHANGE UP (ref 10–45)
ALT FLD-CCNC: 10 U/L — SIGNIFICANT CHANGE UP (ref 10–45)
ANION GAP SERPL CALC-SCNC: 9 MMOL/L — SIGNIFICANT CHANGE UP (ref 5–17)
AST SERPL-CCNC: 10 U/L — SIGNIFICANT CHANGE UP (ref 10–40)
AST SERPL-CCNC: 11 U/L — SIGNIFICANT CHANGE UP (ref 10–40)
BILIRUB DIRECT SERPL-MCNC: 0.1 MG/DL — SIGNIFICANT CHANGE UP (ref 0–0.3)
BILIRUB INDIRECT FLD-MCNC: 0.3 MG/DL — SIGNIFICANT CHANGE UP (ref 0.2–1)
BILIRUB SERPL-MCNC: 0.4 MG/DL — SIGNIFICANT CHANGE UP (ref 0.2–1.2)
BILIRUB SERPL-MCNC: 0.4 MG/DL — SIGNIFICANT CHANGE UP (ref 0.2–1.2)
BUN SERPL-MCNC: 27 MG/DL — HIGH (ref 7–23)
CALCIUM SERPL-MCNC: 8.6 MG/DL — SIGNIFICANT CHANGE UP (ref 8.4–10.5)
CHLORIDE SERPL-SCNC: 102 MMOL/L — SIGNIFICANT CHANGE UP (ref 96–108)
CO2 SERPL-SCNC: 27 MMOL/L — SIGNIFICANT CHANGE UP (ref 22–31)
CREAT SERPL-MCNC: 0.68 MG/DL — SIGNIFICANT CHANGE UP (ref 0.5–1.3)
CREAT SERPL-MCNC: 0.7 MG/DL — SIGNIFICANT CHANGE UP (ref 0.5–1.3)
CRP SERPL-MCNC: 11 MG/L — HIGH (ref 0–4)
D DIMER BLD IA.RAPID-MCNC: 1575 NG/ML DDU — HIGH
FERRITIN SERPL-MCNC: 569 NG/ML — HIGH (ref 15–150)
GLUCOSE SERPL-MCNC: 135 MG/DL — HIGH (ref 70–99)
POTASSIUM SERPL-MCNC: 4.6 MMOL/L — SIGNIFICANT CHANGE UP (ref 3.5–5.3)
POTASSIUM SERPL-SCNC: 4.6 MMOL/L — SIGNIFICANT CHANGE UP (ref 3.5–5.3)
PROT SERPL-MCNC: 5.8 G/DL — LOW (ref 6–8.3)
PROT SERPL-MCNC: 5.9 G/DL — LOW (ref 6–8.3)
SODIUM SERPL-SCNC: 138 MMOL/L — SIGNIFICANT CHANGE UP (ref 135–145)

## 2022-01-04 PROCEDURE — 71045 X-RAY EXAM CHEST 1 VIEW: CPT | Mod: 26

## 2022-01-04 RX ADMIN — Medication 1200 MILLIGRAM(S): at 06:08

## 2022-01-04 RX ADMIN — AMLODIPINE BESYLATE 5 MILLIGRAM(S): 2.5 TABLET ORAL at 06:08

## 2022-01-04 RX ADMIN — Medication 6 MILLIGRAM(S): at 06:08

## 2022-01-04 RX ADMIN — ENOXAPARIN SODIUM 40 MILLIGRAM(S): 100 INJECTION SUBCUTANEOUS at 06:08

## 2022-01-04 RX ADMIN — Medication 1200 MILLIGRAM(S): at 17:41

## 2022-01-04 RX ADMIN — ENOXAPARIN SODIUM 40 MILLIGRAM(S): 100 INJECTION SUBCUTANEOUS at 17:41

## 2022-01-04 RX ADMIN — BUDESONIDE AND FORMOTEROL FUMARATE DIHYDRATE 2 PUFF(S): 160; 4.5 AEROSOL RESPIRATORY (INHALATION) at 06:08

## 2022-01-04 RX ADMIN — BUDESONIDE AND FORMOTEROL FUMARATE DIHYDRATE 2 PUFF(S): 160; 4.5 AEROSOL RESPIRATORY (INHALATION) at 17:41

## 2022-01-04 NOTE — PROGRESS NOTE ADULT - ASSESSMENT
87 yo F with no reported PMHx, presents to the ED for generalized weakness for the past few days. Pt has also had subjective fevers and her daughter called 911. EMS found the pt to be hypoxic to low 80s on RA, placed on NRB. Here, pt improved to high 90s on 4L NC. She denies SOB, CP, dizziness, n/v/d, abd pain, leg swelling.   Pt has not seen a doctor in over 6 years but pt has followed cardiology in the past, Dr. Elliot Caballero.   Daughter Daxa [phone] (27 Dec 2021 18:25)    ER vitals:  98.8, P 108, /94.  On supplemental O2, requiring nasal cannula 6 to 8 L.      Covid (+):    - Pt requiring supplemental O2.  CT chest with b/l groundglass densities c/w covid pna.  Agree with remdesivir and dexamethasone.  Monitor daily LFTs and renal function while pt on remdesivir.    - Maintain oxygenation >90%.  Pt on 50% VM.    - Check inflammatory markers:  DD:  1109 <-- 4014 <-- 1014 <-- 861  Ferritin: 531 <-- 717 <-- 799  Crp: 133  Pct: 0.17    - LE dopplers neg for DVT and CTA chest neg PE.  D-dimer trending down.  Cont lovenox BID dosing.    - Monitor off abx, do not suspect superimposed bacterial infection.    - Pt's oxygenation remains stable on VM 50%.  No indication for tocilizumab at this time    d/w Medical attending.     Asuncion Nice  [phone]

## 2022-01-04 NOTE — PROGRESS NOTE ADULT - ASSESSMENT
87 yo F with no reported PMHx, presents to the ED for generalized weakness for the past few days. Pt has also had subjective fevers and her daughter called 911. EMS found the pt to be hypoxic to low 80s on RA, placed on NRB. Here, pt improved to high 90s on 4L NC. She denies SOB, CP, dizziness, n/v/d, abd pain, leg swelling.   ptn is covid positive, admitted with acute hypoxic respiratory failure,   ID and Pulm on board,   chest ct neg for PNA on admission, and CTA chest neg for PE 12/30  DDimer is close to 900 on admission,  tushar on 12/30 with higher O2 demand, now improving   Symbicort and proventil HFA,   completed remdesivir, cont dexamethasone iv.   worsening oxygenation, requiring on and off 100% NRB on 12/30,   now remains stable on venti mask, didnt  tolerate HF  neg dopplers for DVT,  on Lovenox 40 bid,   denies having pain in right shoulder, has chronic AC dislocation, mult healed rib fractures  this was d/w in detail  ptn's daughter kp x 30 min.   ptn is DNR  I will be covered by Dr. Hamm [employer] 1/5-1/10/22

## 2022-01-04 NOTE — PROGRESS NOTE ADULT - SUBJECTIVE AND OBJECTIVE BOX
Patient is a 86y old  Female who presents with a chief complaint of     SUBJECTIVE / OVERNIGHT EVENTS: no new c/o, ongoing hypoxia, lethargy, on Venti mask, spoke w daughter Lalita    MEDICATIONS  (STANDING):  amLODIPine   Tablet 5 milliGRAM(s) Oral daily  budesonide 160 MICROgram(s)/formoterol 4.5 MICROgram(s) Inhaler 2 Puff(s) Inhalation two times a day  dexAMETHasone  Injectable 6 milliGRAM(s) IV Push daily  enoxaparin Injectable 40 milliGRAM(s) SubCutaneous every 12 hours  guaiFENesin ER 1200 milliGRAM(s) Oral every 12 hours  influenza  Vaccine (HIGH DOSE) 0.7 milliLiter(s) IntraMuscular once  tiotropium 18 MICROgram(s) Capsule 1 Capsule(s) Inhalation daily    MEDICATIONS  (PRN):      Vital Signs Last 24 Hrs  T(F): 97.7 (01-04-22 @ 00:00), Max: 97.8 (01-03-22 @ 20:46)  HR: 72 (01-04-22 @ 00:00) (63 - 78)  BP: 149/82 (01-04-22 @ 00:00) (142/81 - 152/80)  RR: 18 (01-04-22 @ 00:00) (18 - 18)  SpO2: 96% (01-04-22 @ 00:00) (94% - 96%)  Telemetry:   CAPILLARY BLOOD GLUCOSE        I&O's Summary    02 Jan 2022 07:01  -  03 Jan 2022 07:00  --------------------------------------------------------  IN: 0 mL / OUT: 650 mL / NET: -650 mL    03 Jan 2022 07:01  -  04 Jan 2022 05:55  --------------------------------------------------------  IN: 240 mL / OUT: 600 mL / NET: -360 mL        PHYSICAL EXAM:  GENERAL: NAD, well-developed  HEAD:  Atraumatic, Normocephalic  EYES: EOMI, PERRLA, conjunctiva and sclera clear  NECK: Supple, No JVD  CHEST/LUNG: Clear to auscultation bilaterally; No wheeze  HEART: Regular rate and rhythm; No murmurs, rubs, or gallops  ABDOMEN: Soft, Nontender, Nondistended; Bowel sounds present  EXTREMITIES:  2+ Peripheral Pulses, No clubbing, cyanosis, or edema  PSYCH: AAOx3  NEUROLOGY: non-focal  SKIN: No rashes or lesions    LABS:    01-03    x   |  x   |  x   ----------------------------<  x   x    |  x   |  0.62      TPro  6.1  /  Alb  2.6<L>  /  TBili  0.4  /  DBili  <0.1  /  AST  18  /  ALT  10  /  AlkPhos  69  01-03              RADIOLOGY & ADDITIONAL TESTS:    Imaging Personally Reviewed:    Consultant(s) Notes Reviewed:      Care Discussed with Consultants/Other Providers:

## 2022-01-04 NOTE — PROGRESS NOTE ADULT - SUBJECTIVE AND OBJECTIVE BOX
Infectious Diseases progress note:    Subjective: Pt feels tired, wants to sleep.  Remains on VM.      ROS:  CONSTITUTIONAL:  No fever, chills, rigors  CARDIOVASCULAR:  No chest pain or palpitations  RESPIRATORY:   No SOB, cough, dyspnea on exertion.  No wheezing  GASTROINTESTINAL:  No abd pain, N/V, diarrhea/constipation  EXTREMITIES:  No swelling or joint pain  GENITOURINARY:  No burning on urination, increased frequency or urgency.  No flank pain  NEUROLOGIC:  No HA, visual disturbances  SKIN: No rashes    Allergies    No Known Allergies    Intolerances        ANTIBIOTICS/RELEVANT:  antimicrobials    immunologic:  influenza  Vaccine (HIGH DOSE) 0.7 milliLiter(s) IntraMuscular once    OTHER:  amLODIPine   Tablet 5 milliGRAM(s) Oral daily  budesonide 160 MICROgram(s)/formoterol 4.5 MICROgram(s) Inhaler 2 Puff(s) Inhalation two times a day  dexAMETHasone  Injectable 6 milliGRAM(s) IV Push daily  enoxaparin Injectable 40 milliGRAM(s) SubCutaneous every 12 hours  guaiFENesin ER 1200 milliGRAM(s) Oral every 12 hours  tiotropium 18 MICROgram(s) Capsule 1 Capsule(s) Inhalation daily      Objective:  Vital Signs Last 24 Hrs  T(C): 36.7 (04 Jan 2022 15:52), Max: 36.7 (04 Jan 2022 15:52)  T(F): 98 (04 Jan 2022 15:52), Max: 98 (04 Jan 2022 15:52)  HR: 66 (04 Jan 2022 15:52) (60 - 72)  BP: 147/70 (04 Jan 2022 15:52) (142/81 - 159/92)  BP(mean): --  RR: 18 (04 Jan 2022 15:52) (18 - 18)  SpO2: 94% (04 Jan 2022 15:52) (94% - 96%)    PHYSICAL EXAM:  Constitutional:NAD  Eyes:ALEXEY, EOMI  Ear/Nose/Throat: no thrush, mucositis.  Moist mucous membranes	  Neck:no JVD, no lymphadenopathy, supple  Respiratory: CTA ole  Cardiovascular: S1S2 RRR, no murmurs  Gastrointestinal:soft, nontender,  nondistended (+) BS  Extremities:no e/e/c  Skin:  no rashes, open wounds or ulcerations        LABS:    01-04    138  |  102  |  27<H>  ----------------------------<  135<H>  4.6   |  27  |  0.68    Ca    8.6      04 Jan 2022 09:24    TPro  5.9<L>  /  Alb  2.8<L>  /  TBili  0.4  /  DBili  0.1  /  AST  10  /  ALT  10  /  AlkPhos  66  01-04          Procalcitonin, Serum: 0.17 (12-27 @ 16:51)            Rapid RVP Result: Detected          MICROBIOLOGY:          RADIOLOGY & ADDITIONAL STUDIES:    < from: Xray Chest 1 View- PORTABLE-Urgent (Xray Chest 1 View- PORTABLE-Urgent .) (01.04.22 @ 10:26) >  FINDINGS:    There are unchanged patchy opacities predominantly in the bilateral   mid/lower lungs.    No pleural effusion.    Heart size is without change.    Severe degenerative changes of the bilateral glenohumeral joints with   dislocated right humeral head. Rib deformities are again identified.      IMPRESSION:    Unchanged patchy opacities as above.    < end of copied text >

## 2022-01-04 NOTE — PROGRESS NOTE ADULT - SUBJECTIVE AND OBJECTIVE BOX
[facility] - PROGRESS NOTE    COVID-19 related pneumonia; acute hypoxic respiratory failure; bronchospasm; obesity; lethargy    INTERVAL HISTORY: CTA - no pulmonary embolism -  increasing diffuse bilateral peripheral and peribronchial vascular groundglass opacities c/w worsening COVID pneumonia; weak and frail; very tired - does not want to be bothered; no shortness of breath or hypoxemia on a 50% VM; occasional cough productive of scant sputum; resolved chest congestion and wheeze; no fevers, chills or sweats; no chest pain/pressure or palpitations; decent appetite    REVIEW OF SYSTEMS:  Constitutional: As per interval history  HEENT: Within normal limits  CV: As per interval history  Resp: As per interval history  GI: Within normal limits   : Within normal limits  Musculoskeletal: shoulder arthritis - dislocated right shoulder  Skin: Within normal limits  Neurological: tired  Psychiatric: Within normal limits  Endocrine: Within normal limits  Hematologic/Lymphatic: Within normal limits  Allergic/Immunologic: Within normal limits    MEDICATIONS:     Pulmonary "  budesonide 160 MICROgram(s)/formoterol 4.5 MICROgram(s) Inhaler 2 Puff(s) Inhalation two times a day  guaiFENesin ER 1200 milliGRAM(s) Oral every 12 hours  tiotropium 18 MICROgram(s) Capsule 1 Capsule(s) Inhalation daily    Anti-microbials:    Cardiovascular:  amLODIPine   Tablet 5 milliGRAM(s) Oral daily    Other:  dexAMETHasone  Injectable 6 milliGRAM(s) IV Push daily  enoxaparin Injectable 40 milliGRAM(s) SubCutaneous every 12 hours  influenza  Vaccine (HIGH DOSE) 0.7 milliLiter(s) IntraMuscular once    MEDICATIONS  (PRN):    OBJECTIVE:    I&O's Detail    03 Jan 2022 07:01  -  04 Jan 2022 07:00  --------------------------------------------------------  IN:    Oral Fluid: 240 mL  Total IN: 240 mL    OUT:    Voided (mL): 700 mL  Total OUT: 700 mL    Total NET: -460 mL    PHYSICAL EXAM:       ICU Vital Signs Last 24 Hrs  T(C): 36.5 (04 Jan 2022 06:04), Max: 36.6 (03 Jan 2022 20:46)  T(F): 97.7 (04 Jan 2022 06:04), Max: 97.8 (03 Jan 2022 20:46)  HR: 61 (04 Jan 2022 06:04) (61 - 78)  BP: 159/92 (04 Jan 2022 06:04) (142/81 - 159/92)  BP(mean): --  ABP: --  ABP(mean): --  RR: 18 (04 Jan 2022 06:04) (18 - 18)  SpO2: 94% (04 Jan 2022 06:04) (94% - 96%) on 50% VM     General: Tired. Not cooperative. No distress. Appears stated age. Obese	  HEENT: Atraumatic. Normocephalic. Anicteric. Normal oral mucosa. PERRL. EOMI.  Neck: Supple. Trachea midline. Thyroid without enlargement/tenderness/nodules. No carotid bruit. No JVD.	  Cardiovascular: Regular rate and rhythm. S1 S2 normal. II/VI systolic murmur.  Respiratory: Respirations unlabored. Diffuse rales. No wheeze. Kyphosis.  Abdomen: Soft. Non-tender. Non-distended. No organomegaly. No masses. Normal bowel sounds. Obese.  Extremities: Warm to touch. No clubbing or cyanosis. No pedal edema.  Pulses: 2+ peripheral pulses all extremities.	  Skin: Normal skin color. No rashes or lesions. No ecchymoses. No cyanosis. Warm to touch.  Lymph Nodes: Cervical, supraclavicular and axillary nodes normal  Neurological: Motor and sensory examination equal and normal. A and O x 3  Psychiatry: Appropriate mood and affect.    LABS:      CBC    WBC  10.93 <==, 11.90 <==, 11.63 <==    Hemoglobin  14.1 <<==, 14.3 <<==, 13.8 <<==    Hematocrit  44.1 <==, 44.7 <==, 43.7 <==    Platelets  296 <==, 293 <==, 251 <==      x   |  x   |  x   ----------------------------<  x     01-03  x    |  x   |  0.62      LYTES    sodium  140 <==, 140 <==, 141 <==    potassium   4.2 <==, 4.4 <==, 4.1 <==    chloride  103 <==, 101 <==, 103 <==    carbon dioxide  24 <==, 29 <==, 25 <==    =============================================================================================  RENAL FUNCTION:    Creatinine:   0.62  <<==, 0.70  <<==, 0.67  <<==, 0.73  <<==, 0.87  <<==, 0.85  <<==    BUN:   25 <==, 27 <==, 29 <==    ============================================================================================    calcium   8.4 <==, 9.2 <==, 8.7 <==    ============================================================================================  LFTs    AST:   18 <== , 11 <== , 11 <== , 12 <== , 31 <==     ALT:  10  <== , 7  <== , 10  <== , 14  <== , 23  <==     AP:  69  <=, 72  <=, 64  <=, 65  <=, 60  <=    Bili:  0.4  <=, 0.4  <=, 0.4  <=, 0.4  <=, 0.4  <=    Venous Blood Gas:  12-27 @ 16:50  7.41/45/20/28/37.1  VBG Lactate: 2.2    Procalcitonin, Serum: 0.17 ng/mL (12-27 @ 16:51)    Serum Pro-Brain Natriuretic Peptide: 185 pg/mL (12-27 @ 19:30)  Serum Pro-Brain Natriuretic Peptide: 169 pg/mL (12-27 @ 16    D-Dimer Assay, Quantitative (12.31.21 @ 12:14)   D-Dimer Assay, Quantitative: 1109  D-Dimer Assay, Quantitative (12.29.21 @ 09:26)   D-Dimer Assay, Quantitative: 4014  D-Dimer Assay, Quantitative (12.27.21 @ 19:30)   D-Dimer Assay, Quantitative: 1014  D-Dimer Assay, Quantitative (12.27.21 @ 16:51)   D-Dimer Assay, Quantitative: 861    C-Reactive Protein, Serum (12.27.21 @ 16:51)   C-Reactive Protein, Serum: 133 mg/L     Ferritin, Serum in AM (12.31.21 @ 13:51)   Ferritin, Serum: 531 ng/mL   Ferritin, Serum in AM (12.29.21 @ 11:37)   Ferritin, Serum: 717 ng/mL     MICROBIOLOGY:     Respiratory Viral Panel with COVID-19 by MONTY (12.27.21 @ 16:49)   Rapid RVP Result: Detected   SARS-CoV-2: Detected:    RADIOLOGY:  [x ] Chest radiographs reviewed and interpreted by me    [facility]                          PROCEDURE DATE:  12/30/2021      INTERPRETATION:  CLINICAL INFORMATION: Hypoxia and elevated d-dimer.   Admitted with Covid 19 infection.    FINDINGS:    LUNGS AND AIRWAYS: Trachea is deviated to the right. Tracheobronchial   secretions. Diffuse bilateral peripheral and peribronchial vascular   groundglass opacities, increased when compared with 12/27/2021.  PLEURA: No pleural effusion.  MEDIASTINUM AND JAMILAH: No lymphadenopathy.  VESSELS: No pulmonary embolism. Aortic and coronary artery calcifications.  HEART: Heart size is normal. No pericardial effusion.  CHEST WALL AND LOWER NECK: 1.1 cm left medial breast subcutaneous nodule,   probably a sebaceous cyst. Thyroid is unremarkable.  VISUALIZED UPPER ABDOMEN: Moderate hiatal hernia. 1.1 cm left adrenal   adenoma. 3.4 cm isodense round lesion adjacent to stomach (1, 108)  BONES: Chronic bilateral rib fractures. Inferior right shoulder   dislocation. 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   Multilevel degenerative changes of the thoracic spine. Dextroscoliotic   thoracic curvature.    IMPRESSION:  No pulmonary embolism.    Increased bilateral groundglass opacities, consistent with known Covid 19   infection.    3.4 cm isodense round lesion adjacent to stomach. 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 CT abdomen/pelvis with contrast recommended   for complete evaluation.    PAM LEIVA MD; Resident Radiology  This document has been electronically signed.  OPAL GUZMAN MD; Attending Radiologist  This document has been electronically signed. Dec [id number]  9:43AM  ---------------------------------------------------------------------------------------------------------------  < from: CT Chest No Cont (12.27.21 @ 18:58) >    EXAM:  CT CHEST                          PROCEDURE DATE:  12/27/2021      INTERPRETATION:  CLINICAL INFORMATION: Hypoxia    FINDINGS:    Lungs/Airways/Pleura: There are ill defined peripheral and   peribronchovascular predominant ground glass densities throughout both   lungs. The airways are patent. No pleural effusion.    Mediastinum/Lymph nodes: No thoracic adenopathy. There is a moderate   hiatal hernia.    Heart and Vessels: The cardiac chambers are normal in size. There are   mild coronary artery calcifications. No pericardial effusion. The aorta   is normal in caliber.    Upper Abdomen: Unremarkable.    Osseous structures and Soft Tissues: There are old/healing bilateral rib   fractures. No aggressive bone lesions. There is an inferior right   shoulder dislocation. There are bilateral glenohumeral joint effusions.    IMPRESSION:    1.  Lung findings typically seen in COVID infection. Other infections are   also in the differential    2.  Moderate hiatal hernia    3.  Inferior right shoulder dislocation    AMANDA MCGUIRE M.D., Attending Radiologist  This document has been electronically signed. Dec 28 2021  9:54AM  --------------------------------------------------------------------------------------------------------------  EXAM:  XR CHEST PORTABLE URGENT 1V                          PROCEDURE DATE:  12/27/2021      INTERPRETATION:  CLINICAL INFORMATION: Shortness of breath, cough and   fever    TECHNIQUE: AP view(s) of the chest.    COMPARISON: No comparison available.    FINDINGS:  The cardiac silhouette is normal in size. Bilateral lower lung patchy   airspace opacities. No pleural effusion or pneumothorax. No acute bony   abnormality.    IMPRESSION:  Bilateral lower lung patchy airspace opacities.    NICK DENT DO; Resident Radiologist  This document has been electronically signed.  CANDIDO GARCIA MD; Attending Radiologist  This document has been electronically signed. Dec 28 2021 12:06AM    ---------------------------------------------------------------------------------------------------------------  < from: Xray Shoulder 2 Views, Right (12.29.21 @ 17:30) >    EXAM:  XR SHOULDER COMP MIN 2V RT                          PROCEDURE DATE:  12/29/2021      INTERPRETATION:  CLINICAL INDICATION: radiographic correlation of   dislocated right shoulder detected on recent CT    EXAM:  AP and transscapular Y right shoulder from 12/29/2021 at 1730. Reviewed   in conjunction with chest CT from 12/27/2021.    IMPRESSION:  Redemonstrated anteriorly dislocated right shoulder, likely chronic. AC   joint alignment maintained.    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 Degenerative spurring apparent along   the uncovered glenoid and humeral head articular margins.    Multiple old upper right rib fracture deformities also apparent.    No acute appearing fractures.    Generalized osteopenia otherwise no discrete lytic or blastic lesions.    SANTHOSH NGUYEN MD; Attending Radiologist  This document has been electronically signed. Dec 29 2021  5:37PM    ---------------------------------------------------------------------------------------------------------------

## 2022-01-04 NOTE — PROGRESS NOTE ADULT - ASSESSMENT
ASSESSMENT:    unvaccinated woman without significant past medical/surgical history admitted with COVID-19 infection complicated by acute hypoxic respiratory failure due to extensive pneumonia, bronchospasm and obesity related restrictive lung disease - CT chest reveals bilateral multifocal peripheral ground glass opacities c/w COVID related pneumonia - no evidence of pulmonary edema or pleural effusions especially in the setting of a non-elevated pro-BNP level - no evidence of a superimposed bacterial infection in the absence of an elevated procalcitonin level - no evidence of pulmonary embolism - suspect the new leukocytosis is due to high dose steroids rather than infection    PLAN/RECOMMENDATIONS:    airborne and contact isolation  follow-up CXR  oxygen supplementation to keep saturation greater than 92% - currently on a 50% VM - taper to a 40% VM today  observe off antibacterial antibiotics and diuretics  symbicort/spiriva  mucinex 1200mg 2 times daily  ID holding off with tocilizumab  has completed a 5 day course of remdesivir - renal and liver function are stable  decadron 6mg IV/po day # 8/10 - following finger stick glucoses and treating hyperglycemia - should also treat bronchospasm  diligent DVT prophylaxis - SQ lovenox 40mg 2 times daily  following inflammatory markers - ferritin (moderately elevated) - d-dimer (moderately elevated  but decreasing) - CRP (moderately elevated) -> will repeat in the AM  not a candidate for monoclonal antibodies on oxygen in the inpatient setting  consider orthopedic evaluation for right shoulder dislocation  should receive the COVID vaccination 30 days from discharge    Will follow with you. Plan of care discussed with the patient at bedside and with Dr. Owens. Patient is DNR.    Smith Robb MD, [facility]  [phone]  Pulmonary Medicine

## 2022-01-05 LAB
ALBUMIN SERPL ELPH-MCNC: 2.9 G/DL — LOW (ref 3.3–5)
ALP SERPL-CCNC: 70 U/L — SIGNIFICANT CHANGE UP (ref 40–120)
ALT FLD-CCNC: 18 U/L — SIGNIFICANT CHANGE UP (ref 10–45)
AST SERPL-CCNC: 21 U/L — SIGNIFICANT CHANGE UP (ref 10–40)
BILIRUB DIRECT SERPL-MCNC: 0.1 MG/DL — SIGNIFICANT CHANGE UP (ref 0–0.3)
BILIRUB INDIRECT FLD-MCNC: 0.2 MG/DL — SIGNIFICANT CHANGE UP (ref 0.2–1)
BILIRUB SERPL-MCNC: 0.3 MG/DL — SIGNIFICANT CHANGE UP (ref 0.2–1.2)
CREAT SERPL-MCNC: 0.74 MG/DL — SIGNIFICANT CHANGE UP (ref 0.5–1.3)
PROT SERPL-MCNC: 6 G/DL — SIGNIFICANT CHANGE UP (ref 6–8.3)

## 2022-01-05 RX ADMIN — Medication 1200 MILLIGRAM(S): at 19:29

## 2022-01-05 RX ADMIN — Medication 6 MILLIGRAM(S): at 05:45

## 2022-01-05 RX ADMIN — TIOTROPIUM BROMIDE 1 CAPSULE(S): 18 CAPSULE ORAL; RESPIRATORY (INHALATION) at 13:47

## 2022-01-05 RX ADMIN — ENOXAPARIN SODIUM 40 MILLIGRAM(S): 100 INJECTION SUBCUTANEOUS at 05:45

## 2022-01-05 RX ADMIN — BUDESONIDE AND FORMOTEROL FUMARATE DIHYDRATE 2 PUFF(S): 160; 4.5 AEROSOL RESPIRATORY (INHALATION) at 19:29

## 2022-01-05 RX ADMIN — BUDESONIDE AND FORMOTEROL FUMARATE DIHYDRATE 2 PUFF(S): 160; 4.5 AEROSOL RESPIRATORY (INHALATION) at 06:28

## 2022-01-05 RX ADMIN — AMLODIPINE BESYLATE 5 MILLIGRAM(S): 2.5 TABLET ORAL at 05:45

## 2022-01-05 RX ADMIN — Medication 1200 MILLIGRAM(S): at 05:45

## 2022-01-05 NOTE — PROGRESS NOTE ADULT - SUBJECTIVE AND OBJECTIVE BOX
[facility] - PROGRESS NOTE    COVID-19 related pneumonia; acute hypoxic respiratory failure; bronchospasm; obesity; lethargy    INTERVAL HISTORY: CTA - no pulmonary embolism -  increasing diffuse bilateral peripheral and peribronchial vascular groundglass opacities c/w worsening COVID pneumonia; more awake and alert - just ate her entire breakfast and demanding some oatmeal; no shortness of breath or hypoxemia on a 6lpm nasal canula; occasional cough productive of scant sputum; resolved chest congestion and wheeze; no fevers, chills or sweats; no chest pain/pressure or palpitations; has yet to be out of bed    REVIEW OF SYSTEMS:  Constitutional: As per interval history  HEENT: Within normal limits  CV: As per interval history  Resp: As per interval history  GI: Within normal limits   : Within normal limits  Musculoskeletal: shoulder arthritis - dislocated right shoulder  Skin: Within normal limits  Neurological: tired  Psychiatric: Within normal limits  Endocrine: Within normal limits  Hematologic/Lymphatic: Within normal limits  Allergic/Immunologic: Within normal limits    MEDICATIONS:     Pulmonary "  budesonide 160 MICROgram(s)/formoterol 4.5 MICROgram(s) Inhaler 2 Puff(s) Inhalation two times a day  guaiFENesin ER 1200 milliGRAM(s) Oral every 12 hours  tiotropium 18 MICROgram(s) Capsule 1 Capsule(s) Inhalation daily    Anti-microbials:    Cardiovascular:  amLODIPine   Tablet 5 milliGRAM(s) Oral daily    Other:  dexAMETHasone  Injectable 6 milliGRAM(s) IV Push daily  enoxaparin Injectable 40 milliGRAM(s) SubCutaneous every 12 hours  influenza  Vaccine (HIGH DOSE) 0.7 milliLiter(s) IntraMuscular once    MEDICATIONS  (PRN):    OBJECTIVE:    I&O's Detail    04 Jan 2022 07:01  -  05 Jan 2022 07:00  --------------------------------------------------------  IN:    Oral Fluid: 530 mL  Total IN: 530 mL    OUT:    Voided (mL): 900 mL  Total OUT: 900 mL    Total NET: -370 mL    PHYSICAL EXAM:       ICU Vital Signs Last 24 Hrs  T(C): 36.8 (05 Jan 2022 08:35), Max: 36.8 (05 Jan 2022 08:35)  T(F): 98.3 (05 Jan 2022 08:35), Max: 98.3 (05 Jan 2022 08:35)  HR: 56 (05 Jan 2022 08:35) (56 - 66)  BP: 145/76 (05 Jan 2022 08:35) (124/80 - 157/84)  BP(mean): --  ABP: --  ABP(mean): --  RR: 18 (05 Jan 2022 08:38) (18 - 18)  SpO2: 96% (05 Jan 2022 08:38) (93% - 96%) on 6lpm nasal canula     General: Awake. Alert. Cooperative. No distress. Appears stated age. Obese  HEENT: Atraumatic. Normocephalic. Anicteric. Normal oral mucosa. PERRL. EOMI.  Neck: Supple. Trachea midline. Thyroid without enlargement/tenderness/nodules. No carotid bruit. No JVD.	  Cardiovascular: Regular rate and rhythm. S1 S2 normal. II/VI systolic murmur.  Respiratory: Respirations unlabored. Decreasing rales. No wheeze. Kyphosis.  Abdomen: Soft. Non-tender. Non-distended. No organomegaly. No masses. Normal bowel sounds. Obese.  Extremities: Warm to touch. No clubbing or cyanosis. No pedal edema.  Pulses: 2+ peripheral pulses all extremities.	  Skin: Normal skin color. No rashes or lesions. No ecchymoses. No cyanosis. Warm to touch.  Lymph Nodes: Cervical, supraclavicular and axillary nodes normal  Neurological: Motor and sensory examination equal and normal. A and O x 3  Psychiatry: Appropriate mood and affect.    LABS:      CBC    WBC  10.93 <==, 11.90 <==    Hemoglobin  14.1 <<==, 14.3 <<==    Hematocrit  44.1 <==, 44.7 <==    Platelets  296 <==, 293 <==      138  |  102  |  27<H>  ----------------------------<  135<H>    01-04  4.6   |  27  |  0.68      LYTES    sodium  138 <==, 140 <==, 140 <==    potassium   4.6 <==, 4.2 <==, 4.4 <==    chloride  102 <==, 103 <==, 101 <==    carbon dioxide  27 <==, 24 <==, 29 <==    =============================================================================================  RENAL FUNCTION:    Creatinine:   0.68  <<==, 0.62  <<==, 0.70  <<==, 0.67  <<==, 0.73  <<==    BUN:   27 <==, 25 <==, 27 <==    ============================================================================================    calcium   8.6 <==, 8.4 <==, 9.2 <==    ============================================================================================  LFTs    AST:   10 <== , 18 <== , 11 <== , 11 <== , 12 <==     ALT:  10  <== , 10  <== , 7  <== , 10  <== , 14  <==     AP:  66  <=, 69  <=, 72  <=, 64  <=, 65  <=    Bili:  0.4  <=, 0.4  <=, 0.4  <=, 0.4  <=, 0.4  <=    Venous Blood Gas:  12-27 @ 16:50  7.41/45/20/28/37.1  VBG Lactate: 2.2    Procalcitonin, Serum: 0.17 ng/mL (12-27 @ 16:51)    Serum Pro-Brain Natriuretic Peptide: 185 pg/mL (12-27 @ 19:30)  Serum Pro-Brain Natriuretic Peptide: 169 pg/mL (12-27 @ 16    D-Dimer Assay, Quantitative (01.04.22 @ 09:24)   D-Dimer Assay, Quantitative: 1575  D-Dimer Assay, Quantitative (12.31.21 @ 12:14)   D-Dimer Assay, Quantitative: 1109  D-Dimer Assay, Quantitative (12.29.21 @ 09:26)   D-Dimer Assay, Quantitative: 4014  D-Dimer Assay, Quantitative (12.27.21 @ 19:30)   D-Dimer Assay, Quantitative: 1014  D-Dimer Assay, Quantitative (12.27.21 @ 16:51)   D-Dimer Assay, Quantitative: 861      C-Reactive Protein, Serum (01.04.22 @ 09:24)   C-Reactive Protein, Serum: 11 mg/L   C-Reactive Protein, Serum (12.27.21 @ 16:51)   C-Reactive Protein, Serum: 133 mg/L     Ferritin, Serum in AM (01.04.22 @ 16:09)   Ferritin, Serum: 569 ng/mL   Ferritin, Serum in AM (12.31.21 @ 13:51)   Ferritin, Serum: 531 ng/mL   Ferritin, Serum in AM (12.29.21 @ 11:37)   Ferritin, Serum: 717 ng/mL     MICROBIOLOGY:     Respiratory Viral Panel with COVID-19 by MONTY (12.27.21 @ 16:49)   Rapid RVP Result: Detected   SARS-CoV-2: Detected:    RADIOLOGY:  [x ] Chest radiographs reviewed and interpreted by me    < from: Xray Chest 1 View- PORTABLE-Urgent (Xray Chest 1 View- PORTABLE-Urgent .) (01.04.22 @ 10:26) >    EXAM:  XR CHEST PORTABLE URGENT 1V                          PROCEDURE DATE:  01/04/2022      FINDINGS:    There are unchanged patchy opacities predominantly in the bilateral   mid/lower lungs.    No pleural effusion.    Heart size is without change.    Severe degenerative changes of the bilateral glenohumeral joints with   dislocated right humeral head. Rib deformities are again identified.      IMPRESSION:    Unchanged patchy opacities as above.    FLAVIO ACOSTA MD; Resident Radiologist  This document has been electronically signed.  CANDIDO GARCIA MD; Attending Radiologist  This document has been electronically signed. Jan 4 2022  3:54PM  ---------------------------------------------------------------------------------------------------------------  EXAM:  [facility]                          PROCEDURE DATE:  12/30/2021      INTERPRETATION:  CLINICAL INFORMATION: Hypoxia and elevated d-dimer.   Admitted with Covid 19 infection.    FINDINGS:    LUNGS AND AIRWAYS: Trachea is deviated to the right. Tracheobronchial   secretions. Diffuse bilateral peripheral and peribronchial vascular   groundglass opacities, increased when compared with 12/27/2021.  PLEURA: No pleural effusion.  MEDIASTINUM AND JAMILAH: No lymphadenopathy.  VESSELS: No pulmonary embolism. Aortic and coronary artery calcifications.  HEART: Heart size is normal. No pericardial effusion.  CHEST WALL AND LOWER NECK: 1.1 cm left medial breast subcutaneous nodule,   probably a sebaceous cyst. Thyroid is unremarkable.  VISUALIZED UPPER ABDOMEN: Moderate hiatal hernia. 1.1 cm left adrenal   adenoma. 3.4 cm isodense round lesion adjacent to stomach (1, 108)  BONES: Chronic bilateral rib fractures. Inferior right shoulder   dislocation. 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   Multilevel degenerative changes of the thoracic spine. Dextroscoliotic   thoracic curvature.    IMPRESSION:  No pulmonary embolism.    Increased bilateral groundglass opacities, consistent with known Covid 19   infection.    3.4 cm isodense round lesion adjacent to stomach. 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 CT abdomen/pelvis with contrast recommended   for complete evaluation.    PAM LEIVA MD; Resident Radiology  This document has been electronically signed.  OPAL GUZMAN MD; Attending Radiologist  This document has been electronically signed. Dec 528802  9:43AM  ---------------------------------------------------------------------------------------------------------------  < from: CT Chest No Cont (12.27.21 @ 18:58) >    EXAM:  CT CHEST                          PROCEDURE DATE:  12/27/2021      INTERPRETATION:  CLINICAL INFORMATION: Hypoxia    FINDINGS:    Lungs/Airways/Pleura: There are ill defined peripheral and   peribronchovascular predominant ground glass densities throughout both   lungs. The airways are patent. No pleural effusion.    Mediastinum/Lymph nodes: No thoracic adenopathy. There is a moderate   hiatal hernia.    Heart and Vessels: The cardiac chambers are normal in size. There are   mild coronary artery calcifications. No pericardial effusion. The aorta   is normal in caliber.    Upper Abdomen: Unremarkable.    Osseous structures and Soft Tissues: There are old/healing bilateral rib   fractures. No aggressive bone lesions. There is an inferior right   shoulder dislocation. There are bilateral glenohumeral joint effusions.    IMPRESSION:    1.  Lung findings typically seen in COVID infection. Other infections are   also in the differential    2.  Moderate hiatal hernia    3.  Inferior right shoulder dislocation    AMANDA MCGUIRE M.D., Attending Radiologist  This document has been electronically signed. Dec 28 2021  9:54AM  --------------------------------------------------------------------------------------------------------------  EXAM:  XR CHEST PORTABLE URGENT 1V                          PROCEDURE DATE:  12/27/2021      INTERPRETATION:  CLINICAL INFORMATION: Shortness of breath, cough and   fever    TECHNIQUE: AP view(s) of the chest.    COMPARISON: No comparison available.    FINDINGS:  The cardiac silhouette is normal in size. Bilateral lower lung patchy   airspace opacities. No pleural effusion or pneumothorax. No acute bony   abnormality.    IMPRESSION:  Bilateral lower lung patchy airspace opacities.    NICK DENT DO; Resident Radiologist  This document has been electronically signed.  CANDIDO GARCIA MD; Attending Radiologist  This document has been electronically signed. Dec 28 2021 12:06AM    ---------------------------------------------------------------------------------------------------------------  < from: Xray Shoulder 2 Views, Right (12.29.21 @ 17:30) >    EXAM:  XR SHOULDER COMP MIN 2V RT                          PROCEDURE DATE:  12/29/2021      INTERPRETATION:  CLINICAL INDICATION: radiographic correlation of   dislocated right shoulder detected on recent CT    EXAM:  AP and transscapular Y right shoulder from 12/29/2021 at 1730. Reviewed   in conjunction with chest CT from 12/27/2021.    IMPRESSION:  Redemonstrated anteriorly dislocated right shoulder, likely chronic. AC   joint alignment maintained.    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 Degenerative spurring apparent along   the uncovered glenoid and humeral head articular margins.    Multiple old upper right rib fracture deformities also apparent.    No acute appearing fractures.    Generalized osteopenia otherwise no discrete lytic or blastic lesions.    SANTHOSH NGUYEN MD; Attending Radiologist  This document has been electronically signed. Dec 29 2021  5:37PM    ---------------------------------------------------------------------------------------------------------------

## 2022-01-05 NOTE — PROGRESS NOTE ADULT - ASSESSMENT
85 yo F with no reported PMHx, presents to the ED for generalized weakness for the past few days. Pt has also had subjective fevers and her daughter called 911. EMS found the pt to be hypoxic to low 80s on RA, placed on NRB. Here, pt improved to high 90s on 4L NC. She denies SOB, CP, dizziness, n/v/d, abd pain, leg swelling.    covid positive, admitted with acute hypoxic respiratory failure,   ID and Pulm f/u  chest ct neg for PNA on admission, and CTA chest neg for PE 12/30  pulm f/u noted  Symbicort and proventil HFA,   completed remdesivir, cont dexamethasone iv.   neg dopplers for DVT,   Lovenox 40 bid,   denies having pain in right shoulder, has chronic AC dislocation, mult healed rib fractures  this was d/w in detail  ptn's daughter kp by dr garrett  ptn is DNR  pt

## 2022-01-05 NOTE — CONSULT NOTE ADULT - SUBJECTIVE AND OBJECTIVE BOX
86yFemale with incidental finding of R proximal humerus dislocation on CT. Has no pain on movement of the shoulder. Denies any recent traumatic injury that she can recall. Patient denies numbness, tingling, or paresthesias.  Patient denies any other injuries. Patient is RHD.     PMH:  No pertinent past medical history      PSH:  No significant past surgical history      AH:    Meds: See med rec    T(C): 36.4 (01-05-22 @ 17:21)  HR: 71 (01-05-22 @ 17:21)  BP: 126/74 (01-05-22 @ 17:21)  RR: 18 (01-05-22 @ 17:21)  SpO2: 93% (01-05-22 @ 17:21)  Wt(kg): --    Gen: NAD    PE   RUE:  Skin intact,   SILT axillary/med/rad/ulnar  NTTP  No pain on PROM of the shoulder   +Motor AIN/PIN/Ulnar/Radial/Musc/Median,   Radial 2+  Compartments soft and compressible    Secondary:  LUE: No bony tenderness or deformity, skin intact  RLE: No bony tenderness or deformity, skin intact  LLE: No bony tenderness or deformity, skin intact  Spine: No tenderness or stepoffs, skin intact    Imaging:  XR R Shoulder: Demonstrating chronic shoulder dislocation with pseudo-acetabularization  of the joint        86yFemale with chronic R shoulder dislocation    Pt has painless ROM, has documented shoulder dislocation since at least 12/27, with evidence of pseudo-acetabularization of the joint indicative of a chronically dislocated shoulder joint - no intervention is warranted at this time. If patient needs intervention will be a reverse total shoulder replacement as an outpatient but she is fairly functional  Pain control  WBAT  PT/OT  No acute orthopaedic intervention  Ortho stable for DC  Please call if you have further question  Can follow up with Dr. Gallegos in 1-2 weeks or upon discharge

## 2022-01-05 NOTE — PROGRESS NOTE ADULT - ASSESSMENT
ASSESSMENT:    unvaccinated woman without significant past medical/surgical history admitted with COVID-19 infection complicated by acute hypoxic respiratory failure due to extensive pneumonia, bronchospasm and obesity related restrictive lung disease - CT chest reveals bilateral multifocal peripheral ground glass opacities c/w COVID related pneumonia - no evidence of pulmonary edema or pleural effusions especially in the setting of a non-elevated pro-BNP level - no evidence of a superimposed bacterial infection in the absence of an elevated procalcitonin level - no evidence of pulmonary embolism - suspect the new leukocytosis is due to high dose steroids rather than infection    PLAN/RECOMMENDATIONS:    airborne and contact isolation  follow-up CXR -> no change in patchy opacities predominantly in the bilateral   mid/lower lungs  oxygen supplementation to keep saturation greater than 92% - humidified nasal canula @ 6lpm  observe off antibacterial antibiotics and diuretics  symbicort/spiriva  mucinex 1200mg 2 times daily  ID holding off with tocilizumab  has completed a 5 day course of remdesivir - renal and liver function are stable  decadron 6mg IV/po day # 9/10 - following finger stick glucoses and treating hyperglycemia   diligent DVT prophylaxis - SQ lovenox 40mg 2 times daily  following inflammatory markers - ferritin (moderately elevated) - d-dimer (moderately elevated  but decreasing) - CRP (minimally elevated)  not a candidate for monoclonal antibodies on oxygen in the inpatient setting  consider orthopedic evaluation for right shoulder dislocation  should receive the COVID vaccination 30 days from discharge  out of bed and into the chair    Will follow with you. Plan of care discussed with the patient at bedside.. Patient is DNR.    Smith Robb MD, [facility]  [phone]  Pulmonary Medicine

## 2022-01-06 LAB
ALBUMIN SERPL ELPH-MCNC: 2.7 G/DL — LOW (ref 3.3–5)
ALP SERPL-CCNC: 72 U/L — SIGNIFICANT CHANGE UP (ref 40–120)
ALT FLD-CCNC: 18 U/L — SIGNIFICANT CHANGE UP (ref 10–45)
ANION GAP SERPL CALC-SCNC: 10 MMOL/L — SIGNIFICANT CHANGE UP (ref 5–17)
AST SERPL-CCNC: 16 U/L — SIGNIFICANT CHANGE UP (ref 10–40)
BILIRUB SERPL-MCNC: 0.3 MG/DL — SIGNIFICANT CHANGE UP (ref 0.2–1.2)
BUN SERPL-MCNC: 24 MG/DL — HIGH (ref 7–23)
CALCIUM SERPL-MCNC: 8.5 MG/DL — SIGNIFICANT CHANGE UP (ref 8.4–10.5)
CHLORIDE SERPL-SCNC: 100 MMOL/L — SIGNIFICANT CHANGE UP (ref 96–108)
CO2 SERPL-SCNC: 23 MMOL/L — SIGNIFICANT CHANGE UP (ref 22–31)
CREAT SERPL-MCNC: 0.75 MG/DL — SIGNIFICANT CHANGE UP (ref 0.5–1.3)
GLUCOSE SERPL-MCNC: 128 MG/DL — HIGH (ref 70–99)
HCT VFR BLD CALC: 46 % — HIGH (ref 34.5–45)
HGB BLD-MCNC: 14.5 G/DL — SIGNIFICANT CHANGE UP (ref 11.5–15.5)
MAGNESIUM SERPL-MCNC: 2 MG/DL — SIGNIFICANT CHANGE UP (ref 1.6–2.6)
MCHC RBC-ENTMCNC: 29.4 PG — SIGNIFICANT CHANGE UP (ref 27–34)
MCHC RBC-ENTMCNC: 31.5 GM/DL — LOW (ref 32–36)
MCV RBC AUTO: 93.1 FL — SIGNIFICANT CHANGE UP (ref 80–100)
NRBC # BLD: 0 /100 WBCS — SIGNIFICANT CHANGE UP (ref 0–0)
PLATELET # BLD AUTO: 238 K/UL — SIGNIFICANT CHANGE UP (ref 150–400)
POTASSIUM SERPL-MCNC: 4.6 MMOL/L — SIGNIFICANT CHANGE UP (ref 3.5–5.3)
POTASSIUM SERPL-SCNC: 4.6 MMOL/L — SIGNIFICANT CHANGE UP (ref 3.5–5.3)
PROT SERPL-MCNC: 5.8 G/DL — LOW (ref 6–8.3)
RBC # BLD: 4.94 M/UL — SIGNIFICANT CHANGE UP (ref 3.8–5.2)
RBC # FLD: 13.9 % — SIGNIFICANT CHANGE UP (ref 10.3–14.5)
SODIUM SERPL-SCNC: 133 MMOL/L — LOW (ref 135–145)
WBC # BLD: 15.12 K/UL — HIGH (ref 3.8–10.5)
WBC # FLD AUTO: 15.12 K/UL — HIGH (ref 3.8–10.5)

## 2022-01-06 RX ADMIN — BUDESONIDE AND FORMOTEROL FUMARATE DIHYDRATE 2 PUFF(S): 160; 4.5 AEROSOL RESPIRATORY (INHALATION) at 18:48

## 2022-01-06 RX ADMIN — ENOXAPARIN SODIUM 40 MILLIGRAM(S): 100 INJECTION SUBCUTANEOUS at 06:57

## 2022-01-06 RX ADMIN — TIOTROPIUM BROMIDE 1 CAPSULE(S): 18 CAPSULE ORAL; RESPIRATORY (INHALATION) at 12:06

## 2022-01-06 RX ADMIN — Medication 6 MILLIGRAM(S): at 06:57

## 2022-01-06 RX ADMIN — BUDESONIDE AND FORMOTEROL FUMARATE DIHYDRATE 2 PUFF(S): 160; 4.5 AEROSOL RESPIRATORY (INHALATION) at 07:00

## 2022-01-06 RX ADMIN — Medication 1200 MILLIGRAM(S): at 18:47

## 2022-01-06 RX ADMIN — Medication 1200 MILLIGRAM(S): at 06:57

## 2022-01-06 RX ADMIN — ENOXAPARIN SODIUM 40 MILLIGRAM(S): 100 INJECTION SUBCUTANEOUS at 18:47

## 2022-01-06 RX ADMIN — AMLODIPINE BESYLATE 5 MILLIGRAM(S): 2.5 TABLET ORAL at 06:57

## 2022-01-06 NOTE — PROGRESS NOTE ADULT - SUBJECTIVE AND OBJECTIVE BOX
Infectious Diseases progress note:    Subjective: Afebrile, NAD.  Cont to state she is tired and would like to rest.  Now weaned to NC 5L    ROS:  CONSTITUTIONAL:  No fever, chills, rigors  CARDIOVASCULAR:  No chest pain or palpitations  RESPIRATORY:   No SOB, cough, dyspnea on exertion.  No wheezing  GASTROINTESTINAL:  No abd pain, N/V, diarrhea/constipation  EXTREMITIES:  No swelling or joint pain  GENITOURINARY:  No burning on urination, increased frequency or urgency.  No flank pain  NEUROLOGIC:  No HA, visual disturbances  SKIN: No rashes    Allergies    No Known Allergies    Intolerances        ANTIBIOTICS/RELEVANT:  antimicrobials    immunologic:  influenza  Vaccine (HIGH DOSE) 0.7 milliLiter(s) IntraMuscular once    OTHER:  amLODIPine   Tablet 5 milliGRAM(s) Oral daily  budesonide 160 MICROgram(s)/formoterol 4.5 MICROgram(s) Inhaler 2 Puff(s) Inhalation two times a day  dexAMETHasone  Injectable 6 milliGRAM(s) IV Push daily  enoxaparin Injectable 40 milliGRAM(s) SubCutaneous every 12 hours  guaiFENesin ER 1200 milliGRAM(s) Oral every 12 hours  tiotropium 18 MICROgram(s) Capsule 1 Capsule(s) Inhalation daily      Objective:  Vital Signs Last 24 Hrs  T(C): 36.7 (06 Jan 2022 14:05), Max: 36.7 (06 Jan 2022 14:05)  T(F): 98.1 (06 Jan 2022 14:05), Max: 98.1 (06 Jan 2022 14:05)  HR: 83 (06 Jan 2022 14:05) (63 - 83)  BP: 129/84 (06 Jan 2022 14:05) (124/76 - 134/79)  BP(mean): --  RR: 20 (06 Jan 2022 14:05) (18 - 20)  SpO2: 95% (06 Jan 2022 14:05) (92% - 97%)    PHYSICAL EXAM:  Constitutional:NAD  Eyes:ALEXEY, EOMI  Ear/Nose/Throat: no thrush, mucositis.  Moist mucous membranes	  Neck:no JVD, no lymphadenopathy, supple  Respiratory: CTA ole  Cardiovascular: S1S2 RRR, no murmurs  Gastrointestinal:soft, nontender,  nondistended (+) BS  Extremities:no e/e/c  Skin:  no rashes, open wounds or ulcerations        LABS:                        14.5   15.12 )-----------( 238      ( 06 Jan 2022 09:32 )             46.0     01-06    133<L>  |  100  |  24<H>  ----------------------------<  128<H>  4.6   |  23  |  0.75    Ca    8.5      06 Jan 2022 09:32  Mg     2.0     01-06    TPro  5.8<L>  /  Alb  2.7<L>  /  TBili  0.3  /  DBili  x   /  AST  16  /  ALT  18  /  AlkPhos  72  01-06          Procalcitonin, Serum: 0.17 (12-27 @ 16:51)            Rapid RVP Result: Detected          MICROBIOLOGY:          RADIOLOGY & ADDITIONAL STUDIES:    < from: Xray Chest 1 View- PORTABLE-Urgent (Xray Chest 1 View- PORTABLE-Urgent .) (01.04.22 @ 10:26) >  IMPRESSION:    Unchanged patchy opacities as above.    < end of copied text >

## 2022-01-06 NOTE — PROGRESS NOTE ADULT - ASSESSMENT
87 yo F with no reported PMHx, presents to the ED for generalized weakness for the past few days. Pt has also had subjective fevers and her daughter called 911. EMS found the pt to be hypoxic to low 80s on RA, placed on NRB. Here, pt improved to high 90s on 4L NC. She denies SOB, CP, dizziness, n/v/d, abd pain, leg swelling.    covid positive, admitted with acute hypoxic respiratory failure,   ID and Pulm f/u  chest ct neg for PNA on admission, and CTA chest neg for PE 12/30  pulm f/u noted  Symbicort and proventil HFA,   completed remdesivir, /steroids  neg dopplers for DVT,   Lovenox 40 bid,   denies having pain in right shoulder, has chronic AC dislocation, mult healed rib fractures  this was d/w in detail  ptn's daughter kp by dr garrett  ptn is DNR  pt

## 2022-01-06 NOTE — PROGRESS NOTE ADULT - SUBJECTIVE AND OBJECTIVE BOX
DATE OF SERVICE: 01-06-22 @ 12:48  CHIEF COMPLAINT:Patient is a 86y old  Female who presents with a chief complaint of   	        PAST MEDICAL & SURGICAL HISTORY:  No pertinent past medical history    No significant past surgical history            weak  NECK: No pain or stiffness  RESPIRATORY: No cough, wheezing, chills or hemoptysis; No Shortness of Breath  CARDIOVASCULAR: No chest pain, palpitations, passing out,  GASTROINTESTINAL: No abdominal or epigastric pain.   GENITOURINARY: No dysuria, frequency, hematuria,   NEUROLOGICAL: No headaches,     Medications:  MEDICATIONS  (STANDING):  amLODIPine   Tablet 5 milliGRAM(s) Oral daily  budesonide 160 MICROgram(s)/formoterol 4.5 MICROgram(s) Inhaler 2 Puff(s) Inhalation two times a day  dexAMETHasone  Injectable 6 milliGRAM(s) IV Push daily  enoxaparin Injectable 40 milliGRAM(s) SubCutaneous every 12 hours  guaiFENesin ER 1200 milliGRAM(s) Oral every 12 hours  influenza  Vaccine (HIGH DOSE) 0.7 milliLiter(s) IntraMuscular once  tiotropium 18 MICROgram(s) Capsule 1 Capsule(s) Inhalation daily    MEDICATIONS  (PRN):    	    PHYSICAL EXAM:  T(C): 36.6 (01-06-22 @ 10:37), Max: 36.6 (01-06-22 @ 10:37)  HR: 75 (01-06-22 @ 10:37) (63 - 75)  BP: 133/81 (01-06-22 @ 10:37) (124/76 - 134/79)  RR: 20 (01-06-22 @ 10:37) (18 - 20)  SpO2: 96% (01-06-22 @ 10:37) (92% - 97%)  Wt(kg): --  I&O's Summary    05 Jan 2022 07:01  -  06 Jan 2022 07:00  --------------------------------------------------------  IN: 220 mL / OUT: 400 mL / NET: -180 mL    06 Jan 2022 07:01  -  06 Jan 2022 12:48  --------------------------------------------------------  IN: 380 mL / OUT: 0 mL / NET: 380 mL        Appearance: Normal	  HEENT:   Normal oral mucosa, PERRL, EOMI	  Lymphatic: No lymphadenopathy  Cardiovascular: Normal S1 S2, No JVD,   Respiratory: dec bs   Gastrointestinal:  Soft, Non-tender, + BS	    Neurologic: Non-focal  Extremities: dec rom     TELEMETRY: 	    ECG:  	  RADIOLOGY:  OTHER: 	  	  LABS:	 	    CARDIAC MARKERS:                                14.5   15.12 )-----------( 238      ( 06 Jan 2022 09:32 )             46.0     01-06    133<L>  |  100  |  24<H>  ----------------------------<  128<H>  4.6   |  23  |  0.75    Ca    8.5      06 Jan 2022 09:32  Mg     2.0     01-06    TPro  5.8<L>  /  Alb  2.7<L>  /  TBili  0.3  /  DBili  x   /  AST  16  /  ALT  18  /  AlkPhos  72  01-06    proBNP:   Lipid Profile:   HgA1c:   TSH:

## 2022-01-06 NOTE — PROGRESS NOTE ADULT - ASSESSMENT
ASSESSMENT:    unvaccinated woman without significant past medical/surgical history admitted with COVID-19 infection complicated by acute hypoxic respiratory failure due to extensive pneumonia, bronchospasm and obesity related restrictive lung disease - CTA chest 12/30 reveals bilateral multifocal peripheral ground glass opacities c/w COVID related pneumonia - no evidence of pulmonary edema or pleural effusions especially in the setting of a non-elevated pro-BNP level - no evidence of a superimposed bacterial infection in the absence of an elevated procalcitonin level - no evidence of pulmonary embolism - suspect the new leukocytosis is due to high dose steroids rather than infection (resolved)    PLAN/RECOMMENDATIONS:    airborne and contact isolation  follow-up CXR 1/4 -> no change in patchy opacities predominantly in the bilateral   mid/lower lungs  oxygen supplementation to keep saturation greater than 92% - humidified nasal canula @ 5lpm  observe off antibacterial antibiotics and diuretics  symbicort/spiriva  mucinex 1200mg 2 times daily  ID holding off with tocilizumab  has completed a 5 day course of remdesivir - renal and liver function are stable  decadron 6mg IV/po day # 10/10 - following finger stick glucoses and treating hyperglycemia   diligent DVT prophylaxis - SQ lovenox 40mg 2 times daily  following inflammatory markers - ferritin (moderately elevated) - d-dimer (moderately elevated  but decreasing) - CRP (minimally elevated)  not a candidate for monoclonal antibodies on oxygen in the inpatient setting  consider orthopedic evaluation for right shoulder dislocation  should receive the COVID vaccination 30 days from discharge  out of bed and into the chair - physical therapy evaluation called at the nursing staff request    Will follow with you. Plan of care discussed with the patient at bedside. Patient is DNR.    Smith Robb MD, [facility]  [phone]  Pulmonary Medicine

## 2022-01-06 NOTE — PROVIDER CONTACT NOTE (OTHER) - SITUATION
Patient diagnosed with Covid >10 days ago (+ 12/27). Afebrile. No further isolation required.
Same as above

## 2022-01-06 NOTE — PROGRESS NOTE ADULT - ASSESSMENT
85 yo F with no reported PMHx, presents to the ED for generalized weakness for the past few days. Pt has also had subjective fevers and her daughter called 911. EMS found the pt to be hypoxic to low 80s on RA, placed on NRB. Here, pt improved to high 90s on 4L NC. She denies SOB, CP, dizziness, n/v/d, abd pain, leg swelling.   Pt has not seen a doctor in over 6 years but pt has followed cardiology in the past, Dr. Elliot Caballero.   Daughter Daxa [phone] (27 Dec 2021 18:25)    ER vitals:  98.8, P 108, /94.  On supplemental O2, requiring nasal cannula 6 to 8 L.      Covid (+):    - Pt requiring supplemental O2.  CT chest with b/l groundglass densities c/w covid pna.  Agree with remdesivir and dexamethasone.  Monitor daily LFTs and renal function while pt on remdesivir.    - Maintain oxygenation >90%.  Pt on 50% VM.    - Check inflammatory markers:  DD:  1575 <-- 1109 <-- 4014 <-- 1014 <-- 861  Ferritin: 569 <--  531 <-- 717 <-- 799  Crp: 11 <-- 133  Pct: 0.17    - LE dopplers neg for DVT and CTA chest neg PE.  D-dimer trending down, now slightly elevated.  Cont lovenox BID dosing.    - Monitor off abx, do not suspect superimposed bacterial infection.    - Pt's oxygenation slowly improving, on NC 5L.          Asuncion Nice  [phone]

## 2022-01-06 NOTE — PROGRESS NOTE ADULT - SUBJECTIVE AND OBJECTIVE BOX
[facility] - PROGRESS NOTE    CHIEF COMPLAINT: COVID-19 related pneumonia; acute hypoxic respiratory failure; bronchospasm; obesity;     INTERVAL HISTORY: CTA 12/30 - no pulmonary embolism -  increasing diffuse bilateral peripheral and peribronchial vascular groundglass opacities c/w worsening COVID pneumonia; more awake and alert - just ate her entire breakfast and asking for oatmeal; no shortness of breath or hypoxemia on a 7lpm nasal canula - frequently taking off her oxygen; occasional cough productive of scant sputum; resolved chest congestion and wheeze; no fevers, chills or sweats; no chest pain/pressure or palpitations; has yet to be out of bed    REVIEW OF SYSTEMS:  Constitutional: As per interval history  HEENT: Within normal limits  CV: As per interval history  Resp: As per interval history  GI: Within normal limits   : Within normal limits  Musculoskeletal: shoulder arthritis - dislocated right shoulder  Skin: Within normal limits  Neurological: Within normal limits  Psychiatric: Within normal limits  Endocrine: Within normal limits  Hematologic/Lymphatic: Within normal limits  Allergic/Immunologic: Within normal limits    MEDICATIONS:     Pulmonary "  budesonide 160 MICROgram(s)/formoterol 4.5 MICROgram(s) Inhaler 2 Puff(s) Inhalation two times a day  guaiFENesin ER 1200 milliGRAM(s) Oral every 12 hours  tiotropium 18 MICROgram(s) Capsule 1 Capsule(s) Inhalation daily    Anti-microbials:    Cardiovascular:  amLODIPine   Tablet 5 milliGRAM(s) Oral daily    Other:  dexAMETHasone  Injectable 6 milliGRAM(s) IV Push daily  enoxaparin Injectable 40 milliGRAM(s) SubCutaneous every 12 hours  influenza  Vaccine (HIGH DOSE) 0.7 milliLiter(s) IntraMuscular once    OBJECTIVE:    I&O's Detail    05 Jan 2022 07:01  -  06 Jan 2022 07:00  --------------------------------------------------------  IN:    Oral Fluid: 220 mL  Total IN: 220 mL    OUT:    Voided (mL): 400 mL  Total OUT: 400 mL    Total NET: -180 mL    PHYSICAL EXAM:       ICU Vital Signs Last 24 Hrs  T(C): 36.3 (06 Jan 2022 06:45), Max: 36.8 (05 Jan 2022 08:35)  T(F): 97.4 (06 Jan 2022 06:45), Max: 98.3 (05 Jan 2022 08:35)  HR: 63 (06 Jan 2022 06:45) (56 - 71)  BP: 134/79 (06 Jan 2022 06:45) (124/76 - 145/76)  BP(mean): --  ABP: --  ABP(mean): --  RR: 20 (06 Jan 2022 06:45) (18 - 20)  SpO2: 97% (06 Jan 2022 06:45) (92% - 97%) on 7lpm nasal canula     General: Awake. Alert. Cooperative. No distress. Appears stated age. Obese. In bed.  HEENT: Atraumatic. Normocephalic. Anicteric. Normal oral mucosa. PERRL. EOMI.  Neck: Supple. Trachea midline. Thyroid without enlargement/tenderness/nodules. No carotid bruit. No JVD.	  Cardiovascular: Regular rate and rhythm. S1 S2 normal. II/VI systolic murmur.  Respiratory: Respirations unlabored. Decreasing rales. No wheeze. Kyphosis.  Abdomen: Soft. Non-tender. Non-distended. No organomegaly. No masses. Normal bowel sounds. Obese.  Extremities: Warm to touch. No clubbing or cyanosis. No pedal edema.  Pulses: 2+ peripheral pulses all extremities.	  Skin: Normal skin color. No rashes or lesions. No ecchymoses. No cyanosis. Warm to touch.  Lymph Nodes: Cervical, supraclavicular and axillary nodes normal  Neurological: Motor and sensory examination equal and normal. A and O x 3  Psychiatry: Appropriate mood and affect.    LABS:      CBC    WBC  10.93 <==, 11.90 <==    Hemoglobin  14.1 <<==, 14.3 <<==    Hematocrit  44.1 <==, 44.7 <==    Platelets  296 <==, 293 <==      x   |  x   |  x   ----------------------------<  x     01-05  x    |  x   |  0.74      LYTES    sodium  138 <==, 140 <==, 140 <==    potassium   4.6 <==, 4.2 <==, 4.4 <==    chloride  102 <==, 103 <==, 101 <==    carbon dioxide  27 <==, 24 <==, 29 <==    =============================================================================================  RENAL FUNCTION:    Creatinine:   0.74  <<==, 0.68  <<==, 0.62  <<==, 0.70  <<==, 0.67  <<==, 0.73  <<==    BUN:   27 <==, 25 <==, 27 <==    ============================================================================================    calcium   8.6 <==, 8.4 <==, 9.2 <==    ============================================================================================  LFTs    AST:   21 <== , 10 <== , 18 <== , 11 <== , 11 <== , 12 <==     ALT:  18  <== , 10  <== , 10  <== , 7  <== , 10  <== , 14  <==     AP:  70  <=, 66  <=, 69  <=, 72  <=, 64  <=, 65  <=    Bili:  0.3  <=, 0.4  <=, 0.4  <=, 0.4  <=, 0.4  <=, 0.4  <=    Venous Blood Gas:  12-27 @ 16:50  7.41/45/20/28/37.1  VBG Lactate: 2.2    Procalcitonin, Serum: 0.17 ng/mL (12-27 @ 16:51)    Serum Pro-Brain Natriuretic Peptide: 185 pg/mL (12-27 @ 19:30)  Serum Pro-Brain Natriuretic Peptide: 169 pg/mL (12-27 @ 16    D-Dimer Assay, Quantitative (01.04.22 @ 09:24)   D-Dimer Assay, Quantitative: 1575  D-Dimer Assay, Quantitative (12.31.21 @ 12:14)   D-Dimer Assay, Quantitative: 1109  D-Dimer Assay, Quantitative (12.29.21 @ 09:26)   D-Dimer Assay, Quantitative: 4014  D-Dimer Assay, Quantitative (12.27.21 @ 19:30)   D-Dimer Assay, Quantitative: 1014  D-Dimer Assay, Quantitative (12.27.21 @ 16:51)   D-Dimer Assay, Quantitative: 861      C-Reactive Protein, Serum (01.04.22 @ 09:24)   C-Reactive Protein, Serum: 11 mg/L   C-Reactive Protein, Serum (12.27.21 @ 16:51)   C-Reactive Protein, Serum: 133 mg/L     Ferritin, Serum in AM (01.04.22 @ 16:09)   Ferritin, Serum: 569 ng/mL   Ferritin, Serum in AM (12.31.21 @ 13:51)   Ferritin, Serum: 531 ng/mL   Ferritin, Serum in AM (12.29.21 @ 11:37)   Ferritin, Serum: 717 ng/mL     MICROBIOLOGY:     Respiratory Viral Panel with COVID-19 by MONTY (12.27.21 @ 16:49)   Rapid RVP Result: Detected   SARS-CoV-2: Detected:    RADIOLOGY:  [x ] Chest radiographs reviewed and interpreted by me    < from: Xray Chest 1 View- PORTABLE-Urgent (Xray Chest 1 View- PORTABLE-Urgent .) (01.04.22 @ 10:26) >    EXAM:  XR CHEST PORTABLE URGENT 1V                          PROCEDURE DATE:  01/04/2022      FINDINGS:    There are unchanged patchy opacities predominantly in the bilateral   mid/lower lungs.    No pleural effusion.    Heart size is without change.    Severe degenerative changes of the bilateral glenohumeral joints with   dislocated right humeral head. Rib deformities are again identified.      IMPRESSION:    Unchanged patchy opacities as above.    FLAVIO ACOSTA MD; Resident Radiologist  This document has been electronically signed.  CANDIDO GARCIA MD; Attending Radiologist  This document has been electronically signed. Jan 4 2022  3:54PM  ---------------------------------------------------------------------------------------------------------------  [facility]                          PROCEDURE DATE:  12/30/2021      INTERPRETATION:  CLINICAL INFORMATION: Hypoxia and elevated d-dimer.   Admitted with Covid 19 infection.    FINDINGS:    LUNGS AND AIRWAYS: Trachea is deviated to the right. Tracheobronchial   secretions. Diffuse bilateral peripheral and peribronchial vascular   groundglass opacities, increased when compared with 12/27/2021.  PLEURA: No pleural effusion.  MEDIASTINUM AND JAMILAH: No lymphadenopathy.  VESSELS: No pulmonary embolism. Aortic and coronary artery calcifications.  HEART: Heart size is normal. No pericardial effusion.  CHEST WALL AND LOWER NECK: 1.1 cm left medial breast subcutaneous nodule,   probably a sebaceous cyst. Thyroid is unremarkable.  VISUALIZED UPPER ABDOMEN: Moderate hiatal hernia. 1.1 cm left adrenal   adenoma. 3.4 cm isodense round lesion adjacent to stomach (1, 108)  BONES: Chronic bilateral rib fractures. Inferior right shoulder   dislocation. 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   Multilevel degenerative changes of the thoracic spine. Dextroscoliotic   thoracic curvature.    IMPRESSION:  No pulmonary embolism.    Increased bilateral groundglass opacities, consistent with known Covid 19   infection.    3.4 cm isodense round lesion adjacent to stomach. 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 CT abdomen/pelvis with contrast recommended   for complete evaluation.    PAM LEIVA MD; Resident Radiology  This document has been electronically signed.  OPAL GUZMAN MD; Attending Radiologist  This document has been electronically signed. Dec [id number]  9:43AM  ---------------------------------------------------------------------------------------------------------------  < from: CT Chest No Cont (12.27.21 @ 18:58) >    EXAM:  CT CHEST                          PROCEDURE DATE:  12/27/2021      INTERPRETATION:  CLINICAL INFORMATION: Hypoxia    FINDINGS:    Lungs/Airways/Pleura: There are ill defined peripheral and   peribronchovascular predominant ground glass densities throughout both   lungs. The airways are patent. No pleural effusion.    Mediastinum/Lymph nodes: No thoracic adenopathy. There is a moderate   hiatal hernia.    Heart and Vessels: The cardiac chambers are normal in size. There are   mild coronary artery calcifications. No pericardial effusion. The aorta   is normal in caliber.    Upper Abdomen: Unremarkable.    Osseous structures and Soft Tissues: There are old/healing bilateral rib   fractures. No aggressive bone lesions. There is an inferior right   shoulder dislocation. There are bilateral glenohumeral joint effusions.    IMPRESSION:    1.  Lung findings typically seen in COVID infection. Other infections are   also in the differential    2.  Moderate hiatal hernia    3.  Inferior right shoulder dislocation    AMANDA MCGUIRE M.D., Attending Radiologist  This document has been electronically signed. Dec 28 2021  9:54AM  --------------------------------------------------------------------------------------------------------------  EXAM:  XR CHEST PORTABLE URGENT 1V                          PROCEDURE DATE:  12/27/2021      INTERPRETATION:  CLINICAL INFORMATION: Shortness of breath, cough and   fever    TECHNIQUE: AP view(s) of the chest.    COMPARISON: No comparison available.    FINDINGS:  The cardiac silhouette is normal in size. Bilateral lower lung patchy   airspace opacities. No pleural effusion or pneumothorax. No acute bony   abnormality.    IMPRESSION:  Bilateral lower lung patchy airspace opacities.    NICK DENT DO; Resident Radiologist  This document has been electronically signed.  CANDIDO GARCIA MD; Attending Radiologist  This document has been electronically signed. Dec 28 2021 12:06AM    ---------------------------------------------------------------------------------------------------------------  < from: Xray Shoulder 2 Views, Right (12.29.21 @ 17:30) >    EXAM:  XR SHOULDER COMP MIN 2V RT                          PROCEDURE DATE:  12/29/2021      INTERPRETATION:  CLINICAL INDICATION: radiographic correlation of   dislocated right shoulder detected on recent CT    EXAM:  AP and transscapular Y right shoulder from 12/29/2021 at 1730. Reviewed   in conjunction with chest CT from 12/27/2021.    IMPRESSION:  Redemonstrated anteriorly dislocated right shoulder, likely chronic. AC   joint alignment maintained.    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 Degenerative spurring apparent along   the uncovered glenoid and humeral head articular margins.    Multiple old upper right rib fracture deformities also apparent.    No acute appearing fractures.    Generalized osteopenia otherwise no discrete lytic or blastic lesions.    SANTHOSH NGUYEN MD; Attending Radiologist  This document has been electronically signed. Dec 29 2021  5:37PM    ---------------------------------------------------------------------------------------------------------------

## 2022-01-07 LAB
CRP SERPL-MCNC: 4 MG/L — SIGNIFICANT CHANGE UP (ref 0–4)
D DIMER BLD IA.RAPID-MCNC: 825 NG/ML DDU — HIGH
FERRITIN SERPL-MCNC: 505 NG/ML — HIGH (ref 15–150)
HCT VFR BLD CALC: 43.9 % — SIGNIFICANT CHANGE UP (ref 34.5–45)
HGB BLD-MCNC: 13.8 G/DL — SIGNIFICANT CHANGE UP (ref 11.5–15.5)
LDH SERPL L TO P-CCNC: 271 U/L — HIGH (ref 50–242)
MCHC RBC-ENTMCNC: 28.9 PG — SIGNIFICANT CHANGE UP (ref 27–34)
MCHC RBC-ENTMCNC: 31.4 GM/DL — LOW (ref 32–36)
MCV RBC AUTO: 92 FL — SIGNIFICANT CHANGE UP (ref 80–100)
NRBC # BLD: 0 /100 WBCS — SIGNIFICANT CHANGE UP (ref 0–0)
PLATELET # BLD AUTO: 262 K/UL — SIGNIFICANT CHANGE UP (ref 150–400)
RBC # BLD: 4.77 M/UL — SIGNIFICANT CHANGE UP (ref 3.8–5.2)
RBC # FLD: 13.9 % — SIGNIFICANT CHANGE UP (ref 10.3–14.5)
WBC # BLD: 14.6 K/UL — HIGH (ref 3.8–10.5)
WBC # FLD AUTO: 14.6 K/UL — HIGH (ref 3.8–10.5)

## 2022-01-07 RX ADMIN — BUDESONIDE AND FORMOTEROL FUMARATE DIHYDRATE 2 PUFF(S): 160; 4.5 AEROSOL RESPIRATORY (INHALATION) at 18:00

## 2022-01-07 RX ADMIN — Medication 1200 MILLIGRAM(S): at 20:24

## 2022-01-07 RX ADMIN — Medication 6 MILLIGRAM(S): at 05:20

## 2022-01-07 RX ADMIN — BUDESONIDE AND FORMOTEROL FUMARATE DIHYDRATE 2 PUFF(S): 160; 4.5 AEROSOL RESPIRATORY (INHALATION) at 05:19

## 2022-01-07 RX ADMIN — Medication 1200 MILLIGRAM(S): at 05:19

## 2022-01-07 RX ADMIN — ENOXAPARIN SODIUM 40 MILLIGRAM(S): 100 INJECTION SUBCUTANEOUS at 05:19

## 2022-01-07 RX ADMIN — TIOTROPIUM BROMIDE 1 CAPSULE(S): 18 CAPSULE ORAL; RESPIRATORY (INHALATION) at 12:06

## 2022-01-07 RX ADMIN — AMLODIPINE BESYLATE 5 MILLIGRAM(S): 2.5 TABLET ORAL at 05:19

## 2022-01-07 RX ADMIN — ENOXAPARIN SODIUM 40 MILLIGRAM(S): 100 INJECTION SUBCUTANEOUS at 18:00

## 2022-01-07 NOTE — PROGRESS NOTE ADULT - SUBJECTIVE AND OBJECTIVE BOX
DATE OF SERVICE: 01-07-22 @ 15:35  CHIEF COMPLAINT:Patient is a 86y old  Female who presents with a chief complaint of   	        PAST MEDICAL & SURGICAL HISTORY:  No pertinent past medical history    No significant past surgical history            weak  RESPIRATORY: breathing better  CARDIOVASCULAR: No chest pain, palpitations, passing out, dizziness,  GASTROINTESTINAL: No abdominal or epigastric pain. No nausea, vomiting, or hematemesis;  GENITOURINARY: No dysuria, frequency, hematuria,   NEUROLOGICAL: No headaches    Medications:  MEDICATIONS  (STANDING):  amLODIPine   Tablet 5 milliGRAM(s) Oral daily  budesonide 160 MICROgram(s)/formoterol 4.5 MICROgram(s) Inhaler 2 Puff(s) Inhalation two times a day  dexAMETHasone  Injectable 6 milliGRAM(s) IV Push daily  enoxaparin Injectable 40 milliGRAM(s) SubCutaneous every 12 hours  guaiFENesin ER 1200 milliGRAM(s) Oral every 12 hours  influenza  Vaccine (HIGH DOSE) 0.7 milliLiter(s) IntraMuscular once  tiotropium 18 MICROgram(s) Capsule 1 Capsule(s) Inhalation daily    MEDICATIONS  (PRN):    	    PHYSICAL EXAM:  T(C): 36.5 (01-07-22 @ 08:51), Max: 36.5 (01-06-22 @ 16:16)  HR: 63 (01-07-22 @ 08:51) (60 - 69)  BP: 124/77 (01-07-22 @ 08:51) (124/77 - 152/83)  RR: 18 (01-07-22 @ 08:51) (18 - 18)  SpO2: 92% (01-07-22 @ 08:51) (92% - 96%)  Wt(kg): --  I&O's Summary    06 Jan 2022 07:01  -  07 Jan 2022 07:00  --------------------------------------------------------  IN: 900 mL / OUT: 1200 mL / NET: -300 mL        Appearance: Normal	  HEENT:   Normal oral mucosa, PERRL, EOMI	  Lymphatic: No lymphadenopathy  Cardiovascular: Normal S1 S2, No JVD,  Respiratory: dec bs   Psychiatry: A & O   Gastrointestinal:  Soft, Non-tender, + BS	  Skin: No rashes, No ecchymoses, No cyanosis	  Neurologic: Non-focal  Extremities:dec rom    TELEMETRY: 	    ECG:  	  RADIOLOGY:  OTHER: 	  	  LABS:	 	    CARDIAC MARKERS:                                13.8   14.60 )-----------( 262      ( 07 Jan 2022 07:47 )             43.9     01-06    133<L>  |  100  |  24<H>  ----------------------------<  128<H>  4.6   |  23  |  0.75    Ca    8.5      06 Jan 2022 09:32  Mg     2.0     01-06    TPro  5.8<L>  /  Alb  2.7<L>  /  TBili  0.3  /  DBili  x   /  AST  16  /  ALT  18  /  AlkPhos  72  01-06    proBNP:   Lipid Profile:   HgA1c:   TSH:

## 2022-01-07 NOTE — PROGRESS NOTE ADULT - ASSESSMENT
85 yo F with no reported PMHx, presents to the ED for generalized weakness for the past few days. Pt has also had subjective fevers and her daughter called 911. EMS found the pt to be hypoxic to low 80s on RA, placed on NRB. Here, pt improved to high 90s on 4L NC. She denies SOB, CP, dizziness, n/v/d, abd pain, leg swelling.    covid positive, admitted with acute hypoxic respiratory failure,   ID and Pulm f/u oted  chest ct neg for PNA on admission, and CTA chest neg for PE 12/30  pulm f/u noted  Symbicort and proventil HFA,   completed remdesivir, /steroids  neg dopplers for DVT,   Lovenox 40 bid,   denies having pain in right shoulder, has chronic AC dislocation, mult healed rib fractures  this was d/w in detail  ptn's daughter kp by dr garrett  ptn is DNR  pt

## 2022-01-07 NOTE — PROGRESS NOTE ADULT - SUBJECTIVE AND OBJECTIVE BOX
Infectious Diseases progress note:    Subjective:  Pt afebrile.  On 4L NC.  No acute events.     ROS:  u/a    Allergies    No Known Allergies    Intolerances        ANTIBIOTICS/RELEVANT:  antimicrobials    immunologic:  influenza  Vaccine (HIGH DOSE) 0.7 milliLiter(s) IntraMuscular once    OTHER:  amLODIPine   Tablet 5 milliGRAM(s) Oral daily  budesonide 160 MICROgram(s)/formoterol 4.5 MICROgram(s) Inhaler 2 Puff(s) Inhalation two times a day  dexAMETHasone  Injectable 6 milliGRAM(s) IV Push daily  enoxaparin Injectable 40 milliGRAM(s) SubCutaneous every 12 hours  guaiFENesin ER 1200 milliGRAM(s) Oral every 12 hours  tiotropium 18 MICROgram(s) Capsule 1 Capsule(s) Inhalation daily      Objective:  Vital Signs Last 24 Hrs  T(C): 36.5 (07 Jan 2022 15:43), Max: 36.5 (07 Jan 2022 08:51)  T(F): 97.7 (07 Jan 2022 15:43), Max: 97.7 (07 Jan 2022 08:51)  HR: 69 (07 Jan 2022 15:43) (60 - 69)  BP: 156/83 (07 Jan 2022 15:43) (124/77 - 156/83)  BP(mean): --  RR: 18 (07 Jan 2022 15:43) (18 - 18)  SpO2: 95% (07 Jan 2022 15:43) (92% - 96%)    PHYSICAL EXAM:  u/a        LABS:                        13.8   14.60 )-----------( 262      ( 07 Jan 2022 07:47 )             43.9     01-06    133<L>  |  100  |  24<H>  ----------------------------<  128<H>  4.6   |  23  |  0.75    Ca    8.5      06 Jan 2022 09:32  Mg     2.0     01-06    TPro  5.8<L>  /  Alb  2.7<L>  /  TBili  0.3  /  DBili  x   /  AST  16  /  ALT  18  /  AlkPhos  72  01-06          Procalcitonin, Serum: 0.17 (12-27 @ 16:51)            Rapid RVP Result: Detected          MICROBIOLOGY:          RADIOLOGY & ADDITIONAL STUDIES:

## 2022-01-07 NOTE — PROGRESS NOTE ADULT - ASSESSMENT
87 yo F with no reported PMHx, presents to the ED for generalized weakness for the past few days. Pt has also had subjective fevers and her daughter called 911. EMS found the pt to be hypoxic to low 80s on RA, placed on NRB. Here, pt improved to high 90s on 4L NC. She denies SOB, CP, dizziness, n/v/d, abd pain, leg swelling.   Pt has not seen a doctor in over 6 years but pt has followed cardiology in the past, Dr. Elliot Caballero.   Daughter Daxa [phone] (27 Dec 2021 18:25)    ER vitals:  98.8, P 108, /94.  On supplemental O2, requiring nasal cannula 6 to 8 L.      Covid (+):    - Pt requiring supplemental O2.  CT chest with b/l groundglass densities c/w covid pna.  Agree with remdesivir and dexamethasone.  Monitor daily LFTs and renal function while pt on remdesivir.    - Maintain oxygenation >90%.  Pt on 50% VM.    - Check inflammatory markers:  DD:  1575 <-- 1109 <-- 4014 <-- 1014 <-- 861  Ferritin: 569 <--  531 <-- 717 <-- 799  Crp: 11 <-- 133  Pct: 0.17    - LE dopplers neg for DVT and CTA chest neg PE.  D-dimer trending down, now slightly elevated.  Cont lovenox BID dosing.    - Monitor off abx, do not suspect superimposed bacterial infection.    - Pt's oxygenation slowly improving, on NC 4L.        Dr. Rodrigo Joshi covering 1/8 and 1/9.  [phone]

## 2022-01-07 NOTE — PROGRESS NOTE ADULT - SUBJECTIVE AND OBJECTIVE BOX
[facility] - PROGRESS NOTE    CHIEF COMPLAINT: COVID-19 related pneumonia; acute hypoxic respiratory failure; bronchospasm; obesity;     INTERVAL HISTORY: CTA 12/30 - no pulmonary embolism -  increasing diffuse bilateral peripheral and peribronchial vascular groundglass opacities c/w worsening COVID pneumonia; more awake and alert but states that she is tired and wants to take a nap; no shortness of breath or hypoxemia on a 4lpm nasal canula; occasional cough productive of scant sputum; resolved chest congestion and wheeze; no fevers, chills or sweats; no chest pain/pressure or palpitations; has yet to be out of bed    REVIEW OF SYSTEMS:  Constitutional: As per interval history  HEENT: Within normal limits  CV: As per interval history  Resp: As per interval history  GI: Within normal limits   : Within normal limits  Musculoskeletal: shoulder arthritis - dislocated right shoulder  Skin: Within normal limits  Neurological: Within normal limits  Psychiatric: Within normal limits  Endocrine: Within normal limits  Hematologic/Lymphatic: Within normal limits  Allergic/Immunologic: Within normal limits    MEDICATIONS:     Pulmonary "  budesonide 160 MICROgram(s)/formoterol 4.5 MICROgram(s) Inhaler 2 Puff(s) Inhalation two times a day  guaiFENesin ER 1200 milliGRAM(s) Oral every 12 hours  tiotropium 18 MICROgram(s) Capsule 1 Capsule(s) Inhalation daily    Anti-microbials:    Cardiovascular:  amLODIPine   Tablet 5 milliGRAM(s) Oral daily    Other:  dexAMETHasone  Injectable 6 milliGRAM(s) IV Push daily  enoxaparin Injectable 40 milliGRAM(s) SubCutaneous every 12 hours  influenza  Vaccine (HIGH DOSE) 0.7 milliLiter(s) IntraMuscular once    MEDICATIONS  (PRN):    OBJECTIVE:    I&O's Detail    06 Jan 2022 07:01  -  07 Jan 2022 07:00  --------------------------------------------------------  IN:    Oral Fluid: 900 mL  Total IN: 900 mL    OUT:    Voided (mL): 1200 mL  Total OUT: 1200 mL    Total NET: -300 mL    PHYSICAL EXAM:       ICU Vital Signs Last 24 Hrs  T(C): 36.5 (07 Jan 2022 08:51), Max: 36.5 (06 Jan 2022 16:16)  T(F): 97.7 (07 Jan 2022 08:51), Max: 97.7 (06 Jan 2022 16:16)  HR: 63 (07 Jan 2022 08:51) (60 - 69)  BP: 124/77 (07 Jan 2022 08:51) (124/77 - 152/83)  BP(mean): --  ABP: --  ABP(mean): --  RR: 18 (07 Jan 2022 08:51) (18 - 18)  SpO2: 92% (07 Jan 2022 08:51) (92% - 96%) on 4lpm nasal canula    General: Awake. Alert. Cooperative. No distress. Appears stated age. Obese. In bed.  HEENT: Atraumatic. Normocephalic. Anicteric. Normal oral mucosa. PERRL. EOMI.  Neck: Supple. Trachea midline. Thyroid without enlargement/tenderness/nodules. No carotid bruit. No JVD.	  Cardiovascular: Regular rate and rhythm. S1 S2 normal. II/VI systolic murmur.  Respiratory: Respirations unlabored. Decreasing rales. No wheeze. Kyphosis.  Abdomen: Soft. Non-tender. Non-distended. No organomegaly. No masses. Normal bowel sounds. Obese.  Extremities: Warm to touch. No clubbing or cyanosis. No pedal edema.  Pulses: 2+ peripheral pulses all extremities.	  Skin: Keratotic lesions on the legs.  Lymph Nodes: Cervical, supraclavicular and axillary nodes normal  Neurological: Motor and sensory examination equal and normal. A and O x 3  Psychiatry: Appropriate mood and affect.    LABS:                          13.8   14.60 )-----------( 262      ( 07 Jan 2022 07:47 )             43.9     CBC    WBC  14.60 <==, 15.12 <==, 10.93 <==    Hemoglobin  13.8 <<==, 14.5 <<==, 14.1 <<==    Hematocrit  43.9 <==, 46.0 <==, 44.1 <==    Platelets  262 <==, 238 <==, 296 <==      133<L>  |  100  |  24<H>  ----------------------------<  128<H>    01-06  4.6   |  23  |  0.75      LYTES    sodium  133 <==, 138 <==, 140 <==    potassium   4.6 <==, 4.6 <==, 4.2 <==    chloride  100 <==, 102 <==, 103 <==    carbon dioxide  23 <==, 27 <==, 24 <==    =============================================================================================  RENAL FUNCTION:    Creatinine:   0.75  <<==, 0.74  <<==, 0.68  <<==, 0.62  <<==, 0.70  <<==, 0.67  <<==    BUN:   24 <==, 27 <==, 25 <==    ============================================================================================    calcium   8.5 <==, 8.6 <==, 8.4 <==    mag   2.0 <==    ============================================================================================  LFTs    AST:   16 <== , 21 <== , 10 <== , 18 <== , 11 <== , 11 <==     ALT:  18  <== , 18  <== , 10  <== , 10  <== , 7  <== , 10  <==     AP:  72  <=, 70  <=, 66  <=, 69  <=, 72  <=, 64  <=    Bili:  0.3  <=, 0.3  <=, 0.4  <=, 0.4  <=, 0.4  <=, 0.4  <=    Procalcitonin, Serum: 0.17 ng/mL (12-27 @ 16:51)    Serum Pro-Brain Natriuretic Peptide: 185 pg/mL (12-27 @ 19:30)  Serum Pro-Brain Natriuretic Peptide: 169 pg/mL (12-27 @ 16    D-Dimer Assay, Quantitative (01.07.22 @ 07:47)   D-Dimer Assay, Quantitative: 825  D-Dimer Assay, Quantitative (01.04.22 @ 09:24)   D-Dimer Assay, Quantitative: 1575  D-Dimer Assay, Quantitative (12.31.21 @ 12:14)   D-Dimer Assay, Quantitative: 1109  D-Dimer Assay, Quantitative (12.29.21 @ 09:26)   D-Dimer Assay, Quantitative: 4014  D-Dimer Assay, Quantitative (12.27.21 @ 19:30)   D-Dimer Assay, Quantitative: 1014  D-Dimer Assay, Quantitative (12.27.21 @ 16:51)   D-Dimer Assay, Quantitative: 861    C-Reactive Protein, Serum (01.07.22 @ 07:37)   C-Reactive Protein, Serum: 4 mg/L   C-Reactive Protein, Serum (01.04.22 @ 09:24)   C-Reactive Protein, Serum: 11 mg/L   C-Reactive Protein, Serum (12.27.21 @ 16:51)   C-Reactive Protein, Serum: 133 mg/L     Ferritin, Serum in AM (01.07.22 @ 09:14)   Ferritin, Serum: 505 ng/mL   Ferritin, Serum in AM (01.04.22 @ 16:09)   Ferritin, Serum: 569 ng/mL   Ferritin, Serum in AM (12.31.21 @ 13:51)   Ferritin, Serum: 531 ng/mL   Ferritin, Serum in AM (12.29.21 @ 11:37)   Ferritin, Serum: 717 ng/mL   Ferritin, Serum (12.27.21 @ 23:22)   Ferritin, Serum: 799 ng/mL   MICROBIOLOGY:     Respiratory Viral Panel with COVID-19 by MONTY (12.27.21 @ 16:49)   Rapid RVP Result: Detected   SARS-CoV-2: Detected:    RADIOLOGY:  [x ] Chest radiographs reviewed and interpreted by me    < from: Xray Chest 1 View- PORTABLE-Urgent (Xray Chest 1 View- PORTABLE-Urgent .) (01.04.22 @ 10:26) >    EXAM:  XR CHEST PORTABLE URGENT 1V                          PROCEDURE DATE:  01/04/2022      FINDINGS:    There are unchanged patchy opacities predominantly in the bilateral   mid/lower lungs.    No pleural effusion.    Heart size is without change.    Severe degenerative changes of the bilateral glenohumeral joints with   dislocated right humeral head. Rib deformities are again identified.      IMPRESSION:    Unchanged patchy opacities as above.    FLAVIO ACOSTA MD; Resident Radiologist  This document has been electronically signed.  CANDIDO GARCIA MD; Attending Radiologist  This document has been electronically signed. Jan 4 2022  3:54PM  ---------------------------------------------------------------------------------------------------------------  [facility]                          PROCEDURE DATE:  12/30/2021      INTERPRETATION:  CLINICAL INFORMATION: Hypoxia and elevated d-dimer.   Admitted with Covid 19 infection.    FINDINGS:    LUNGS AND AIRWAYS: Trachea is deviated to the right. Tracheobronchial   secretions. Diffuse bilateral peripheral and peribronchial vascular   groundglass opacities, increased when compared with 12/27/2021.  PLEURA: No pleural effusion.  MEDIASTINUM AND JAMILAH: No lymphadenopathy.  VESSELS: No pulmonary embolism. Aortic and coronary artery calcifications.  HEART: Heart size is normal. No pericardial effusion.  CHEST WALL AND LOWER NECK: 1.1 cm left medial breast subcutaneous nodule,   probably a sebaceous cyst. Thyroid is unremarkable.  VISUALIZED UPPER ABDOMEN: Moderate hiatal hernia. 1.1 cm left adrenal   adenoma. 3.4 cm isodense round lesion adjacent to stomach (1, 108)  BONES: Chronic bilateral rib fractures. Inferior right shoulder   dislocation. 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   Multilevel degenerative changes of the thoracic spine. Dextroscoliotic   thoracic curvature.    IMPRESSION:  No pulmonary embolism.    Increased bilateral groundglass opacities, consistent with known Covid 19   infection.    3.4 cm isodense round lesion adjacent to stomach. 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 CT abdomen/pelvis with contrast recommended   for complete evaluation.    PAM LEIVA MD; Resident Radiology  This document has been electronically signed.  OPAL GUZMAN MD; Attending Radiologist  This document has been electronically signed. Dec [id number]  9:43AM  ---------------------------------------------------------------------------------------------------------------  < from: CT Chest No Cont (12.27.21 @ 18:58) >    EXAM:  CT CHEST                          PROCEDURE DATE:  12/27/2021      INTERPRETATION:  CLINICAL INFORMATION: Hypoxia    FINDINGS:    Lungs/Airways/Pleura: There are ill defined peripheral and   peribronchovascular predominant ground glass densities throughout both   lungs. The airways are patent. No pleural effusion.    Mediastinum/Lymph nodes: No thoracic adenopathy. There is a moderate   hiatal hernia.    Heart and Vessels: The cardiac chambers are normal in size. There are   mild coronary artery calcifications. No pericardial effusion. The aorta   is normal in caliber.    Upper Abdomen: Unremarkable.    Osseous structures and Soft Tissues: There are old/healing bilateral rib   fractures. No aggressive bone lesions. There is an inferior right   shoulder dislocation. There are bilateral glenohumeral joint effusions.    IMPRESSION:    1.  Lung findings typically seen in COVID infection. Other infections are   also in the differential    2.  Moderate hiatal hernia    3.  Inferior right shoulder dislocation    AMANDA MCGUIRE M.D., Attending Radiologist  This document has been electronically signed. Dec 28 2021  9:54AM  --------------------------------------------------------------------------------------------------------------  EXAM:  XR CHEST PORTABLE URGENT 1V                          PROCEDURE DATE:  12/27/2021      INTERPRETATION:  CLINICAL INFORMATION: Shortness of breath, cough and   fever    TECHNIQUE: AP view(s) of the chest.    COMPARISON: No comparison available.    FINDINGS:  The cardiac silhouette is normal in size. Bilateral lower lung patchy   airspace opacities. No pleural effusion or pneumothorax. No acute bony   abnormality.    IMPRESSION:  Bilateral lower lung patchy airspace opacities.    NICK DENT DO; Resident Radiologist  This document has been electronically signed.  CANDIDO GARCIA MD; Attending Radiologist  This document has been electronically signed. Dec 28 2021 12:06AM    ---------------------------------------------------------------------------------------------------------------  < from: Xray Shoulder 2 Views, Right (12.29.21 @ 17:30) >    EXAM:  XR SHOULDER COMP MIN 2V RT                          PROCEDURE DATE:  12/29/2021      INTERPRETATION:  CLINICAL INDICATION: radiographic correlation of   dislocated right shoulder detected on recent CT    EXAM:  AP and transscapular Y right shoulder from 12/29/2021 at 1730. Reviewed   in conjunction with chest CT from 12/27/2021.    IMPRESSION:  Redemonstrated anteriorly dislocated right shoulder, likely chronic. AC   joint alignment maintained.    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 Degenerative spurring apparent along   the uncovered glenoid and humeral head articular margins.    Multiple old upper right rib fracture deformities also apparent.    No acute appearing fractures.    Generalized osteopenia otherwise no discrete lytic or blastic lesions.    SANTHOSH NGUYEN MD; Attending Radiologist  This document has been electronically signed. Dec 29 2021  5:37PM    ---------------------------------------------------------------------------------------------------------------

## 2022-01-07 NOTE — PROGRESS NOTE ADULT - ASSESSMENT
ASSESSMENT:    unvaccinated woman without significant past medical/surgical history admitted with COVID-19 infection complicated by acute hypoxic respiratory failure due to extensive pneumonia, bronchospasm and obesity related restrictive lung disease - CTA chest 12/30 reveals bilateral multifocal peripheral ground glass opacities c/w COVID related pneumonia - no evidence of pulmonary edema or pleural effusions especially in the setting of a non-elevated pro-BNP level - no evidence of a superimposed bacterial infection in the absence of an elevated procalcitonin level - no evidence of pulmonary embolism - suspect the new leukocytosis is due to high dose steroids rather than infection (resolved)    PLAN/RECOMMENDATIONS:    airborne and contact isolation  follow-up CXR 1/4 -> no change in patchy opacities predominantly in the bilateral   mid/lower lungs  oxygen supplementation to keep saturation greater than 92% - humidified nasal canula @ 4lpm  observe off antibacterial antibiotics and diuretics  symbicort/spiriva  mucinex 1200mg 2 times daily  ID holding off with tocilizumab  has completed a 5 day course of remdesivir - renal and liver function are stable  decadron 6mg IV/po day # 10/10 - following finger stick glucoses and treating hyperglycemia   diligent DVT prophylaxis - SQ lovenox 40mg 2 times daily  following inflammatory markers - ferritin (moderately elevated) - d-dimer (moderately elevated but decreasing) - CRP (minimally elevated)  not a candidate for monoclonal antibodies on oxygen in the inpatient setting  consider orthopedic evaluation for right shoulder dislocation  should receive the COVID vaccination 30 days from discharge  out of bed and into the chair - physical therapy evaluation called at the nursing staff request    Will follow with you. Plan of care discussed with the patient at bedside. Patient is DNR.    Smith Robb MD, [facility]  [phone]  Pulmonary Medicine

## 2022-01-08 LAB
ANION GAP SERPL CALC-SCNC: 12 MMOL/L — SIGNIFICANT CHANGE UP (ref 5–17)
ANION GAP SERPL CALC-SCNC: 14 MMOL/L — SIGNIFICANT CHANGE UP (ref 5–17)
BUN SERPL-MCNC: 22 MG/DL — SIGNIFICANT CHANGE UP (ref 7–23)
BUN SERPL-MCNC: 24 MG/DL — HIGH (ref 7–23)
CALCIUM SERPL-MCNC: 8.9 MG/DL — SIGNIFICANT CHANGE UP (ref 8.4–10.5)
CALCIUM SERPL-MCNC: 9 MG/DL — SIGNIFICANT CHANGE UP (ref 8.4–10.5)
CHLORIDE SERPL-SCNC: 96 MMOL/L — SIGNIFICANT CHANGE UP (ref 96–108)
CHLORIDE SERPL-SCNC: 96 MMOL/L — SIGNIFICANT CHANGE UP (ref 96–108)
CO2 SERPL-SCNC: 23 MMOL/L — SIGNIFICANT CHANGE UP (ref 22–31)
CO2 SERPL-SCNC: 26 MMOL/L — SIGNIFICANT CHANGE UP (ref 22–31)
CREAT SERPL-MCNC: 0.7 MG/DL — SIGNIFICANT CHANGE UP (ref 0.5–1.3)
CREAT SERPL-MCNC: 0.88 MG/DL — SIGNIFICANT CHANGE UP (ref 0.5–1.3)
GLUCOSE SERPL-MCNC: 205 MG/DL — HIGH (ref 70–99)
GLUCOSE SERPL-MCNC: 219 MG/DL — HIGH (ref 70–99)
HCT VFR BLD CALC: 44.4 % — SIGNIFICANT CHANGE UP (ref 34.5–45)
HGB BLD-MCNC: 14 G/DL — SIGNIFICANT CHANGE UP (ref 11.5–15.5)
MCHC RBC-ENTMCNC: 28.7 PG — SIGNIFICANT CHANGE UP (ref 27–34)
MCHC RBC-ENTMCNC: 31.5 GM/DL — LOW (ref 32–36)
MCV RBC AUTO: 91.2 FL — SIGNIFICANT CHANGE UP (ref 80–100)
NRBC # BLD: 0 /100 WBCS — SIGNIFICANT CHANGE UP (ref 0–0)
PLATELET # BLD AUTO: 272 K/UL — SIGNIFICANT CHANGE UP (ref 150–400)
POTASSIUM SERPL-MCNC: 4.5 MMOL/L — SIGNIFICANT CHANGE UP (ref 3.5–5.3)
POTASSIUM SERPL-MCNC: 6.1 MMOL/L — HIGH (ref 3.5–5.3)
POTASSIUM SERPL-SCNC: 4.5 MMOL/L — SIGNIFICANT CHANGE UP (ref 3.5–5.3)
POTASSIUM SERPL-SCNC: 6.1 MMOL/L — HIGH (ref 3.5–5.3)
RBC # BLD: 4.87 M/UL — SIGNIFICANT CHANGE UP (ref 3.8–5.2)
RBC # FLD: 13.7 % — SIGNIFICANT CHANGE UP (ref 10.3–14.5)
SODIUM SERPL-SCNC: 133 MMOL/L — LOW (ref 135–145)
SODIUM SERPL-SCNC: 134 MMOL/L — LOW (ref 135–145)
WBC # BLD: 18.27 K/UL — HIGH (ref 3.8–10.5)
WBC # FLD AUTO: 18.27 K/UL — HIGH (ref 3.8–10.5)

## 2022-01-08 RX ADMIN — BUDESONIDE AND FORMOTEROL FUMARATE DIHYDRATE 2 PUFF(S): 160; 4.5 AEROSOL RESPIRATORY (INHALATION) at 18:18

## 2022-01-08 RX ADMIN — TIOTROPIUM BROMIDE 1 CAPSULE(S): 18 CAPSULE ORAL; RESPIRATORY (INHALATION) at 13:06

## 2022-01-08 RX ADMIN — AMLODIPINE BESYLATE 5 MILLIGRAM(S): 2.5 TABLET ORAL at 06:01

## 2022-01-08 RX ADMIN — Medication 6 MILLIGRAM(S): at 06:01

## 2022-01-08 RX ADMIN — ENOXAPARIN SODIUM 40 MILLIGRAM(S): 100 INJECTION SUBCUTANEOUS at 06:01

## 2022-01-08 RX ADMIN — BUDESONIDE AND FORMOTEROL FUMARATE DIHYDRATE 2 PUFF(S): 160; 4.5 AEROSOL RESPIRATORY (INHALATION) at 06:01

## 2022-01-08 RX ADMIN — Medication 1200 MILLIGRAM(S): at 06:01

## 2022-01-08 NOTE — PROGRESS NOTE ADULT - ASSESSMENT
85 yo F with no reported PMHx, presents to the ED for generalized weakness for the past few days. Pt has also had subjective fevers and her daughter called 911. EMS found the pt to be hypoxic to low 80s on RA, placed on NRB. Here, pt improved to high 90s on 4L NC. She denies SOB, CP, dizziness, n/v/d, abd pain, leg swelling.   Pt has not seen a doctor in over 6 years but pt has followed cardiology in the past, Dr. Elliot Caballero.   Daughter Daxa [phone] found to have COVID    Covid (+):  - Pt requiring supplemental O2.  CT chest with b/l groundglass densities c/w covid pna.    sp remdesivir   dexamethasone started 12/27      - Monitor off abx, do not suspect superimposed bacterial infection.    1/8 - NC 4L, today day 13 of steroids so recs to follow on duration, cont LMWH       Dr. Rodrigo Joshi covering 1/8 and 1/9.  [phone]    Starting Monday Dr Asuncion Nice will be re-assuming care of this patient so starting at that point please feel free to reach out to her with any questions [phone]

## 2022-01-08 NOTE — PROGRESS NOTE ADULT - SUBJECTIVE AND OBJECTIVE BOX
DATE OF SERVICE: 01-08-22 @ 13:05  CHIEF COMPLAINT:Patient is a 86y old  Female who presents with a chief complaint of   	        PAST MEDICAL & SURGICAL HISTORY:  No pertinent past medical history    No significant past surgical history            REVIEW OF SYSTEMS:  feels ok  RESPIRATORY:dec cough / dec sob  CARDIOVASCULAR: No chest pain, palpitations, passing out, dizziness, or leg swelling  GASTROINTESTINAL: No abdominal or epigastric pain.   GENITOURINARY: No dysuria, frequency, hematuria,  NEUROLOGICAL: No headaches    Medications:  MEDICATIONS  (STANDING):  amLODIPine   Tablet 5 milliGRAM(s) Oral daily  budesonide 160 MICROgram(s)/formoterol 4.5 MICROgram(s) Inhaler 2 Puff(s) Inhalation two times a day  dexAMETHasone  Injectable 6 milliGRAM(s) IV Push daily  enoxaparin Injectable 40 milliGRAM(s) SubCutaneous every 12 hours  guaiFENesin ER 1200 milliGRAM(s) Oral every 12 hours  influenza  Vaccine (HIGH DOSE) 0.7 milliLiter(s) IntraMuscular once  tiotropium 18 MICROgram(s) Capsule 1 Capsule(s) Inhalation daily    MEDICATIONS  (PRN):    	    PHYSICAL EXAM:  T(C): 36.5 (01-08-22 @ 09:33), Max: 36.5 (01-07-22 @ 15:43)  HR: 79 (01-08-22 @ 09:33) (62 - 79)  BP: 116/72 (01-08-22 @ 09:33) (116/72 - 156/83)  RR: 18 (01-08-22 @ 09:33) (18 - 18)  SpO2: 93% (01-08-22 @ 09:33) (92% - 95%)  Wt(kg): --  I&O's Summary    07 Jan 2022 07:01  -  08 Jan 2022 07:00  --------------------------------------------------------  IN: 0 mL / OUT: 300 mL / NET: -300 mL        Appearance: Normal	  HEENT:   Normal oral mucosa, PERRL, EOMI	  Lymphatic: No lymphadenopathy  Cardiovascular: Normal S1 S2, No JVD, =  Respiratory: dec bs   Gastrointestinal:  Soft, Non-tender, + BS	  Skin: No rashes, No ecchymoses, No cyanosis	  Neurologic: Non-focal  Extremities: dec rom  TELEMETRY: 	    ECG:  	  RADIOLOGY:  OTHER: 	  	  LABS:	 	    CARDIAC MARKERS:                                14.0   18.27 )-----------( 272      ( 08 Jan 2022 10:44 )             44.4     01-08    133<L>  |  96  |  22  ----------------------------<  205<H>  6.1<H>   |  23  |  0.70    Ca    8.9      08 Jan 2022 10:44      proBNP:   Lipid Profile:   HgA1c:   TSH:

## 2022-01-08 NOTE — PROGRESS NOTE ADULT - SUBJECTIVE AND OBJECTIVE BOX
[facility] DIVISION of INFECTIOUS DISEASE  Sebastián Joshi MD PhD, Neida Gan MD, Della Law MD, Jimi Ceja MD, Abdulaziz Gordon MD  and providing coverage with Asuncion Nice MD and Migel Ordonez MD  Providing Infectious Disease Consultations at [facility], [facility]'s      Office# [phone] to schedule follow up appointments  Answering Service for urgent calls or New Consults [phone]  Cell# to text for urgent issues Sebastián Joshi [phone]     Infectious diseases progress note:    BILLY BERMUDEZ is a 86y y. o. Female patient    COVID Patient    Allergies    No Known Allergies    Intolerances        ANTIBIOTICS/RELEVANT:  antimicrobials    immunologic:  influenza  Vaccine (HIGH DOSE) 0.7 milliLiter(s) IntraMuscular once    OTHER:  amLODIPine   Tablet 5 milliGRAM(s) Oral daily  budesonide 160 MICROgram(s)/formoterol 4.5 MICROgram(s) Inhaler 2 Puff(s) Inhalation two times a day  dexAMETHasone  Injectable 6 milliGRAM(s) IV Push daily  enoxaparin Injectable 40 milliGRAM(s) SubCutaneous every 12 hours  guaiFENesin ER 1200 milliGRAM(s) Oral every 12 hours  tiotropium 18 MICROgram(s) Capsule 1 Capsule(s) Inhalation daily      Objective:  Vital Signs Last 24 Hrs  T(C): 36.5 (08 Jan 2022 09:33), Max: 36.5 (07 Jan 2022 15:43)  T(F): 97.7 (08 Jan 2022 09:33), Max: 97.7 (07 Jan 2022 15:43)  HR: 79 (08 Jan 2022 09:33) (62 - 79)  BP: 116/72 (08 Jan 2022 09:33) (116/72 - 156/83)  BP(mean): --  RR: 18 (08 Jan 2022 09:33) (18 - 18)  SpO2: 93% (08 Jan 2022 09:33) (92% - 95%)    T(C): 36.5 (01-08-22 @ 09:33), Max: 36.5 (01-06-22 @ 16:16)  T(C): 36.5 (01-08-22 @ 09:33), Max: 36.7 (01-06-22 @ 14:05)  T(C): 36.5 (01-08-22 @ 09:33), Max: 36.8 (01-05-22 @ 08:35)    PHYSICAL EXAM:  HEENT: NC atraumatic  Neck: supple  Respiratory: no accessory muscle use, breathing comfortably  Cardiovascular: distant  Gastrointestinal: normal appearing, nondistended  Extremities: no clubbing, no cyanosis,        LABS:                          14.0   18.27 )-----------( 272      ( 08 Jan 2022 10:44 )             44.4       WBC  18.27 01-08 @ 10:44  14.60 01-07 @ 07:47  15.12 01-06 @ 09:32      01-08    133<L>  |  96  |  22  ----------------------------<  205<H>  6.1<H>   |  23  |  0.70    Ca    8.9      08 Jan 2022 10:44        Creatinine, Serum: 0.70 mg/dL (01-08-22 @ 10:44)  Creatinine, Serum: 0.75 mg/dL (01-06-22 @ 09:32)  Creatinine, Serum: 0.74 mg/dL (01-05-22 @ 11:36)  Creatinine, Serum: 0.68 mg/dL (01-04-22 @ 09:24)  Creatinine, Serum: 0.70 mg/dL (01-04-22 @ 09:24)  Creatinine, Serum: 0.62 mg/dL (01-03-22 @ 07:28)  Creatinine, Serum: 0.70 mg/dL (01-02-22 @ 07:26)                COVID RISK SCORE  Auto Neutrophil #: 6.96 K/uL (12-27-21 @ 16:51)  Auto Lymphocyte #: 1.19 K/uL (12-27-21 @ 16:51)      Auto Eosinophil #: 0.01 K/uL (12-27-21 @ 16:51)    Lactate Dehydrogenase, Serum: 271 U/L (01-07-22 @ 07:37)  Lactate Dehydrogenase, Serum: 441 U/L (12-31-21 @ 12:14)  Lactate Dehydrogenase, Serum: 546 U/L (12-29-21 @ 09:27)      Procalcitonin, Serum: 0.17 ng/mL (12-27-21 @ 16:51)            Ferritin, Serum: 505 ng/mL (01-07-22 @ 09:14)  Ferritin, Serum: 569 ng/mL (01-04-22 @ 16:09)  Ferritin, Serum: 531 ng/mL (12-31-21 @ 13:51)  Ferritin, Serum: 717 ng/mL (12-29-21 @ 11:37)  Ferritin, Serum: 799 ng/mL (12-27-21 @ 23:22)        Activated Partial Thromboplastin Time: 26.3 sec (12-27-21 @ 16:51)  INR: 1.09 ratio (12-27-21 @ 16:51)    D-Dimer Assay, Quantitative: 825 ng/mL DDU (01-07-22 @ 07:47)  D-Dimer Assay, Quantitative: 1575 ng/mL DDU (01-04-22 @ 09:24)  D-Dimer Assay, Quantitative: 1109 ng/mL DDU (12-31-21 @ 12:14)  D-Dimer Assay, Quantitative: 4014 ng/mL DDU (12-29-21 @ 09:26)  D-Dimer Assay, Quantitative: 1014 ng/mL DDU (12-27-21 @ 19:30)  D-Dimer Assay, Quantitative: 861 ng/mL DDU (12-27-21 @ 16:51)        MICROBIOLOGY:              SARS-CoV-2: Detected (27 Dec 2021 16:49)      RADIOLOGY & ADDITIONAL STUDIES:

## 2022-01-08 NOTE — PROGRESS NOTE ADULT - ASSESSMENT
85 yo F with no reported PMHx, presents to the ED for generalized weakness for the past few days. Pt has also had subjective fevers and her daughter called 911. EMS found the pt to be hypoxic to low 80s on RA, placed on NRB. Here, pt improved to high 90s on 4L NC. She denies SOB, CP, dizziness, n/v/d, abd pain, leg swelling.    covid positive, admitted with acute hypoxic respiratory failure,   ID and Pulm f/u oted  chest ct neg for PNA on admission, and CTA chest neg for PE 12/30  pulm f/u noted  taper o2 as able  Symbicort and proventil HFA,   completed remdesivir, /steroids  neg dopplers for DVT,   Lovenox 40 bid,   denies having pain in right shoulder, has chronic AC dislocation, mult healed rib fractures  this was d/w in detail  ptn's daughter kp by dr garrett  ptn is DNR  pt    k + elevated but hemolyzed  repeat bmo

## 2022-01-09 LAB
ANION GAP SERPL CALC-SCNC: 12 MMOL/L — SIGNIFICANT CHANGE UP (ref 5–17)
BUN SERPL-MCNC: 22 MG/DL — SIGNIFICANT CHANGE UP (ref 7–23)
CALCIUM SERPL-MCNC: 9.3 MG/DL — SIGNIFICANT CHANGE UP (ref 8.4–10.5)
CHLORIDE SERPL-SCNC: 97 MMOL/L — SIGNIFICANT CHANGE UP (ref 96–108)
CO2 SERPL-SCNC: 26 MMOL/L — SIGNIFICANT CHANGE UP (ref 22–31)
CREAT SERPL-MCNC: 0.74 MG/DL — SIGNIFICANT CHANGE UP (ref 0.5–1.3)
GLUCOSE BLDC GLUCOMTR-MCNC: 189 MG/DL — HIGH (ref 70–99)
GLUCOSE SERPL-MCNC: 67 MG/DL — LOW (ref 70–99)
HCT VFR BLD CALC: 45.5 % — HIGH (ref 34.5–45)
HGB BLD-MCNC: 14.5 G/DL — SIGNIFICANT CHANGE UP (ref 11.5–15.5)
MCHC RBC-ENTMCNC: 29.2 PG — SIGNIFICANT CHANGE UP (ref 27–34)
MCHC RBC-ENTMCNC: 31.9 GM/DL — LOW (ref 32–36)
MCV RBC AUTO: 91.5 FL — SIGNIFICANT CHANGE UP (ref 80–100)
NRBC # BLD: 0 /100 WBCS — SIGNIFICANT CHANGE UP (ref 0–0)
PLATELET # BLD AUTO: 215 K/UL — SIGNIFICANT CHANGE UP (ref 150–400)
POTASSIUM SERPL-MCNC: 5.2 MMOL/L — SIGNIFICANT CHANGE UP (ref 3.5–5.3)
POTASSIUM SERPL-SCNC: 5.2 MMOL/L — SIGNIFICANT CHANGE UP (ref 3.5–5.3)
RBC # BLD: 4.97 M/UL — SIGNIFICANT CHANGE UP (ref 3.8–5.2)
RBC # FLD: 14 % — SIGNIFICANT CHANGE UP (ref 10.3–14.5)
SODIUM SERPL-SCNC: 135 MMOL/L — SIGNIFICANT CHANGE UP (ref 135–145)
WBC # BLD: 16.38 K/UL — HIGH (ref 3.8–10.5)
WBC # FLD AUTO: 16.38 K/UL — HIGH (ref 3.8–10.5)

## 2022-01-09 RX ADMIN — Medication 1200 MILLIGRAM(S): at 17:14

## 2022-01-09 RX ADMIN — Medication 1200 MILLIGRAM(S): at 06:11

## 2022-01-09 RX ADMIN — TIOTROPIUM BROMIDE 1 CAPSULE(S): 18 CAPSULE ORAL; RESPIRATORY (INHALATION) at 12:10

## 2022-01-09 RX ADMIN — ENOXAPARIN SODIUM 40 MILLIGRAM(S): 100 INJECTION SUBCUTANEOUS at 06:11

## 2022-01-09 RX ADMIN — AMLODIPINE BESYLATE 5 MILLIGRAM(S): 2.5 TABLET ORAL at 06:10

## 2022-01-09 RX ADMIN — BUDESONIDE AND FORMOTEROL FUMARATE DIHYDRATE 2 PUFF(S): 160; 4.5 AEROSOL RESPIRATORY (INHALATION) at 17:14

## 2022-01-09 RX ADMIN — Medication 6 MILLIGRAM(S): at 06:11

## 2022-01-09 RX ADMIN — BUDESONIDE AND FORMOTEROL FUMARATE DIHYDRATE 2 PUFF(S): 160; 4.5 AEROSOL RESPIRATORY (INHALATION) at 06:11

## 2022-01-09 NOTE — PROGRESS NOTE ADULT - SUBJECTIVE AND OBJECTIVE BOX
DATE OF SERVICE: 01-09-22 @ 14:05  CHIEF COMPLAINT:Patient is a 86y old  Female who presents with a chief complaint of   	        PAST MEDICAL & SURGICAL HISTORY:  No pertinent past medical history    No significant past surgical history          no new changes  no cp   breathin g  ok   on n/c o2  no vomiting      Medications:  MEDICATIONS  (STANDING):  amLODIPine   Tablet 5 milliGRAM(s) Oral daily  budesonide 160 MICROgram(s)/formoterol 4.5 MICROgram(s) Inhaler 2 Puff(s) Inhalation two times a day  enoxaparin Injectable 40 milliGRAM(s) SubCutaneous every 12 hours  guaiFENesin ER 1200 milliGRAM(s) Oral every 12 hours  influenza  Vaccine (HIGH DOSE) 0.7 milliLiter(s) IntraMuscular once  tiotropium 18 MICROgram(s) Capsule 1 Capsule(s) Inhalation daily    MEDICATIONS  (PRN):    	    PHYSICAL EXAM:  T(C): 36.6 (01-09-22 @ 09:33), Max: 36.8 (01-09-22 @ 00:44)  HR: 80 (01-09-22 @ 09:33) (67 - 80)  BP: 124/66 (01-09-22 @ 09:33) (104/71 - 153/75)  RR: 18 (01-09-22 @ 09:33) (18 - 18)  SpO2: 98% (01-09-22 @ 09:33) (96% - 98%)  Wt(kg): --  I&O's Summary    08 Jan 2022 07:01  -  09 Jan 2022 07:00  --------------------------------------------------------  IN: 440 mL / OUT: 1300 mL / NET: -860 mL        Appearance: Normal	  HEENT:   Normal oral mucosa, PERRL, EOMI	    Cardiovascular: Normal S1 S2, No JVD,  Respiratorydec b s   Gastrointestinal:  Soft, Non-tender, + BS	    Neurologic: Non-focal  Extremities: dec rm    TELEMETRY: 	    ECG:  	  RADIOLOGY:  OTHER: 	  	  LABS:	 	    CARDIAC MARKERS:                                14.5   16.38 )-----------( 215      ( 09 Jan 2022 07:00 )             45.5     01-09    135  |  97  |  22  ----------------------------<  67<L>  5.2   |  26  |  0.74    Ca    9.3      09 Jan 2022 07:00      proBNP:   Lipid Profile:   HgA1c:   TSH:

## 2022-01-09 NOTE — PROGRESS NOTE ADULT - SUBJECTIVE AND OBJECTIVE BOX
[facility] DIVISION of INFECTIOUS DISEASE  Sebastián Joshi MD PhD, Neida Gan MD, Della Law MD, Jimi Ceja MD, Abdulaziz Gordon MD  and providing coverage with Asuncion Nice MD and Migel Ordonez MD  Providing Infectious Disease Consultations at [facility], [facility], [facility]'s      Office# [phone] to schedule follow up appointments  Answering Service for urgent calls or New Consults [phone]  Cell# to text for urgent issues Sebastián Joshi [phone]     Infectious diseases progress note:    BILLY BERMUDEZ is a 86y y. o. Female patient    COVID Patient    Allergies    No Known Allergies    Intolerances        ANTIBIOTICS/RELEVANT:  antimicrobials    immunologic:  influenza  Vaccine (HIGH DOSE) 0.7 milliLiter(s) IntraMuscular once    OTHER:  amLODIPine   Tablet 5 milliGRAM(s) Oral daily  budesonide 160 MICROgram(s)/formoterol 4.5 MICROgram(s) Inhaler 2 Puff(s) Inhalation two times a day  enoxaparin Injectable 40 milliGRAM(s) SubCutaneous every 12 hours  guaiFENesin ER 1200 milliGRAM(s) Oral every 12 hours  tiotropium 18 MICROgram(s) Capsule 1 Capsule(s) Inhalation daily      Objective:  Vital Signs Last 24 Hrs  T(C): 36.6 (09 Jan 2022 09:33), Max: 36.8 (09 Jan 2022 00:44)  T(F): 97.8 (09 Jan 2022 09:33), Max: 98.3 (09 Jan 2022 00:44)  HR: 80 (09 Jan 2022 09:33) (67 - 80)  BP: 124/66 (09 Jan 2022 09:33) (104/71 - 153/75)  BP(mean): --  RR: 18 (09 Jan 2022 09:33) (18 - 18)  SpO2: 98% (09 Jan 2022 09:33) (96% - 98%)    T(C): 36.6 (01-09-22 @ 09:33), Max: 36.8 (01-09-22 @ 00:44)  T(C): 36.6 (01-09-22 @ 09:33), Max: 36.8 (01-09-22 @ 00:44)  T(C): 36.6 (01-09-22 @ 09:33), Max: 36.8 (01-09-22 @ 00:44)    PHYSICAL EXAM:  HEENT: NC atraumatic  Neck: supple  Respiratory: no accessory muscle use, breathing comfortably  Cardiovascular: distant  Gastrointestinal: normal appearing, nondistended  Extremities: no clubbing, no cyanosis,        LABS:                          14.5   16.38 )-----------( 215      ( 09 Jan 2022 07:00 )             45.5       WBC  16.38 01-09 @ 07:00  18.27 01-08 @ 10:44  14.60 01-07 @ 07:47  15.12 01-06 @ 09:32      01-09    135  |  97  |  22  ----------------------------<  67<L>  5.2   |  26  |  0.74    Ca    9.3      09 Jan 2022 07:00        Creatinine, Serum: 0.74 mg/dL (01-09-22 @ 07:00)  Creatinine, Serum: 0.88 mg/dL (01-08-22 @ 15:11)  Creatinine, Serum: 0.70 mg/dL (01-08-22 @ 10:44)  Creatinine, Serum: 0.75 mg/dL (01-06-22 @ 09:32)  Creatinine, Serum: 0.74 mg/dL (01-05-22 @ 11:36)  Creatinine, Serum: 0.68 mg/dL (01-04-22 @ 09:24)  Creatinine, Serum: 0.70 mg/dL (01-04-22 @ 09:24)  Creatinine, Serum: 0.62 mg/dL (01-03-22 @ 07:28)                COVID RISK SCORE  Auto Neutrophil #: 6.96 K/uL (12-27-21 @ 16:51)  Auto Lymphocyte #: 1.19 K/uL (12-27-21 @ 16:51)      Auto Eosinophil #: 0.01 K/uL (12-27-21 @ 16:51)    Lactate Dehydrogenase, Serum: 271 U/L (01-07-22 @ 07:37)  Lactate Dehydrogenase, Serum: 441 U/L (12-31-21 @ 12:14)  Lactate Dehydrogenase, Serum: 546 U/L (12-29-21 @ 09:27)      Procalcitonin, Serum: 0.17 ng/mL (12-27-21 @ 16:51)            Ferritin, Serum: 505 ng/mL (01-07-22 @ 09:14)  Ferritin, Serum: 569 ng/mL (01-04-22 @ 16:09)  Ferritin, Serum: 531 ng/mL (12-31-21 @ 13:51)  Ferritin, Serum: 717 ng/mL (12-29-21 @ 11:37)  Ferritin, Serum: 799 ng/mL (12-27-21 @ 23:22)        Activated Partial Thromboplastin Time: 26.3 sec (12-27-21 @ 16:51)  INR: 1.09 ratio (12-27-21 @ 16:51)    D-Dimer Assay, Quantitative: 825 ng/mL DDU (01-07-22 @ 07:47)  D-Dimer Assay, Quantitative: 1575 ng/mL DDU (01-04-22 @ 09:24)  D-Dimer Assay, Quantitative: 1109 ng/mL DDU (12-31-21 @ 12:14)  D-Dimer Assay, Quantitative: 4014 ng/mL DDU (12-29-21 @ 09:26)  D-Dimer Assay, Quantitative: 1014 ng/mL DDU (12-27-21 @ 19:30)  D-Dimer Assay, Quantitative: 861 ng/mL DDU (12-27-21 @ 16:51)        MICROBIOLOGY:              SARS-CoV-2: Detected (27 Dec 2021 16:49)      RADIOLOGY & ADDITIONAL STUDIES:

## 2022-01-09 NOTE — PROGRESS NOTE ADULT - ASSESSMENT
85 yo F with no reported PMHx, presents to the ED for generalized weakness for the past few days. Pt has also had subjective fevers and her daughter called 911. EMS found the pt to be hypoxic to low 80s on RA, placed on NRB. Here, pt improved to high 90s on 4L NC. She denies SOB, CP, dizziness, n/v/d, abd pain, leg swelling.    covid positive, admitted with acute hypoxic respiratory failure,   ID and Pulm f/u oted  chest ct neg for PNA on admission, and CTA chest neg for PE 12/30  pulm f/u noted  taper o2 as able  Symbicort and proventil HFA,   completed remdesivir, /steroids  neg dopplers for DVT,   Lovenox 40 bid,   denies having pain in right shoulder, has chronic AC dislocation, mult healed rib fractures  this was d/w in detail  ptn's daughter kp by dr garrett  ptn is DNR  pt    k + better  repeat bmo

## 2022-01-09 NOTE — PROGRESS NOTE ADULT - ASSESSMENT
85 yo F with no reported PMHx, presents to the ED for generalized weakness for the past few days. Pt has also had subjective fevers and her daughter called 911. EMS found the pt to be hypoxic to low 80s on RA, placed on NRB. Here, pt improved to high 90s on 4L NC. She denies SOB, CP, dizziness, n/v/d, abd pain, leg swelling.   Pt has not seen a doctor in over 6 years but pt has followed cardiology in the past, Dr. Elliot Caballero.   Daughter Daxa [phone] found to have COVID    Covid (+):  - Pt requiring supplemental O2.  CT chest with b/l groundglass densities c/w covid pna.    sp remdesivir   dexamethasone started 12/27      - Monitor off abx, do not suspect superimposed bacterial infection.    1/8 - NC 4L, today day 13 of steroids, cont LMWH  1/8 - NC 4L,  steroids - will dc, cont LMWH      Dr. Rodrigo Joshi covering 1/8 and 1/9.  [phone]    Starting Monday Dr Asuncion Nice will be re-assuming care of this patient so starting at that point please feel free to reach out to her with any questions [phone]

## 2022-01-10 LAB
ANION GAP SERPL CALC-SCNC: 13 MMOL/L — SIGNIFICANT CHANGE UP (ref 5–17)
BUN SERPL-MCNC: 28 MG/DL — HIGH (ref 7–23)
CALCIUM SERPL-MCNC: 9.3 MG/DL — SIGNIFICANT CHANGE UP (ref 8.4–10.5)
CHLORIDE SERPL-SCNC: 97 MMOL/L — SIGNIFICANT CHANGE UP (ref 96–108)
CO2 SERPL-SCNC: 24 MMOL/L — SIGNIFICANT CHANGE UP (ref 22–31)
CREAT SERPL-MCNC: 0.82 MG/DL — SIGNIFICANT CHANGE UP (ref 0.5–1.3)
CRP SERPL-MCNC: <3 MG/L — SIGNIFICANT CHANGE UP (ref 0–4)
FERRITIN SERPL-MCNC: 569 NG/ML — HIGH (ref 15–150)
GLUCOSE SERPL-MCNC: 68 MG/DL — LOW (ref 70–99)
HCT VFR BLD CALC: 44.7 % — SIGNIFICANT CHANGE UP (ref 34.5–45)
HGB BLD-MCNC: 14.2 G/DL — SIGNIFICANT CHANGE UP (ref 11.5–15.5)
LDH SERPL L TO P-CCNC: 282 U/L — HIGH (ref 50–242)
MCHC RBC-ENTMCNC: 28.9 PG — SIGNIFICANT CHANGE UP (ref 27–34)
MCHC RBC-ENTMCNC: 31.8 GM/DL — LOW (ref 32–36)
MCV RBC AUTO: 91 FL — SIGNIFICANT CHANGE UP (ref 80–100)
NRBC # BLD: 0 /100 WBCS — SIGNIFICANT CHANGE UP (ref 0–0)
PLATELET # BLD AUTO: 215 K/UL — SIGNIFICANT CHANGE UP (ref 150–400)
POTASSIUM SERPL-MCNC: 5.2 MMOL/L — SIGNIFICANT CHANGE UP (ref 3.5–5.3)
POTASSIUM SERPL-SCNC: 5.2 MMOL/L — SIGNIFICANT CHANGE UP (ref 3.5–5.3)
RBC # BLD: 4.91 M/UL — SIGNIFICANT CHANGE UP (ref 3.8–5.2)
RBC # FLD: 13.9 % — SIGNIFICANT CHANGE UP (ref 10.3–14.5)
SARS-COV-2 RNA SPEC QL NAA+PROBE: DETECTED
SODIUM SERPL-SCNC: 134 MMOL/L — LOW (ref 135–145)
WBC # BLD: 21.15 K/UL — HIGH (ref 3.8–10.5)
WBC # FLD AUTO: 21.15 K/UL — HIGH (ref 3.8–10.5)

## 2022-01-10 PROCEDURE — 71045 X-RAY EXAM CHEST 1 VIEW: CPT | Mod: 26

## 2022-01-10 RX ADMIN — BUDESONIDE AND FORMOTEROL FUMARATE DIHYDRATE 2 PUFF(S): 160; 4.5 AEROSOL RESPIRATORY (INHALATION) at 05:20

## 2022-01-10 RX ADMIN — AMLODIPINE BESYLATE 5 MILLIGRAM(S): 2.5 TABLET ORAL at 05:20

## 2022-01-10 NOTE — PROGRESS NOTE ADULT - SUBJECTIVE AND OBJECTIVE BOX
Infectious Diseases progress note:    Subjective:    ROS:  CONSTITUTIONAL:  No fever, chills, rigors  CARDIOVASCULAR:  No chest pain or palpitations  RESPIRATORY:   No SOB, cough, dyspnea on exertion.  No wheezing  GASTROINTESTINAL:  No abd pain, N/V, diarrhea/constipation  EXTREMITIES:  No swelling or joint pain  GENITOURINARY:  No burning on urination, increased frequency or urgency.  No flank pain  NEUROLOGIC:  No HA, visual disturbances  SKIN: No rashes    Allergies    No Known Allergies    Intolerances        ANTIBIOTICS/RELEVANT:  antimicrobials    immunologic:  influenza  Vaccine (HIGH DOSE) 0.7 milliLiter(s) IntraMuscular once    OTHER:  amLODIPine   Tablet 5 milliGRAM(s) Oral daily  enoxaparin Injectable 40 milliGRAM(s) SubCutaneous every 12 hours      Objective:  Vital Signs Last 24 Hrs  T(C): 36.4 (11 Jan 2022 00:00), Max: 36.7 (10 Darwin 2022 21:19)  T(F): 97.6 (11 Jan 2022 00:00), Max: 98.1 (10 Darwin 2022 21:19)  HR: 75 (11 Jan 2022 00:00) (56 - 82)  BP: 116/69 (11 Jan 2022 00:00) (110/65 - 137/76)  BP(mean): --  RR: 18 (11 Jan 2022 00:00) (18 - 18)  SpO2: 93% (11 Jan 2022 00:00) (91% - 97%)    PHYSICAL EXAM:  Constitutional:NAD  Eyes:ALEXEY, EOMI  Ear/Nose/Throat: no thrush, mucositis.  Moist mucous membranes	  Neck:no JVD, no lymphadenopathy, supple  Respiratory: CTA ole  Cardiovascular: S1S2 RRR, no murmurs  Gastrointestinal:soft, nontender,  nondistended (+) BS  Extremities:no e/e/c  Skin:  no rashes, open wounds or ulcerations        LABS:                        14.2   21.15 )-----------( 215      ( 10 Darwin 2022 07:01 )             44.7     01-10    134<L>  |  97  |  28<H>  ----------------------------<  68<L>  5.2   |  24  |  0.82    Ca    9.3      10 Darwin 2022 06:58            Procalcitonin, Serum: 0.17 (12-27 @ 16:51)            Rapid RVP Result: Detected          MICROBIOLOGY:          RADIOLOGY & ADDITIONAL STUDIES:

## 2022-01-10 NOTE — PROGRESS NOTE ADULT - SUBJECTIVE AND OBJECTIVE BOX
[facility] - PROGRESS NOTE    CHIEF COMPLAINT: COVID-19 related pneumonia; acute hypoxic respiratory failure; bronchospasm; obesity;     INTERVAL HISTORY: CTA 12/30 - no pulmonary embolism -  increasing diffuse bilateral peripheral and peribronchial vascular groundglass opacities c/w worsening COVID pneumonia; states that her daughter is picking her up today to go home; more awake and alert; waiting for breakfast; no shortness of breath or hypoxemia on a 4lpm nasal canula - oxygen has not been tapered over the weekend; no cough, sputum production, hemoptysis, chest congestion or wheeze; no fevers, chills or sweats; no chest pain/pressure or palpitations; has been out of bed occasionally; difficulty walking    REVIEW OF SYSTEMS:  Constitutional: As per interval history  HEENT: Within normal limits  CV: As per interval history  Resp: As per interval history  GI: Within normal limits   : Within normal limits  Musculoskeletal: shoulder arthritis - dislocated right shoulder  Skin: Within normal limits  Neurological: Within normal limits  Psychiatric: Within normal limits  Endocrine: Within normal limits  Hematologic/Lymphatic: Within normal limits  Allergic/Immunologic: Within normal limits    MEDICATIONS:     Pulmonary "  budesonide 160 MICROgram(s)/formoterol 4.5 MICROgram(s) Inhaler 2 Puff(s) Inhalation two times a day  guaiFENesin ER 1200 milliGRAM(s) Oral every 12 hours  tiotropium 18 MICROgram(s) Capsule 1 Capsule(s) Inhalation daily    Anti-microbials:    Cardiovascular:  amLODIPine   Tablet 5 milliGRAM(s) Oral daily    Other:  enoxaparin Injectable 40 milliGRAM(s) SubCutaneous every 12 hours  influenza  Vaccine (HIGH DOSE) 0.7 milliLiter(s) IntraMuscular once    MEDICATIONS  (PRN):    OBJECTIVE:    I&O's Detail    09 Jan 2022 07:01  -  10 Darwin 2022 07:00  --------------------------------------------------------  IN:    Oral Fluid: 360 mL  Total IN: 360 mL    OUT:    Voided (mL): 1200 mL  Total OUT: 1200 mL    Total NET: -840 mL    POCT Blood Glucose.: 189 mg/dL (09 Jan 2022 10:01)    PHYSICAL EXAM:       ICU Vital Signs Last 24 Hrs  T(C): 36.2 (10 Darwin 2022 05:20), Max: 36.8 (09 Jan 2022 16:37)  T(F): 97.1 (10 Darwin 2022 05:20), Max: 98.2 (09 Jan 2022 16:37)  HR: 56 (10 Darwin 2022 05:20) (56 - 80)  BP: 116/63 (10 Darwin 2022 05:20) (116/63 - 134/83)  BP(mean): --  ABP: --  ABP(mean): --  RR: 18 (10 Darwin 2022 05:20) (18 - 18)  SpO2: 97% (10 Darwin 2022 05:20) (97% - 98%) on 4lpm nasal canula     General: Awake. Alert. Cooperative. No distress. Appears stated age. Obese. In bed.  HEENT: Atraumatic. Normocephalic. Anicteric. Normal oral mucosa. PERRL. EOMI.  Neck: Supple. Trachea midline. Thyroid without enlargement/tenderness/nodules. No carotid bruit. No JVD.	  Cardiovascular: Regular rate and rhythm. S1 S2 normal. II/VI systolic murmur.  Respiratory: Respirations unlabored. Scant rales. No wheeze. Kyphosis.  Abdomen: Soft. Non-tender. Non-distended. No organomegaly. No masses. Normal bowel sounds. Obese.  Extremities: Warm to touch. No clubbing or cyanosis. No pedal edema.  Pulses: 2+ peripheral pulses all extremities.	  Skin: Keratotic lesions on the legs.  Lymph Nodes: Cervical, supraclavicular and axillary nodes normal  Neurological: Motor and sensory examination equal and normal. A and O x 3  Psychiatry: Appropriate mood and affect.    LABS:                          14.2   21.15 )-----------( 215      ( 10 Darwin 2022 07:01 )             44.7     CBC    WBC  21.15 <==, 16.38 <==, 18.27 <==, 14.60 <==, 15.12 <==    Hemoglobin  14.2 <<==, 14.5 <<==, 14.0 <<==, 13.8 <<==, 14.5 <<==    Hematocrit  44.7 <==, 45.5 <==, 44.4 <==, 43.9 <==, 46.0 <==    Platelets  215 <==, 215 <==, 272 <==, 262 <==, 238 <==      135  |  97  |  22  ----------------------------<  67<L>    01-09  5.2   |  26  |  0.74      LYTES    sodium  135 <==, 134 <==, 133 <==, 133 <==, 138 <==    potassium   5.2 <==, 4.5 <==, 6.1 <==, 4.6 <==, 4.6 <==    chloride  97 <==, 96 <==, 96 <==, 100 <==, 102 <==    carbon dioxide  26 <==, 26 <==, 23 <==, 23 <==, 27 <==    =============================================================================================  RENAL FUNCTION:    Creatinine:   0.74  <<==, 0.88  <<==, 0.70  <<==, 0.75  <<==, 0.74  <<==, 0.68  <<==, 0.62  <<==    BUN:   22 <==, 24 <==, 22 <==, 24 <==, 27 <==    ============================================================================================    calcium   9.3 <==, 9.0 <==, 8.9 <==, 8.5 <==, 8.6 <==    mag   2.0 <==    ============================================================================================  LFTs    AST:   16 <== , 21 <== , 10 <== , 18 <==     ALT:  18  <== , 18  <== , 10  <== , 10  <==     AP:  72  <=, 70  <=, 66  <=, 69  <=    Bili:  0.3  <=, 0.3  <=, 0.4  <=, 0.4  <=    D-Dimer Assay, Quantitative (01.07.22 @ 07:47)   D-Dimer Assay, Quantitative: 825  D-Dimer Assay, Quantitative (01.04.22 @ 09:24)   D-Dimer Assay, Quantitative: 1575  D-Dimer Assay, Quantitative (12.31.21 @ 12:14)   D-Dimer Assay, Quantitative: 1109  D-Dimer Assay, Quantitative (12.29.21 @ 09:26)   D-Dimer Assay, Quantitative: 4014  D-Dimer Assay, Quantitative (12.27.21 @ 19:30)   D-Dimer Assay, Quantitative: 1014  D-Dimer Assay, Quantitative (12.27.21 @ 16:51)   D-Dimer Assay, Quantitative: 861    C-Reactive Protein, Serum (01.07.22 @ 07:37)   C-Reactive Protein, Serum: 4 mg/L   C-Reactive Protein, Serum (01.04.22 @ 09:24)   C-Reactive Protein, Serum: 11 mg/L   C-Reactive Protein, Serum (12.27.21 @ 16:51)   C-Reactive Protein, Serum: 133 mg/L     Ferritin, Serum in AM (01.07.22 @ 09:14)   Ferritin, Serum: 505 ng/mL   Ferritin, Serum in AM (01.04.22 @ 16:09)   Ferritin, Serum: 569 ng/mL   Ferritin, Serum in AM (12.31.21 @ 13:51)   Ferritin, Serum: 531 ng/mL   Ferritin, Serum in AM (12.29.21 @ 11:37)   Ferritin, Serum: 717 ng/mL   Ferritin, Serum (12.27.21 @ 23:22)   Ferritin, Serum: 799 ng/mL   MICROBIOLOGY:     Respiratory Viral Panel with COVID-19 by MONTY (12.27.21 @ 16:49)   Rapid RVP Result: Detected   SARS-CoV-2: Detected:    RADIOLOGY:  [x ] Chest radiographs reviewed and interpreted by me    < from: Xray Chest 1 View- PORTABLE-Urgent (Xray Chest 1 View- PORTABLE-Urgent .) (01.04.22 @ 10:26) >    EXAM:  XR CHEST PORTABLE URGENT 1V                          PROCEDURE DATE:  01/04/2022      FINDINGS:    There are unchanged patchy opacities predominantly in the bilateral   mid/lower lungs.    No pleural effusion.    Heart size is without change.    Severe degenerative changes of the bilateral glenohumeral joints with   dislocated right humeral head. Rib deformities are again identified.      IMPRESSION:    Unchanged patchy opacities as above.    FLAVIO ACOSTA MD; Resident Radiologist  This document has been electronically signed.  CANDIDO GARCIA MD; Attending Radiologist  This document has been electronically signed. Jan 4 2022  3:54PM  ---------------------------------------------------------------------------------------------------------------  [facility]                          PROCEDURE DATE:  12/30/2021      INTERPRETATION:  CLINICAL INFORMATION: Hypoxia and elevated d-dimer.   Admitted with Covid 19 infection.    FINDINGS:    LUNGS AND AIRWAYS: Trachea is deviated to the right. Tracheobronchial   secretions. Diffuse bilateral peripheral and peribronchial vascular   groundglass opacities, increased when compared with 12/27/2021.  PLEURA: No pleural effusion.  MEDIASTINUM AND JAMILAH: No lymphadenopathy.  VESSELS: No pulmonary embolism. Aortic and coronary artery calcifications.  HEART: Heart size is normal. No pericardial effusion.  CHEST WALL AND LOWER NECK: 1.1 cm left medial breast subcutaneous nodule,   probably a sebaceous cyst. Thyroid is unremarkable.  VISUALIZED UPPER ABDOMEN: Moderate hiatal hernia. 1.1 cm left adrenal   adenoma. 3.4 cm isodense round lesion adjacent to stomach (1, 108)  BONES: Chronic bilateral rib fractures. Inferior right shoulder   dislocation. 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   Multilevel degenerative changes of the thoracic spine. Dextroscoliotic   thoracic curvature.    IMPRESSION:  No pulmonary embolism.    Increased bilateral groundglass opacities, consistent with known Covid 19   infection.    3.4 cm isodense round lesion adjacent to stomach. 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 CT abdomen/pelvis with contrast recommended   for complete evaluation.    PAM LEIAV MD; Resident Radiology  This document has been electronically signed.  OPAL GUZMAN MD; Attending Radiologist  This document has been electronically signed. Dec [id number]  9:43AM  ---------------------------------------------------------------------------------------------------------------  < from: CT Chest No Cont (12.27.21 @ 18:58) >    EXAM:  CT CHEST                          PROCEDURE DATE:  12/27/2021      INTERPRETATION:  CLINICAL INFORMATION: Hypoxia    FINDINGS:    Lungs/Airways/Pleura: There are ill defined peripheral and   peribronchovascular predominant ground glass densities throughout both   lungs. The airways are patent. No pleural effusion.    Mediastinum/Lymph nodes: No thoracic adenopathy. There is a moderate   hiatal hernia.    Heart and Vessels: The cardiac chambers are normal in size. There are   mild coronary artery calcifications. No pericardial effusion. The aorta   is normal in caliber.    Upper Abdomen: Unremarkable.    Osseous structures and Soft Tissues: There are old/healing bilateral rib   fractures. No aggressive bone lesions. There is an inferior right   shoulder dislocation. There are bilateral glenohumeral joint effusions.    IMPRESSION:    1.  Lung findings typically seen in COVID infection. Other infections are   also in the differential    2.  Moderate hiatal hernia    3.  Inferior right shoulder dislocation    AMANDA MCGUIRE M.D., Attending Radiologist  This document has been electronically signed. Dec 28 2021  9:54AM  --------------------------------------------------------------------------------------------------------------  EXAM:  XR CHEST PORTABLE URGENT 1V                          PROCEDURE DATE:  12/27/2021      INTERPRETATION:  CLINICAL INFORMATION: Shortness of breath, cough and   fever    TECHNIQUE: AP view(s) of the chest.    COMPARISON: No comparison available.    FINDINGS:  The cardiac silhouette is normal in size. Bilateral lower lung patchy   airspace opacities. No pleural effusion or pneumothorax. No acute bony   abnormality.    IMPRESSION:  Bilateral lower lung patchy airspace opacities.    NICK DENT DO; Resident Radiologist  This document has been electronically signed.  CANDIDO GARCIA MD; Attending Radiologist  This document has been electronically signed. Dec 28 2021 12:06AM    ---------------------------------------------------------------------------------------------------------------  < from: Xray Shoulder 2 Views, Right (12.29.21 @ 17:30) >    EXAM:  XR SHOULDER COMP MIN 2V RT                          PROCEDURE DATE:  12/29/2021      INTERPRETATION:  CLINICAL INDICATION: radiographic correlation of   dislocated right shoulder detected on recent CT    EXAM:  AP and transscapular Y right shoulder from 12/29/2021 at 1730. Reviewed   in conjunction with chest CT from 12/27/2021.    IMPRESSION:  Redemonstrated anteriorly dislocated right shoulder, likely chronic. AC   joint alignment maintained.    Chronic ossific debris adjacent to superior AC joint margin either from   old trauma or degenerative etiology. Degenerative spurring apparent along   the uncovered glenoid and humeral head articular margins.    Multiple old upper right rib fracture deformities also apparent.    No acute appearing fractures.    Generalized osteopenia otherwise no discrete lytic or blastic lesions.    SANTHOSH NGUYEN MD; Attending Radiologist  This document has been electronically signed. Dec 29 2021  5:37PM    ---------------------------------------------------------------------------------------------------------------

## 2022-01-10 NOTE — PROGRESS NOTE ADULT - ASSESSMENT
87 yo F with no reported PMHx, presents to the ED for generalized weakness for the past few days. Pt has also had subjective fevers and her daughter called 911. EMS found the pt to be hypoxic to low 80s on RA, placed on NRB. Here, pt improved to high 90s on 4L NC. She denies SOB, CP, dizziness, n/v/d, abd pain, leg swelling.   Pt has not seen a doctor in over 6 years but pt has followed cardiology in the past, Dr. Elliot Caballero.   Daughter Daxa [phone] found to have COVID    Covid (+):  - Pt initally requiring supplemental O2 CT chest with b/l groundglass densities c/w covid pna.    sp remdesivir x 5 days  dexamethasone started 12/27 --> 1/9.  s/p completion of 10 days    - Monitor off abx, do not suspect superimposed bacterial infection.    - Pt weaned to RA.    d/c planning.    Asuncion [facility]  [phone]

## 2022-01-10 NOTE — PROGRESS NOTE ADULT - SUBJECTIVE AND OBJECTIVE BOX
DATE OF SERVICE: 01-10-22 @ 11:14  CHIEF COMPLAINT:Patient is a 86y old  Female who presents with a chief complaint of   	        PAST MEDICAL & SURGICAL HISTORY:  No pertinent past medical history    No significant past surgical history            rigo  RESPIRATORY: breathinig ok  CARDIOVASCULAR: No chest pain, palpitations, passing out,   GASTROINTESTINAL: No abdominal or epigastric pain.   GENITOURINARY: No dysuria, frequency, hematuria,  NEUROLOGICAL: No headaches,     Medications:  MEDICATIONS  (STANDING):  amLODIPine   Tablet 5 milliGRAM(s) Oral daily  enoxaparin Injectable 40 milliGRAM(s) SubCutaneous every 12 hours  influenza  Vaccine (HIGH DOSE) 0.7 milliLiter(s) IntraMuscular once    MEDICATIONS  (PRN):    	    PHYSICAL EXAM:  T(C): 36.3 (01-10-22 @ 09:06), Max: 36.8 (01-09-22 @ 16:37)  HR: 74 (01-10-22 @ 09:06) (56 - 74)  BP: 115/64 (01-10-22 @ 09:06) (115/64 - 134/83)  RR: 18 (01-10-22 @ 09:06) (18 - 18)  SpO2: 91% (01-10-22 @ 09:15) (91% - 98%)  Wt(kg): --  I&O's Summary    09 Jan 2022 07:01  -  10 Darwin 2022 07:00  --------------------------------------------------------  IN: 360 mL / OUT: 1200 mL / NET: -840 mL        Appearance: Normal	  HEENT:   Normal oral mucosa, PERRL, EOMI	    Cardiovascular: Normal S1 S2, No JVD,   Respiratory: de c  bs   Psychiatry: A & O   Gastrointestinal:  Soft, Non-tender, + BS	    Neurologic: Non-focal  Extremities: dec rom     TELEMETRY: 	    ECG:  	  RADIOLOGY:  OTHER: 	  	  LABS:	 	    CARDIAC MARKERS:                                14.2   21.15 )-----------( 215      ( 10 Darwin 2022 07:01 )             44.7     01-10    134<L>  |  97  |  28<H>  ----------------------------<  68<L>  5.2   |  24  |  0.82    Ca    9.3      10 Darwin 2022 06:58      proBNP:   Lipid Profile:   HgA1c:   TSH:

## 2022-01-10 NOTE — PROGRESS NOTE ADULT - ASSESSMENT
ASSESSMENT:    unvaccinated woman without significant past medical/surgical history admitted with COVID-19 infection complicated by acute hypoxic respiratory failure due to extensive pneumonia, bronchospasm and obesity related restrictive lung disease - CTA chest 12/30 reveals bilateral multifocal peripheral ground glass opacities c/w COVID related pneumonia - no evidence of pulmonary edema or pleural effusions especially in the setting of a non-elevated pro-BNP level - no evidence of a superimposed bacterial infection in the absence of an elevated procalcitonin level - no evidence of pulmonary embolism - suspect the leukocytosis is due to high dose steroids rather than infection    PLAN/RECOMMENDATIONS:    airborne and contact isolation  follow-up CXR 1/4 -> no change in patchy opacities predominantly in the bilateral mid/lower lungs -> repeat today  oxygen supplementation to keep saturation greater than 92% - currently on a humidified nasal canula @ 4lpm -> taper to 2lpm  the patient will likely need home oxygen until her COVID pneumonia resolves - check room air saturation at rest and with exertion - check oxygen saturation with ambulation on 3lpm  observe off antibacterial antibiotics and diuretics  discontinue symbicort/spiriva  discontinue mucinex 1200mg 2 times daily  has completed a 5 day course of remdesivir - renal and liver function are stable  has completed a 10+ day course of decadron 6mg IV - following finger stick glucoses and treating hyperglycemia   diligent DVT prophylaxis - SQ lovenox 40mg 2 times daily  following inflammatory markers - ferritin (moderately elevated) - d-dimer (moderately elevated but decreasing) - CRP (minimally elevated)  not a candidate for monoclonal antibodies on oxygen in the inpatient setting  consider orthopedic evaluation for right shoulder dislocation  should receive the COVID vaccination 30 days from discharge  out of bed and into the chair - physical therapy     Will follow with you. Plan of care discussed with the patient and her at bedside. Patient is DNR. No pulmonary objection to discharge with appropriate oxygen supplies    Smith Robb MD, [facility]  [phone]  Pulmonary Medicine

## 2022-01-10 NOTE — PROGRESS NOTE ADULT - ASSESSMENT
87 yo F with no reported PMHx, presents to the ED for generalized weakness for the past few days. Pt has also had subjective fevers and her daughter called 911. EMS found the pt to be hypoxic to low 80s on RA, placed on NRB. Here, pt improved to high 90s on 4L NC. She denies SOB, CP, dizziness, n/v/d, abd pain, leg swelling.    covid positive, admitted with acute hypoxic respiratory failure,   ID and Pulm f/u oted  chest ct neg for PNA on admission, and CTA chest neg for PE 12/30  pulm f/u noted  taper o2 as able  Symbicort and proventil HFA,   completed remdesivir, /steroids  neg dopplers for DVT,   Lovenox 40 bid,   denies having pain in right shoulder, has chronic AC dislocation, mult healed rib fractures  this was d/w in detail  ptn's daughter kp by dr garrett  ptn is DNR  pt    k + better  repeat bmp

## 2022-01-11 ENCOUNTER — TRANSCRIPTION ENCOUNTER (OUTPATIENT)
Age: 87
End: 2022-01-11

## 2022-01-11 VITALS
SYSTOLIC BLOOD PRESSURE: 136 MMHG | TEMPERATURE: 98 F | RESPIRATION RATE: 18 BRPM | OXYGEN SATURATION: 92 % | HEART RATE: 92 BPM | DIASTOLIC BLOOD PRESSURE: 78 MMHG

## 2022-01-11 LAB
HCT VFR BLD CALC: 42.1 % — SIGNIFICANT CHANGE UP (ref 34.5–45)
HGB BLD-MCNC: 13.4 G/DL — SIGNIFICANT CHANGE UP (ref 11.5–15.5)
MCHC RBC-ENTMCNC: 29.2 PG — SIGNIFICANT CHANGE UP (ref 27–34)
MCHC RBC-ENTMCNC: 31.8 GM/DL — LOW (ref 32–36)
MCV RBC AUTO: 91.7 FL — SIGNIFICANT CHANGE UP (ref 80–100)
NRBC # BLD: 0 /100 WBCS — SIGNIFICANT CHANGE UP (ref 0–0)
PLATELET # BLD AUTO: 204 K/UL — SIGNIFICANT CHANGE UP (ref 150–400)
RBC # BLD: 4.59 M/UL — SIGNIFICANT CHANGE UP (ref 3.8–5.2)
RBC # FLD: 14.1 % — SIGNIFICANT CHANGE UP (ref 10.3–14.5)
WBC # BLD: 17.93 K/UL — HIGH (ref 3.8–10.5)
WBC # FLD AUTO: 17.93 K/UL — HIGH (ref 3.8–10.5)

## 2022-01-11 PROCEDURE — 93970 EXTREMITY STUDY: CPT

## 2022-01-11 PROCEDURE — 85025 COMPLETE CBC W/AUTO DIFF WBC: CPT

## 2022-01-11 PROCEDURE — 73030 X-RAY EXAM OF SHOULDER: CPT

## 2022-01-11 PROCEDURE — 71250 CT THORAX DX C-: CPT

## 2022-01-11 PROCEDURE — 94640 AIRWAY INHALATION TREATMENT: CPT

## 2022-01-11 PROCEDURE — 83605 ASSAY OF LACTIC ACID: CPT

## 2022-01-11 PROCEDURE — 86140 C-REACTIVE PROTEIN: CPT

## 2022-01-11 PROCEDURE — 80076 HEPATIC FUNCTION PANEL: CPT

## 2022-01-11 PROCEDURE — 83036 HEMOGLOBIN GLYCOSYLATED A1C: CPT

## 2022-01-11 PROCEDURE — 71275 CT ANGIOGRAPHY CHEST: CPT

## 2022-01-11 PROCEDURE — 84484 ASSAY OF TROPONIN QUANT: CPT

## 2022-01-11 PROCEDURE — 97530 THERAPEUTIC ACTIVITIES: CPT

## 2022-01-11 PROCEDURE — 82565 ASSAY OF CREATININE: CPT

## 2022-01-11 PROCEDURE — 84295 ASSAY OF SERUM SODIUM: CPT

## 2022-01-11 PROCEDURE — 85018 HEMOGLOBIN: CPT

## 2022-01-11 PROCEDURE — 96374 THER/PROPH/DIAG INJ IV PUSH: CPT

## 2022-01-11 PROCEDURE — 71045 X-RAY EXAM CHEST 1 VIEW: CPT

## 2022-01-11 PROCEDURE — 85610 PROTHROMBIN TIME: CPT

## 2022-01-11 PROCEDURE — 97110 THERAPEUTIC EXERCISES: CPT

## 2022-01-11 PROCEDURE — 85730 THROMBOPLASTIN TIME PARTIAL: CPT

## 2022-01-11 PROCEDURE — U0003: CPT

## 2022-01-11 PROCEDURE — 82330 ASSAY OF CALCIUM: CPT

## 2022-01-11 PROCEDURE — 0225U NFCT DS DNA&RNA 21 SARSCOV2: CPT

## 2022-01-11 PROCEDURE — 83880 ASSAY OF NATRIURETIC PEPTIDE: CPT

## 2022-01-11 PROCEDURE — 82728 ASSAY OF FERRITIN: CPT

## 2022-01-11 PROCEDURE — 80053 COMPREHEN METABOLIC PANEL: CPT

## 2022-01-11 PROCEDURE — 36415 COLL VENOUS BLD VENIPUNCTURE: CPT

## 2022-01-11 PROCEDURE — 99285 EMERGENCY DEPT VISIT HI MDM: CPT | Mod: 25

## 2022-01-11 PROCEDURE — 84132 ASSAY OF SERUM POTASSIUM: CPT

## 2022-01-11 PROCEDURE — 82435 ASSAY OF BLOOD CHLORIDE: CPT

## 2022-01-11 PROCEDURE — 82947 ASSAY GLUCOSE BLOOD QUANT: CPT

## 2022-01-11 PROCEDURE — 96375 TX/PRO/DX INJ NEW DRUG ADDON: CPT

## 2022-01-11 PROCEDURE — 85379 FIBRIN DEGRADATION QUANT: CPT

## 2022-01-11 PROCEDURE — 84145 PROCALCITONIN (PCT): CPT

## 2022-01-11 PROCEDURE — 82803 BLOOD GASES ANY COMBINATION: CPT

## 2022-01-11 PROCEDURE — 84443 ASSAY THYROID STIM HORMONE: CPT

## 2022-01-11 PROCEDURE — 80048 BASIC METABOLIC PNL TOTAL CA: CPT

## 2022-01-11 PROCEDURE — 82962 GLUCOSE BLOOD TEST: CPT

## 2022-01-11 PROCEDURE — 85027 COMPLETE CBC AUTOMATED: CPT

## 2022-01-11 PROCEDURE — 85014 HEMATOCRIT: CPT

## 2022-01-11 PROCEDURE — U0005: CPT

## 2022-01-11 PROCEDURE — 83615 LACTATE (LD) (LDH) ENZYME: CPT

## 2022-01-11 PROCEDURE — 83735 ASSAY OF MAGNESIUM: CPT

## 2022-01-11 RX ORDER — ACETAMINOPHEN 500 MG
0 TABLET ORAL
Qty: 0 | Refills: 0 | DISCHARGE

## 2022-01-11 RX ORDER — RIVAROXABAN 15 MG-20MG
1 KIT ORAL
Qty: 30 | Refills: 0
Start: 2022-01-11 | End: 2022-02-09

## 2022-01-11 RX ORDER — AMLODIPINE BESYLATE 2.5 MG/1
1 TABLET ORAL
Qty: 0 | Refills: 0 | DISCHARGE
Start: 2022-01-11

## 2022-01-11 RX ADMIN — AMLODIPINE BESYLATE 5 MILLIGRAM(S): 2.5 TABLET ORAL at 06:48

## 2022-01-11 RX ADMIN — ENOXAPARIN SODIUM 40 MILLIGRAM(S): 100 INJECTION SUBCUTANEOUS at 06:48

## 2022-01-11 NOTE — DISCHARGE NOTE NURSING/CASE MANAGEMENT/SOCIAL WORK - PATIENT PORTAL LINK FT
You can access the FollowMyHealth Patient Portal offered by [facility] by registering at the following website: [email or web address]. By joining [facility]’s FollowMyHealth portal, you will also be able to view your health information using other applications (apps) compatible with our system.

## 2022-01-11 NOTE — PROGRESS NOTE ADULT - SUBJECTIVE AND OBJECTIVE BOX
Infectious Diseases progress note:    Subjective:  NAD, afebrile. Satting well on RA.  d/c planning to rehab    ROS:  CONSTITUTIONAL:  No fever, chills, rigors  CARDIOVASCULAR:  No chest pain or palpitations  RESPIRATORY:   No SOB, cough, dyspnea on exertion.  No wheezing  GASTROINTESTINAL:  No abd pain, N/V, diarrhea/constipation  EXTREMITIES:  No swelling or joint pain  GENITOURINARY:  No burning on urination, increased frequency or urgency.  No flank pain  NEUROLOGIC:  No HA, visual disturbances  SKIN: No rashes    Allergies    No Known Allergies    Intolerances        ANTIBIOTICS/RELEVANT:  antimicrobials    immunologic:  influenza  Vaccine (HIGH DOSE) 0.7 milliLiter(s) IntraMuscular once    OTHER:  amLODIPine   Tablet 5 milliGRAM(s) Oral daily  enoxaparin Injectable 40 milliGRAM(s) SubCutaneous every 12 hours      Objective:  Vital Signs Last 24 Hrs  T(C): 36.6 (11 Jan 2022 10:57), Max: 36.7 (10 Darwin 2022 21:19)  T(F): 97.8 (11 Jan 2022 10:57), Max: 98.1 (10 Darwin 2022 21:19)  HR: 85 (11 Jan 2022 10:57) (67 - 85)  BP: 104/50 (11 Jan 2022 10:57) (104/50 - 146/83)  BP(mean): --  RR: 18 (11 Jan 2022 10:57) (18 - 18)  SpO2: 94% (11 Jan 2022 10:57) (92% - 94%)    PHYSICAL EXAM:  Constitutional:NAD  Eyes:ALEXEY, EOMI  Ear/Nose/Throat: no thrush, mucositis.  Moist mucous membranes	  Neck:no JVD, no lymphadenopathy, supple  Respiratory: CTA ole  Cardiovascular: S1S2 RRR, no murmurs  Gastrointestinal:soft, nontender,  nondistended (+) BS  Extremities:no e/e/c  Skin:  no rashes, open wounds or ulcerations        LABS:                        13.4   17.93 )-----------( 204      ( 11 Jan 2022 08:01 )             42.1     01-10    134<L>  |  97  |  28<H>  ----------------------------<  68<L>  5.2   |  24  |  0.82    Ca    9.3      10 Darwin 2022 06:58            Procalcitonin, Serum: 0.17 (12-27 @ 16:51)            Rapid RVP Result: Detected          MICROBIOLOGY:          RADIOLOGY & ADDITIONAL STUDIES:

## 2022-01-11 NOTE — PROGRESS NOTE ADULT - ASSESSMENT
ASSESSMENT:    unvaccinated woman without significant past medical/surgical history admitted with COVID-19 infection complicated by acute hypoxic respiratory failure due to extensive pneumonia, bronchospasm and obesity related restrictive lung disease - CTA chest 12/30 reveals bilateral multifocal peripheral ground glass opacities c/w COVID related pneumonia - no evidence of pulmonary edema or pleural effusions especially in the setting of a non-elevated pro-BNP level - no evidence of a superimposed bacterial infection in the absence of an elevated procalcitonin level - no evidence of pulmonary embolism - suspect the leukocytosis is due to high dose steroids rather than infection    1/11 - improved respiratory status and chest radiograph - increasing leukocytosis is more likely due to recent high dose steroids than new infection    PLAN/RECOMMENDATIONS:    airborne and contact isolation  CXR 1/10 "to my eye" -> significant improvement in bilateral patchy opacities - severe degenerative changes of the bilateral glenohumeral joints with dislocated right humeral head - rib deformities are again identified.  stable oxygenation on room air at rest - the patient is minimally ambulatory  observe off antibacterial antibiotics and diuretics  off pulmonary medications  has completed a 5 day course of remdesivir - renal and liver function are stable  has completed a 10+ day course of decadron 6mg IV - following finger stick glucoses and treating hyperglycemia   diligent DVT prophylaxis - SQ lovenox 40mg 2 times daily  following inflammatory markers - ferritin (moderately elevated) - d-dimer (moderately elevated but decreasing) - CRP (minimally elevated)  not a candidate for monoclonal antibodies on oxygen in the inpatient setting  should receive the COVID vaccination 30 days from discharge  out of bed and into the chair - physical therapy     Will sign off - the patient's respiratory status is stable Patient is DNR. No pulmonary objection to discharge.    Smith Robb MD, [facility]  [phone]  Pulmonary Medicine

## 2022-01-11 NOTE — PROGRESS NOTE ADULT - ASSESSMENT
87 yo F with no reported PMHx, presents to the ED for generalized weakness for the past few days. Pt has also had subjective fevers and her daughter called 911. EMS found the pt to be hypoxic to low 80s on RA, placed on NRB. Here, pt improved to high 90s on 4L NC. She denies SOB, CP, dizziness, n/v/d, abd pain, leg swelling.   ptn is covid positive, admitted with acute hypoxic respiratory failure,   ID and Pulm on board,   chest ct neg for PNA on admission, and CTA chest neg for PE 12/30  DDimer is close to 900 on admission,  tushar on 12/30 with higher O2 demand, now improving   Symbicort and proventil HFA,   completed remdesivir and decadron  worsening oxygenation, requiring on and off 100% NRB on 12/30,  didnt tolerate HF, now comfortable on RA  neg dopplers for DVT,  on Lovenox 40 bid, will change to 10 mg Xarelto on DC to [place], reg AC post [place], providers there to decide  denies having pain in right shoulder, has chronic AC dislocation, mult healed rib fractures  this was d/w in detail  ptn's daughter kp x 30 min.   ptn is DNR

## 2022-01-11 NOTE — PROGRESS NOTE ADULT - SUBJECTIVE AND OBJECTIVE BOX
[facility] - PROGRESS NOTE    CHIEF COMPLAINT: COVID-19 related pneumonia; acute hypoxic respiratory failure; bronchospasm; obesity;     INTERVAL HISTORY: CTA 12/30 - no pulmonary embolism -  increasing diffuse bilateral peripheral and peribronchial vascular groundglass opacities c/w worsening COVID pneumonia; CXR 1/10 "to my eye" -> significant improvement in bilateral patchy opacities - severe degenerative changes of the bilateral glenohumeral joints with dislocated right humeral head - rib deformities are again identified; awake and alert; not cooperative with history or physical examination; waiting for breakfast; no shortness of breath or hypoxemia on room air; no cough, sputum production, hemoptysis, chest congestion or wheeze; no fevers, chills or sweats; no chest pain/pressure or palpitations; has been out of bed occasionally; difficulty walking    REVIEW OF SYSTEMS:  Constitutional: As per interval history  HEENT: Within normal limits  CV: As per interval history  Resp: As per interval history  GI: Within normal limits   : Within normal limits  Musculoskeletal: shoulder arthritis - dislocated right shoulder  Skin: Within normal limits  Neurological: Within normal limits  Psychiatric: Within normal limits  Endocrine: Within normal limits  Hematologic/Lymphatic: Within normal limits  Allergic/Immunologic: Within normal limits    MEDICATIONS:     Pulmonary "    Anti-microbials:    Cardiovascular:  amLODIPine   Tablet 5 milliGRAM(s) Oral daily    Other:  enoxaparin Injectable 40 milliGRAM(s) SubCutaneous every 12 hours  influenza  Vaccine (HIGH DOSE) 0.7 milliLiter(s) IntraMuscular once    OBJECTIVE:    I&O's Detail    10 Darwin 2022 07:01  -  11 Jan 2022 07:00  --------------------------------------------------------  IN:    Oral Fluid: 450 mL  Total IN: 450 mL    OUT:    Voided (mL): 1350 mL  Total OUT: 1350 mL    Total NET: -900 mL    PHYSICAL EXAM:       ICU Vital Signs Last 24 Hrs  T(C): 36.4 (11 Jan 2022 06:42), Max: 36.7 (10 Darwin 2022 21:19)  T(F): 97.6 (11 Jan 2022 06:42), Max: 98.1 (10 Darwin 2022 21:19)  HR: 67 (11 Jan 2022 06:42) (67 - 82)  BP: 133/80 (11 Jan 2022 06:42) (110/65 - 146/83)  BP(mean): --  ABP: --  ABP(mean): --  RR: 18 (11 Jan 2022 06:42) (18 - 18)  SpO2: 93% (11 Jan 2022 06:42) (91% - 94%) on room air     General: Awake. Alert. Not cooperative. No distress. Appears stated age. Obese. In bed.  HEENT: Atraumatic. Normocephalic. Anicteric. Normal oral mucosa. PERRL. EOMI.  Neck: Supple. Trachea midline. Thyroid without enlargement/tenderness/nodules. No carotid bruit. No JVD.	  Cardiovascular: Regular rate and rhythm. S1 S2 normal. II/VI systolic murmur.  Respiratory: Respirations unlabored. Clear to auscultation and percussion. No wheeze. Kyphosis.  Abdomen: Soft. Non-tender. Non-distended. No organomegaly. No masses. Normal bowel sounds. Obese.  Extremities: Warm to touch. No clubbing or cyanosis. No pedal edema.  Pulses: 2+ peripheral pulses all extremities.	  Skin: Keratotic lesions on the legs.  Lymph Nodes: Cervical, supraclavicular and axillary nodes normal  Neurological: Motor and sensory examination equal and normal. A and O x 3  Psychiatry: Appropriate mood and affect.    LABS:                          14.2   21.15 )-----------( 215      ( 10 Darwin 2022 07:01 )             44.7     CBC    WBC  21.15 <==, 16.38 <==, 18.27 <==, 14.60 <==, 15.12 <==    Hemoglobin  14.2 <<==, 14.5 <<==, 14.0 <<==, 13.8 <<==, 14.5 <<==    Hematocrit  44.7 <==, 45.5 <==, 44.4 <==, 43.9 <==, 46.0 <==    Platelets  215 <==, 215 <==, 272 <==, 262 <==, 238 <==      134<L>  |  97  |  28<H>  ----------------------------<  68<L>    01-10  5.2   |  24  |  0.82      LYTES    sodium  134 <==, 135 <==, 134 <==, 133 <==, 133 <==, 138 <==    potassium   5.2 <==, 5.2 <==, 4.5 <==, 6.1 <==, 4.6 <==, 4.6 <==    chloride  97 <==, 97 <==, 96 <==, 96 <==, 100 <==, 102 <==    carbon dioxide  24 <==, 26 <==, 26 <==, 23 <==, 23 <==, 27 <==    =============================================================================================  RENAL FUNCTION:    Creatinine:   0.82  <<==, 0.74  <<==, 0.88  <<==, 0.70  <<==, 0.75  <<==, 0.74  <<==, 0.68  <<==    BUN:   28 <==, 22 <==, 24 <==, 22 <==, 24 <==, 27 <==    ============================================================================================    calcium   9.3 <==, 9.3 <==, 9.0 <==, 8.9 <==, 8.5 <==, 8.6 <==    mag   2.0 <==    ============================================================================================  LFTs    AST:   16 <== , 21 <== , 10 <==     ALT:  18  <== , 18  <== , 10  <==     AP:  72  <=, 70  <=, 66  <=    Bili:  0.3  <=, 0.3  <=, 0.4  <=    D-Dimer Assay, Quantitative (01.07.22 @ 07:47)   D-Dimer Assay, Quantitative: 825  D-Dimer Assay, Quantitative (01.04.22 @ 09:24)   D-Dimer Assay, Quantitative: 1575  D-Dimer Assay, Quantitative (12.31.21 @ 12:14)   D-Dimer Assay, Quantitative: 1109  D-Dimer Assay, Quantitative (12.29.21 @ 09:26)   D-Dimer Assay, Quantitative: 4014  D-Dimer Assay, Quantitative (12.27.21 @ 19:30)   D-Dimer Assay, Quantitative: 1014  D-Dimer Assay, Quantitative (12.27.21 @ 16:51)   D-Dimer Assay, Quantitative: 861    C-Reactive Protein, Serum (01.07.22 @ 07:37)   C-Reactive Protein, Serum: 4 mg/L   C-Reactive Protein, Serum (01.04.22 @ 09:24)   C-Reactive Protein, Serum: 11 mg/L   C-Reactive Protein, Serum (12.27.21 @ 16:51)   C-Reactive Protein, Serum: 133 mg/L     Ferritin, Serum in AM (01.07.22 @ 09:14)   Ferritin, Serum: 505 ng/mL   Ferritin, Serum in AM (01.04.22 @ 16:09)   Ferritin, Serum: 569 ng/mL   Ferritin, Serum in AM (12.31.21 @ 13:51)   Ferritin, Serum: 531 ng/mL   Ferritin, Serum in AM (12.29.21 @ 11:37)   Ferritin, Serum: 717 ng/mL   Ferritin, Serum (12.27.21 @ 23:22)   Ferritin, Serum: 799 ng/mL     MICROBIOLOGY:     Respiratory Viral Panel with COVID-19 by MONTY (12.27.21 @ 16:49)   Rapid RVP Result: Detected   SARS-CoV-2: Detected:    RADIOLOGY:  [x ] Chest radiographs reviewed and interpreted by me    CXR 1/10 "to my eye" -> significant improvement in bilateral patchy opacities - severe degenerative changes of the bilateral glenohumeral joints with dislocated right humeral head - rib deformities are again identified.    < from: Xray Chest 1 View- PORTABLE-Urgent (Xray Chest 1 View- PORTABLE-Urgent .) (01.04.22 @ 10:26) >    EXAM:  XR CHEST PORTABLE URGENT 1V                          PROCEDURE DATE:  01/04/2022      FINDINGS:    There are unchanged patchy opacities predominantly in the bilateral   mid/lower lungs.    No pleural effusion.    Heart size is without change.    Severe degenerative changes of the bilateral glenohumeral joints with   dislocated right humeral head. Rib deformities are again identified.      IMPRESSION:    Unchanged patchy opacities as above.    FLAVIO ACOSTA MD; Resident Radiologist  This document has been electronically signed.  CANDIDO GARCIA MD; Attending Radiologist  This document has been electronically signed. Jan 4 2022  3:54PM  ---------------------------------------------------------------------------------------------------------------  EXAM:  [facility]                          PROCEDURE DATE:  12/30/2021      INTERPRETATION:  CLINICAL INFORMATION: Hypoxia and elevated d-dimer.   Admitted with Covid 19 infection.    FINDINGS:    LUNGS AND AIRWAYS: Trachea is deviated to the right. Tracheobronchial   secretions. Diffuse bilateral peripheral and peribronchial vascular   groundglass opacities, increased when compared with 12/27/2021.  PLEURA: No pleural effusion.  MEDIASTINUM AND JAMILAH: No lymphadenopathy.  VESSELS: No pulmonary embolism. Aortic and coronary artery calcifications.  HEART: Heart size is normal. No pericardial effusion.  CHEST WALL AND LOWER NECK: 1.1 cm left medial breast subcutaneous nodule,   probably a sebaceous cyst. Thyroid is unremarkable.  VISUALIZED UPPER ABDOMEN: Moderate hiatal hernia. 1.1 cm left adrenal   adenoma. 3.4 cm isodense round lesion adjacent to stomach (1, 108)  BONES: Chronic bilateral rib fractures. Inferior right shoulder   dislocation. 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   Multilevel degenerative changes of the thoracic spine. Dextroscoliotic   thoracic curvature.    IMPRESSION:  No pulmonary embolism.    Increased bilateral groundglass opacities, consistent with known Covid 19   infection.    3.4 cm isodense round lesion adjacent to stomach. 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 CT abdomen/pelvis with contrast recommended   for complete evaluation.    PAM LEIVA MD; Resident Radiology  This document has been electronically signed.  OPAL GUZMAN MD; Attending Radiologist  This document has been electronically signed. Dec 058672  9:43AM  ---------------------------------------------------------------------------------------------------------------  < from: CT Chest No Cont (12.27.21 @ 18:58) >    EXAM:  CT CHEST                          PROCEDURE DATE:  12/27/2021      INTERPRETATION:  CLINICAL INFORMATION: Hypoxia    FINDINGS:    Lungs/Airways/Pleura: There are ill defined peripheral and   peribronchovascular predominant ground glass densities throughout both   lungs. The airways are patent. No pleural effusion.    Mediastinum/Lymph nodes: No thoracic adenopathy. There is a moderate   hiatal hernia.    Heart and Vessels: The cardiac chambers are normal in size. There are   mild coronary artery calcifications. No pericardial effusion. The aorta   is normal in caliber.    Upper Abdomen: Unremarkable.    Osseous structures and Soft Tissues: There are old/healing bilateral rib   fractures. No aggressive bone lesions. There is an inferior right   shoulder dislocation. There are bilateral glenohumeral joint effusions.    IMPRESSION:    1.  Lung findings typically seen in COVID infection. Other infections are   also in the differential    2.  Moderate hiatal hernia    3.  Inferior right shoulder dislocation    AMANDA MCGUIRE M.D., Attending Radiologist  This document has been electronically signed. Dec 28 2021  9:54AM  --------------------------------------------------------------------------------------------------------------  EXAM:  XR CHEST PORTABLE URGENT 1V                          PROCEDURE DATE:  12/27/2021      INTERPRETATION:  CLINICAL INFORMATION: Shortness of breath, cough and   fever    TECHNIQUE: AP view(s) of the chest.    COMPARISON: No comparison available.    FINDINGS:  The cardiac silhouette is normal in size. Bilateral lower lung patchy   airspace opacities. No pleural effusion or pneumothorax. No acute bony   abnormality.    IMPRESSION:  Bilateral lower lung patchy airspace opacities.    NICK DENT DO; Resident Radiologist  This document has been electronically signed.  CANDIDO GARCIA MD; Attending Radiologist  This document has been electronically signed. Dec 28 2021 12:06AM    ---------------------------------------------------------------------------------------------------------------  < from: Xray Shoulder 2 Views, Right (12.29.21 @ 17:30) >    EXAM:  XR SHOULDER COMP MIN 2V RT                          PROCEDURE DATE:  12/29/2021      INTERPRETATION:  CLINICAL INDICATION: radiographic correlation of   dislocated right shoulder detected on recent CT    EXAM:  AP and transscapular Y right shoulder from 12/29/2021 at 1730. Reviewed   in conjunction with chest CT from 12/27/2021.    IMPRESSION:  Redemonstrated anteriorly dislocated right shoulder, likely chronic. AC   joint alignment maintained.    Chronic ossific debris adjacent to superior AC joint margin either from   old trauma or degenerative etiology. Degenerative spurring apparent along   the uncovered glenoid and humeral head articular margins.    Multiple old upper right rib fracture deformities also apparent.    No acute appearing fractures.    Generalized osteopenia otherwise no discrete lytic or blastic lesions.    SANTHOSH NGUYEN MD; Attending Radiologist  This document has been electronically signed. Dec 29 2021  5:37PM    ---------------------------------------------------------------------------------------------------------------

## 2022-01-11 NOTE — DISCHARGE NOTE NURSING/CASE MANAGEMENT/SOCIAL WORK - NSDCFUADDAPPT_GEN_ALL_CORE_FT
Podiatry Discharge Instructions:  Follow up: Please follow up with Dr. Tinsley within 1 week of discharge from the hospital, please call [phone] for appointment and discuss that you recently were seen in the hospital.  Weight bearing: Please weight bear as tolerated in a surgical shoe.  Antibiotics: Please continue as instructed.

## 2022-01-11 NOTE — PROGRESS NOTE ADULT - PROVIDER SPECIALTY LIST ADULT
Infectious Disease
Internal Medicine
Pulmonology
Infectious Disease
Infectious Disease
Internal Medicine
Pulmonology
Infectious Disease
Internal Medicine
Pulmonology

## 2022-01-11 NOTE — PROGRESS NOTE ADULT - SUBJECTIVE AND OBJECTIVE BOX
Patient is a 86y old  Female who presents with a chief complaint of     SUBJECTIVE / OVERNIGHT EVENTS: ptn is comfortable, on RA    MEDICATIONS  (STANDING):  amLODIPine   Tablet 5 milliGRAM(s) Oral daily  enoxaparin Injectable 40 milliGRAM(s) SubCutaneous every 12 hours  influenza  Vaccine (HIGH DOSE) 0.7 milliLiter(s) IntraMuscular once    MEDICATIONS  (PRN):      Vital Signs Last 24 Hrs  T(F): 97.5 (01-11-22 @ 15:55), Max: 98.1 (01-10-22 @ 21:19)  HR: 92 (01-11-22 @ 15:55) (67 - 92)  BP: 136/78 (01-11-22 @ 15:55) (104/50 - 146/83)  RR: 18 (01-11-22 @ 15:55) (18 - 18)  SpO2: 92% (01-11-22 @ 15:55) (92% - 94%)  Telemetry:   CAPILLARY BLOOD GLUCOSE        I&O's Summary    10 Darwin 2022 07:01  -  11 Jan 2022 07:00  --------------------------------------------------------  IN: 450 mL / OUT: 1350 mL / NET: -900 mL    11 Jan 2022 07:01  -  11 Jan 2022 18:58  --------------------------------------------------------  IN: 0 mL / OUT: 800 mL / NET: -800 mL        PHYSICAL EXAM:  GENERAL: NAD, well-developed  HEAD:  Atraumatic, Normocephalic  EYES: EOMI, PERRLA, conjunctiva and sclera clear  NECK: Supple, No JVD  CHEST/LUNG: Clear to auscultation bilaterally; No wheeze  HEART: Regular rate and rhythm; No murmurs, rubs, or gallops  ABDOMEN: Soft, Nontender, Nondistended; Bowel sounds present  EXTREMITIES:  2+ Peripheral Pulses, No clubbing, cyanosis, or edema  PSYCH: AAOx3  NEUROLOGY: non-focal  SKIN: No rashes or lesions    LABS:                        13.4   17.93 )-----------( 204      ( 11 Jan 2022 08:01 )             42.1     01-10    134<L>  |  97  |  28<H>  ----------------------------<  68<L>  5.2   |  24  |  0.82    Ca    9.3      10 Darwin 2022 06:58                RADIOLOGY & ADDITIONAL TESTS:    Imaging Personally Reviewed:    Consultant(s) Notes Reviewed:      Care Discussed with Consultants/Other Providers:

## 2022-01-11 NOTE — DISCHARGE NOTE NURSING/CASE MANAGEMENT/SOCIAL WORK - NSDCPEFALRISK_GEN_ALL_CORE
For information on Fall & Injury Prevention, visit: [email or web address] OR  [email or web address] OR  https://www.cdc.gov/steadi/patient.html

## 2024-05-06 ENCOUNTER — INPATIENT (INPATIENT)
Facility: HOSPITAL | Age: 89
LOS: 8 days | Discharge: HOME HEALTH | DRG: 195 | End: 2024-05-15
Attending: INTERNAL MEDICINE | Admitting: INTERNAL MEDICINE
Payer: MEDICARE

## 2024-05-06 VITALS
HEART RATE: 100 BPM | DIASTOLIC BLOOD PRESSURE: 94 MMHG | HEIGHT: 63 IN | RESPIRATION RATE: 26 BRPM | TEMPERATURE: 100 F | SYSTOLIC BLOOD PRESSURE: 145 MMHG | OXYGEN SATURATION: 95 % | WEIGHT: 154.98 LBS

## 2024-05-06 DIAGNOSIS — J18.9 PNEUMONIA, UNSPECIFIED ORGANISM: ICD-10-CM

## 2024-05-06 DIAGNOSIS — R09.89 OTHER SPECIFIED SYMPTOMS AND SIGNS INVOLVING THE CIRCULATORY AND RESPIRATORY SYSTEMS: ICD-10-CM

## 2024-05-06 LAB
ALBUMIN SERPL ELPH-MCNC: 3.8 G/DL — SIGNIFICANT CHANGE UP (ref 3.3–5)
ALP SERPL-CCNC: 101 U/L — SIGNIFICANT CHANGE UP (ref 40–120)
ALT FLD-CCNC: 10 U/L — SIGNIFICANT CHANGE UP (ref 10–45)
ANION GAP SERPL CALC-SCNC: 12 MMOL/L — SIGNIFICANT CHANGE UP (ref 5–17)
APTT BLD: 28.7 SEC — SIGNIFICANT CHANGE UP (ref 24.5–35.6)
AST SERPL-CCNC: 20 U/L — SIGNIFICANT CHANGE UP (ref 10–40)
BASE EXCESS BLDV CALC-SCNC: 6.3 MMOL/L — HIGH (ref -2–3)
BASOPHILS # BLD AUTO: 0.03 K/UL — SIGNIFICANT CHANGE UP (ref 0–0.2)
BASOPHILS NFR BLD AUTO: 0.3 % — SIGNIFICANT CHANGE UP (ref 0–2)
BILIRUB SERPL-MCNC: 0.3 MG/DL — SIGNIFICANT CHANGE UP (ref 0.2–1.2)
BUN SERPL-MCNC: 18 MG/DL — SIGNIFICANT CHANGE UP (ref 7–23)
CA-I SERPL-SCNC: 1.18 MMOL/L — SIGNIFICANT CHANGE UP (ref 1.15–1.33)
CALCIUM SERPL-MCNC: 9 MG/DL — SIGNIFICANT CHANGE UP (ref 8.4–10.5)
CHLORIDE BLDV-SCNC: 98 MMOL/L — SIGNIFICANT CHANGE UP (ref 96–108)
CHLORIDE SERPL-SCNC: 96 MMOL/L — SIGNIFICANT CHANGE UP (ref 96–108)
CO2 BLDV-SCNC: 34 MMOL/L — HIGH (ref 22–26)
CO2 SERPL-SCNC: 26 MMOL/L — SIGNIFICANT CHANGE UP (ref 22–31)
CREAT SERPL-MCNC: 1.11 MG/DL — SIGNIFICANT CHANGE UP (ref 0.5–1.3)
EGFR: 48 ML/MIN/1.73M2 — LOW
EOSINOPHIL # BLD AUTO: 0 K/UL — SIGNIFICANT CHANGE UP (ref 0–0.5)
EOSINOPHIL NFR BLD AUTO: 0 % — SIGNIFICANT CHANGE UP (ref 0–6)
FLUAV AG NPH QL: SIGNIFICANT CHANGE UP
FLUBV AG NPH QL: SIGNIFICANT CHANGE UP
GAS PNL BLDV: 130 MMOL/L — LOW (ref 136–145)
GAS PNL BLDV: SIGNIFICANT CHANGE UP
GAS PNL BLDV: SIGNIFICANT CHANGE UP
GLUCOSE BLDV-MCNC: 148 MG/DL — HIGH (ref 70–99)
GLUCOSE SERPL-MCNC: 143 MG/DL — HIGH (ref 70–99)
HCO3 BLDV-SCNC: 33 MMOL/L — HIGH (ref 22–29)
HCT VFR BLD CALC: 39.3 % — SIGNIFICANT CHANGE UP (ref 34.5–45)
HCT VFR BLDA CALC: 39 % — SIGNIFICANT CHANGE UP (ref 34.5–46.5)
HGB BLD CALC-MCNC: 12.9 G/DL — SIGNIFICANT CHANGE UP (ref 11.7–16.1)
HGB BLD-MCNC: 12.8 G/DL — SIGNIFICANT CHANGE UP (ref 11.5–15.5)
IMM GRANULOCYTES NFR BLD AUTO: 0.5 % — SIGNIFICANT CHANGE UP (ref 0–0.9)
INR BLD: 1.16 RATIO — SIGNIFICANT CHANGE UP (ref 0.85–1.18)
LACTATE BLDV-MCNC: 1.3 MMOL/L — SIGNIFICANT CHANGE UP (ref 0.5–2)
LYMPHOCYTES # BLD AUTO: 0.91 K/UL — LOW (ref 1–3.3)
LYMPHOCYTES # BLD AUTO: 8.5 % — LOW (ref 13–44)
MCHC RBC-ENTMCNC: 30 PG — SIGNIFICANT CHANGE UP (ref 27–34)
MCHC RBC-ENTMCNC: 32.6 GM/DL — SIGNIFICANT CHANGE UP (ref 32–36)
MCV RBC AUTO: 92 FL — SIGNIFICANT CHANGE UP (ref 80–100)
MONOCYTES # BLD AUTO: 0.86 K/UL — SIGNIFICANT CHANGE UP (ref 0–0.9)
MONOCYTES NFR BLD AUTO: 8 % — SIGNIFICANT CHANGE UP (ref 2–14)
NEUTROPHILS # BLD AUTO: 8.9 K/UL — HIGH (ref 1.8–7.4)
NEUTROPHILS NFR BLD AUTO: 82.7 % — HIGH (ref 43–77)
NRBC # BLD: 0 /100 WBCS — SIGNIFICANT CHANGE UP (ref 0–0)
PCO2 BLDV: 54 MMHG — HIGH (ref 39–42)
PH BLDV: 7.39 — SIGNIFICANT CHANGE UP (ref 7.32–7.43)
PLATELET # BLD AUTO: 272 K/UL — SIGNIFICANT CHANGE UP (ref 150–400)
PO2 BLDV: 41 MMHG — SIGNIFICANT CHANGE UP (ref 25–45)
POTASSIUM BLDV-SCNC: 4.3 MMOL/L — SIGNIFICANT CHANGE UP (ref 3.5–5.1)
POTASSIUM SERPL-MCNC: 4.4 MMOL/L — SIGNIFICANT CHANGE UP (ref 3.5–5.3)
POTASSIUM SERPL-SCNC: 4.4 MMOL/L — SIGNIFICANT CHANGE UP (ref 3.5–5.3)
PROT SERPL-MCNC: 7.3 G/DL — SIGNIFICANT CHANGE UP (ref 6–8.3)
PROTHROM AB SERPL-ACNC: 12.1 SEC — SIGNIFICANT CHANGE UP (ref 9.5–13)
RBC # BLD: 4.27 M/UL — SIGNIFICANT CHANGE UP (ref 3.8–5.2)
RBC # FLD: 14.5 % — SIGNIFICANT CHANGE UP (ref 10.3–14.5)
RSV RNA NPH QL NAA+NON-PROBE: SIGNIFICANT CHANGE UP
SAO2 % BLDV: 66.6 % — LOW (ref 67–88)
SARS-COV-2 RNA SPEC QL NAA+PROBE: SIGNIFICANT CHANGE UP
SODIUM SERPL-SCNC: 134 MMOL/L — LOW (ref 135–145)
WBC # BLD: 10.75 K/UL — HIGH (ref 3.8–10.5)
WBC # FLD AUTO: 10.75 K/UL — HIGH (ref 3.8–10.5)

## 2024-05-06 PROCEDURE — 71045 X-RAY EXAM CHEST 1 VIEW: CPT | Mod: 26

## 2024-05-06 PROCEDURE — 99285 EMERGENCY DEPT VISIT HI MDM: CPT | Mod: GC

## 2024-05-06 RX ORDER — ACETAMINOPHEN 500 MG
1000 TABLET ORAL ONCE
Refills: 0 | Status: COMPLETED | OUTPATIENT
Start: 2024-05-06 | End: 2024-05-06

## 2024-05-06 RX ORDER — IPRATROPIUM/ALBUTEROL SULFATE 18-103MCG
3 AEROSOL WITH ADAPTER (GRAM) INHALATION EVERY 6 HOURS
Refills: 0 | Status: DISCONTINUED | OUTPATIENT
Start: 2024-05-06 | End: 2024-05-15

## 2024-05-06 RX ORDER — RIVAROXABAN 15 MG-20MG
10 KIT ORAL DAILY
Refills: 0 | Status: DISCONTINUED | OUTPATIENT
Start: 2024-05-06 | End: 2024-05-06

## 2024-05-06 RX ORDER — SODIUM CHLORIDE 9 MG/ML
1600 INJECTION INTRAMUSCULAR; INTRAVENOUS; SUBCUTANEOUS ONCE
Refills: 0 | Status: COMPLETED | OUTPATIENT
Start: 2024-05-06 | End: 2024-05-06

## 2024-05-06 RX ORDER — PIPERACILLIN AND TAZOBACTAM 4; .5 G/20ML; G/20ML
3.38 INJECTION, POWDER, LYOPHILIZED, FOR SOLUTION INTRAVENOUS EVERY 8 HOURS
Refills: 0 | Status: DISCONTINUED | OUTPATIENT
Start: 2024-05-06 | End: 2024-05-07

## 2024-05-06 RX ORDER — VANCOMYCIN HCL 1 G
1000 VIAL (EA) INTRAVENOUS ONCE
Refills: 0 | Status: COMPLETED | OUTPATIENT
Start: 2024-05-06 | End: 2024-05-06

## 2024-05-06 RX ORDER — ENOXAPARIN SODIUM 100 MG/ML
40 INJECTION SUBCUTANEOUS EVERY 24 HOURS
Refills: 0 | Status: DISCONTINUED | OUTPATIENT
Start: 2024-05-06 | End: 2024-05-15

## 2024-05-06 RX ORDER — AMLODIPINE BESYLATE 2.5 MG/1
5 TABLET ORAL DAILY
Refills: 0 | Status: DISCONTINUED | OUTPATIENT
Start: 2024-05-06 | End: 2024-05-06

## 2024-05-06 RX ORDER — PIPERACILLIN AND TAZOBACTAM 4; .5 G/20ML; G/20ML
3.38 INJECTION, POWDER, LYOPHILIZED, FOR SOLUTION INTRAVENOUS ONCE
Refills: 0 | Status: COMPLETED | OUTPATIENT
Start: 2024-05-06 | End: 2024-05-06

## 2024-05-06 RX ADMIN — PIPERACILLIN AND TAZOBACTAM 200 GRAM(S): 4; .5 INJECTION, POWDER, LYOPHILIZED, FOR SOLUTION INTRAVENOUS at 20:34

## 2024-05-06 RX ADMIN — Medication 250 MILLIGRAM(S): at 22:17

## 2024-05-06 RX ADMIN — Medication 400 MILLIGRAM(S): at 20:34

## 2024-05-06 RX ADMIN — SODIUM CHLORIDE 1600 MILLILITER(S): 9 INJECTION INTRAMUSCULAR; INTRAVENOUS; SUBCUTANEOUS at 20:39

## 2024-05-06 NOTE — H&P ADULT - NSHPLABSRESULTS_GEN_ALL_CORE
< from: CT Angio Chest PE Protocol w/ IV Cont (12.30.21 @ 22:46) >    No pulmonary embolism.    Increased bilateral groundglass opacities, consistent with known Covid 19   infection.    3.4 cm isodense round lesion adjacent to stomach. This is indeterminate   and may represent just tumor. CT abdomen/pelvis with contrast recommended   for complete evaluation.

## 2024-05-06 NOTE — CONSULT NOTE ADULT - ASSESSMENT
88-year-old female      h/o  right  shoulder  dislocation.  rib  fx's      without any prevalent significant past medical condition      c/o  ongoing the past couple of days or so./  noted to be hypoxic by EMS to 88%.         arrives  with aide who a has paperwork , pt  e is DNR DNI/with the [employer] form.      sob/  awaiting ct angio, to  r/o  pe      pt  febrile  and  tachycardic in  er.  cxr, normal        bcx.  ucx/ on iv Rocephin     prior   ct  chest  imaging   with  ?  gist    ct  a/p    dvt ppx     echo           88-year-old female      h/o  right  shoulder  dislocation.  rib  fx's      without any prevalent significant past medical condition      c/o  ongoing the past couple of days or so./  noted to be hypoxic by EMS to 88%.         arrives  with aide who a has paperwork , pt  e is DNR DNI/with the [employer] form.      sob/    wheezing/ ?  pna       awaiting ct angio, to  r/o  pe      pt  febrile  and  tachycardic in  er.  cxr, normal        bcx.  ucx/ on iv Rocephin /  iv steroid, proventil    prior   ct  chest  imaging   with  ?  gist    dementia     ct  a/p, if  family wishe s to  pursue    dvt ppx     daughter  at bedside

## 2024-05-06 NOTE — ED PROVIDER NOTE - NS_EDPROVIDERDISPOUSERTYPE_ED_A_ED
Patient said Dr. Steve Upton gave her card for Dr. Raciel Viramontes but I do not see referral/order in chart.   I will ask Dr. Steve Upton to please enter this so I can do referral.  Patient has appointment with Dr. Raciel Viramontes on 1/21st. Attending Attestation (For Attendings USE Only)...

## 2024-05-06 NOTE — ED ADULT NURSE NOTE - OBJECTIVE STATEMENT
88YF A&OX4 Ambulatory PMHX of HTN presents to the ED via EMS from [facility] c/o hypoxia 88s for 2 days. per EMS, pt was hypoxic 88s O2 rm air, 6L O2 was initiated, and 2 duo nebs were given. private home aid is at bedside. pt denies CP, n/v/d, recent travels, known sick contacts

## 2024-05-06 NOTE — H&P ADULT - ASSESSMENT
88-year-old female without any prevalent significant past medical condition chief complaint of shortness of breath ongoing the past couple of days or so.  Patient noted to be hypoxic by EMS to 88%.  Patient does not wear any home oxygen at home.  Patient present with aide who also has paperwork showing she is DNR DNI/with the [employer] form.  pt with hypoxia ?etiology  r/o PE/pneumoniae  awaiting CTA of the chest  hold bp meds/ npo for now  start on high flow  keep npo for now  dvt prophylaxis  will consider emperic abx/steroid  TTE

## 2024-05-06 NOTE — ED PROVIDER NOTE - PHYSICAL EXAMINATION
GENERAL: Awake, alert, NAD  LUNGS: wheezing w/ rhonchi noted mildy tachypneic   CARDIAC: tachycardic no m/r/g  ABDOMEN: Soft,  non tender, non distended, no rebound, no guarding  EXT: No edema, no calf tenderness,   NEURO: A&Ox3. Moving all extremities.  SKIN: Warm and dry. No rash.  PSYCH: Normal affect.

## 2024-05-06 NOTE — ED PROVIDER NOTE - ATTENDING CONTRIBUTION TO CARE
88 F w/ no sign pmh here w/ cough and sob, aide at bedside reports that she last saw pt on friday at that time pt was normal, today pt w/ worsening cough and sob, was brought to hospital, daughter on her way to hospital, pt w/ fever and hypoxia, concern for pna, however given neg xray chest will obtain PE study to assess for infiltrate vs pe as eitiology of hypoxia, pt w/ no cp, reports no lower leg edema, pt w/ insp/exp wheezing ?viral however will tx empirically for pna, given broad spectrum antibiotics

## 2024-05-06 NOTE — ED PROVIDER NOTE - CLINICAL SUMMARY MEDICAL DECISION MAKING FREE TEXT BOX
Given history and physical exam in conjunction with fever hypoxia likely to be pneumonia in origin.  Will empirically treat for sepsis to be admitted patient does not have a history of heart failure less likely cardiac in etiolgy

## 2024-05-06 NOTE — ED PROVIDER NOTE - OBJECTIVE STATEMENT
88-year-old female without any prevalent significant past medical condition chief complaint of shortness of breath ongoing the past couple of days or so.  Patient noted to be hypoxic by EMS to 88%.  Patient does not wear any home oxygen at home.  Patient present with aide who also has paperwork showing she is DNR DNI/with the [employer] form.    PCP Dr Virgen

## 2024-05-07 ENCOUNTER — RESULT REVIEW (OUTPATIENT)
Age: 89
End: 2024-05-07

## 2024-05-07 PROBLEM — Z78.9 OTHER SPECIFIED HEALTH STATUS: Chronic | Status: ACTIVE | Noted: 2021-12-27

## 2024-05-07 LAB
ALBUMIN SERPL ELPH-MCNC: 3.6 G/DL — SIGNIFICANT CHANGE UP (ref 3.3–5)
ALP SERPL-CCNC: 90 U/L — SIGNIFICANT CHANGE UP (ref 40–120)
ALT FLD-CCNC: 11 U/L — SIGNIFICANT CHANGE UP (ref 10–45)
ANION GAP SERPL CALC-SCNC: 16 MMOL/L — SIGNIFICANT CHANGE UP (ref 5–17)
AST SERPL-CCNC: 18 U/L — SIGNIFICANT CHANGE UP (ref 10–40)
BILIRUB SERPL-MCNC: 0.2 MG/DL — SIGNIFICANT CHANGE UP (ref 0.2–1.2)
BUN SERPL-MCNC: 17 MG/DL — SIGNIFICANT CHANGE UP (ref 7–23)
CALCIUM SERPL-MCNC: 8.2 MG/DL — LOW (ref 8.4–10.5)
CHLORIDE SERPL-SCNC: 94 MMOL/L — LOW (ref 96–108)
CO2 SERPL-SCNC: 25 MMOL/L — SIGNIFICANT CHANGE UP (ref 22–31)
CREAT SERPL-MCNC: 1.05 MG/DL — SIGNIFICANT CHANGE UP (ref 0.5–1.3)
EGFR: 51 ML/MIN/1.73M2 — LOW
GLUCOSE SERPL-MCNC: 307 MG/DL — HIGH (ref 70–99)
HCT VFR BLD CALC: 40.1 % — SIGNIFICANT CHANGE UP (ref 34.5–45)
HGB BLD-MCNC: 12.7 G/DL — SIGNIFICANT CHANGE UP (ref 11.5–15.5)
MCHC RBC-ENTMCNC: 29.4 PG — SIGNIFICANT CHANGE UP (ref 27–34)
MCHC RBC-ENTMCNC: 31.7 GM/DL — LOW (ref 32–36)
MCV RBC AUTO: 92.8 FL — SIGNIFICANT CHANGE UP (ref 80–100)
NRBC # BLD: 0 /100 WBCS — SIGNIFICANT CHANGE UP (ref 0–0)
PLATELET # BLD AUTO: 246 K/UL — SIGNIFICANT CHANGE UP (ref 150–400)
POTASSIUM SERPL-MCNC: 4.5 MMOL/L — SIGNIFICANT CHANGE UP (ref 3.5–5.3)
POTASSIUM SERPL-SCNC: 4.5 MMOL/L — SIGNIFICANT CHANGE UP (ref 3.5–5.3)
PROT SERPL-MCNC: 7 G/DL — SIGNIFICANT CHANGE UP (ref 6–8.3)
RBC # BLD: 4.32 M/UL — SIGNIFICANT CHANGE UP (ref 3.8–5.2)
RBC # FLD: 14.6 % — HIGH (ref 10.3–14.5)
SODIUM SERPL-SCNC: 135 MMOL/L — SIGNIFICANT CHANGE UP (ref 135–145)
WBC # BLD: 10.72 K/UL — HIGH (ref 3.8–10.5)
WBC # FLD AUTO: 10.72 K/UL — HIGH (ref 3.8–10.5)

## 2024-05-07 PROCEDURE — 71275 CT ANGIOGRAPHY CHEST: CPT | Mod: 26

## 2024-05-07 PROCEDURE — 93306 TTE W/DOPPLER COMPLETE: CPT | Mod: 26

## 2024-05-07 RX ORDER — DEXTROSE 50 % IN WATER 50 %
25 SYRINGE (ML) INTRAVENOUS ONCE
Refills: 0 | Status: DISCONTINUED | OUTPATIENT
Start: 2024-05-07 | End: 2024-05-15

## 2024-05-07 RX ORDER — GLUCAGON INJECTION, SOLUTION 0.5 MG/.1ML
1 INJECTION, SOLUTION SUBCUTANEOUS ONCE
Refills: 0 | Status: DISCONTINUED | OUTPATIENT
Start: 2024-05-07 | End: 2024-05-15

## 2024-05-07 RX ORDER — DEXTROSE 50 % IN WATER 50 %
15 SYRINGE (ML) INTRAVENOUS ONCE
Refills: 0 | Status: DISCONTINUED | OUTPATIENT
Start: 2024-05-07 | End: 2024-05-15

## 2024-05-07 RX ORDER — AZITHROMYCIN 500 MG/1
500 TABLET, FILM COATED ORAL EVERY 24 HOURS
Refills: 0 | Status: DISCONTINUED | OUTPATIENT
Start: 2024-05-07 | End: 2024-05-10

## 2024-05-07 RX ORDER — INSULIN LISPRO 100/ML
VIAL (ML) SUBCUTANEOUS
Refills: 0 | Status: DISCONTINUED | OUTPATIENT
Start: 2024-05-07 | End: 2024-05-15

## 2024-05-07 RX ORDER — DEXTROSE 50 % IN WATER 50 %
12.5 SYRINGE (ML) INTRAVENOUS ONCE
Refills: 0 | Status: DISCONTINUED | OUTPATIENT
Start: 2024-05-07 | End: 2024-05-15

## 2024-05-07 RX ORDER — DEXTROSE 10 % IN WATER 10 %
125 INTRAVENOUS SOLUTION INTRAVENOUS ONCE
Refills: 0 | Status: DISCONTINUED | OUTPATIENT
Start: 2024-05-07 | End: 2024-05-15

## 2024-05-07 RX ORDER — CEFTRIAXONE 500 MG/1
1000 INJECTION, POWDER, FOR SOLUTION INTRAMUSCULAR; INTRAVENOUS EVERY 24 HOURS
Refills: 0 | Status: DISCONTINUED | OUTPATIENT
Start: 2024-05-07 | End: 2024-05-10

## 2024-05-07 RX ORDER — INFLUENZA VIRUS VACCINE 15; 15; 15; 15 UG/.5ML; UG/.5ML; UG/.5ML; UG/.5ML
0.7 SUSPENSION INTRAMUSCULAR ONCE
Refills: 0 | Status: DISCONTINUED | OUTPATIENT
Start: 2024-05-07 | End: 2024-05-15

## 2024-05-07 RX ORDER — SODIUM CHLORIDE 9 MG/ML
1000 INJECTION, SOLUTION INTRAVENOUS
Refills: 0 | Status: DISCONTINUED | OUTPATIENT
Start: 2024-05-07 | End: 2024-05-15

## 2024-05-07 RX ADMIN — Medication 20 MILLIGRAM(S): at 00:45

## 2024-05-07 RX ADMIN — Medication 20 MILLIGRAM(S): at 23:04

## 2024-05-07 RX ADMIN — Medication 3 MILLILITER(S): at 18:32

## 2024-05-07 RX ADMIN — CEFTRIAXONE 100 MILLIGRAM(S): 500 INJECTION, POWDER, FOR SOLUTION INTRAMUSCULAR; INTRAVENOUS at 18:33

## 2024-05-07 RX ADMIN — Medication 3 MILLILITER(S): at 05:08

## 2024-05-07 RX ADMIN — ENOXAPARIN SODIUM 40 MILLIGRAM(S): 100 INJECTION SUBCUTANEOUS at 05:08

## 2024-05-07 RX ADMIN — PIPERACILLIN AND TAZOBACTAM 25 GRAM(S): 4; .5 INJECTION, POWDER, LYOPHILIZED, FOR SOLUTION INTRAVENOUS at 05:08

## 2024-05-07 RX ADMIN — Medication 3 MILLILITER(S): at 23:04

## 2024-05-07 RX ADMIN — Medication 20 MILLIGRAM(S): at 05:08

## 2024-05-07 RX ADMIN — PIPERACILLIN AND TAZOBACTAM 25 GRAM(S): 4; .5 INJECTION, POWDER, LYOPHILIZED, FOR SOLUTION INTRAVENOUS at 00:03

## 2024-05-07 RX ADMIN — AZITHROMYCIN 255 MILLIGRAM(S): 500 TABLET, FILM COATED ORAL at 23:04

## 2024-05-07 RX ADMIN — Medication 20 MILLIGRAM(S): at 18:33

## 2024-05-07 RX ADMIN — Medication 3 MILLILITER(S): at 00:04

## 2024-05-07 NOTE — ED ADULT NURSE REASSESSMENT NOTE - NS ED NURSE REASSESS COMMENT FT1
Patient is A&Ox3, respiratory rate 22-24 with wheezes heard - DUONEB given and ACP Mike Tee made aware. Patient on 4L NC no accessory muscle use. Patient is A&Ox3, respiratory rate 22-24 with wheezes heard - DUONEB given and ACP Mike Tee made aware. Patient on 4L NC no accessory muscle use. New order for methylPREDNISolone sodium succinate Injectable [Ordered as SOLU-Medrol Injectable]  20 milliGRAM(s), IV Push ordered and given.

## 2024-05-07 NOTE — CONSULT NOTE ADULT - ASSESSMENT
88-year-old female without any prevalent significant past medical condition chief complaint of shortness of breath ongoing the past couple of days or so.  Patient noted to be hypoxic by EMS to 88%.  Patient does not wear any home oxygen at home.     # Mediastinal Lymph Adenopathy  # RLL post obstructive pneumonia     -    88-year-old female without any prevalent significant past medical condition chief complaint of shortness of breath ongoing the past couple of days or so.  Patient noted to be hypoxic by EMS to 88%.  Patient does not wear any home oxygen at home.     # Mediastinal Lymphdenopathy  # RLL post obstructive pneumonia   - Will need to ascertain GOC before considering invasive measures such as bronchoscopy   - Pt has DNR/DNI MOLST, but unclear what her full goals of care are.  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   - In the mean time can be treated for the post obstructive pneumonia  - Would treat with ceftriaxone + Azithromycin for community acquired pneumonia.   - Sputum culture  - MRSA nasal swab   - Urine Legionella   - No indication for steroids at this time.   - Titrate oxygen to 88-92%   - Use humidified oxygen  - out of bed to chair and early ambulation as able  - Incentive spirometry  - physical therapy  - Aspiration precautions  - DVT ppx  88-year-old female without any prevalent significant past medical condition chief complaint of shortness of breath ongoing the past couple of days or so.  Patient noted to be hypoxic by EMS to 88%.  Patient does not wear any home oxygen at home.     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   - In the mean time can be treated for the post obstructive pneumonia  - Would treat with ceftriaxone + Azithromycin for community acquired pneumonia.   - Sputum culture  - MRSA nasal swab   - Urine Legionella   - No indication for steroids at this time.   - Titrate oxygen to 88-92%   - Use humidified oxygen  - out of bed to chair and early ambulation as able  - Incentive spirometry  - physical therapy  - Aspiration precautions  - DVT ppx       Discussed findings in detail with patient and her aid at bedside. We discussed that there is a concern for cancer given her mediastinal lymphadenopathy. We explained our recommendations for a bronchoscopy with lymph node biopsies. Pt declined any procedures at this time. Does not want to pursue further work up. Asked us to not speak to her daughter regarding these findings.     Pulmonary will sign off. Please call back with any questions

## 2024-05-07 NOTE — CONSULT NOTE ADULT - ATTENDING COMMENTS
87 y/o F w/hx as above including 87 y/o F w/hx as above including prior COVID infection at which time she had a CT scan concerning for possible gastric malignancy now presenting with dyspnea and hypoxemia. CT chest showing mediastinal adenopathy with compression of RLL bronchi. Presumed postobstructive PNA. Discussed possibility of cancer with patient and the option of biopsy and patient is not interested in pursing biopsy for diagnosis.    - Supplemental O2 as needed goal O2 sat >= 90%  - Complete course of abx  - Please recall if patient changes her mind about biopsy

## 2024-05-07 NOTE — PATIENT PROFILE ADULT - FUNCTIONAL ASSESSMENT - BASIC MOBILITY 6.
2-calculated by average/Not able to assess (calculate score using [facility] averaging method)

## 2024-05-07 NOTE — PROGRESS NOTE ADULT - SUBJECTIVE AND OBJECTIVE BOX
Date of Service: 05-07-24 @ 09:53           CARDIOLOGY     PROGRESS  NOTE   ________________________________________________    CHIEF COMPLAINT:Patient is a 88y old  Female who presents with a chief complaint of sob (07 May 2024 09:18)  decrease sob/ less wheezing  	  REVIEW OF SYSTEMS:  CONSTITUTIONAL: No fever, weight loss, or fatigue  EYES: No eye pain, visual disturbances, or discharge  ENT:  No difficulty hearing, tinnitus, vertigo; No sinus or throat pain  NECK: No pain or stiffness  RESPIRATORY: No cough, wheezing, chills or hemoptysis;+ Shortness of Breath  CARDIOVASCULAR: No chest pain, palpitations, passing out, dizziness, or leg swelling  GASTROINTESTINAL: No abdominal or epigastric pain. No nausea, vomiting, or hematemesis; No diarrhea or constipation. No melena or hematochezia.  GENITOURINARY: No dysuria, frequency, hematuria, or incontinence  NEUROLOGICAL: No headaches, memory loss, loss of strength, numbness, or tremors  SKIN: No itching, burning, rashes, or lesions   LYMPH Nodes: No enlarged glands  ENDOCRINE: No heat or cold intolerance; No hair loss  MUSCULOSKELETAL: No joint pain or swelling; No muscle, back, or extremity pain  PSYCHIATRIC: No depression, anxiety, mood swings, or difficulty sleeping  HEME/LYMPH: No easy bruising, or bleeding gums  ALLERGY AND IMMUNOLOGIC: No hives or eczema	    [x ] All others negative	  [ ] Unable to obtain    PHYSICAL EXAM:  T(C): 36.6 (05-07-24 @ 04:28), Max: 37.9 (05-06-24 @ 20:00)  HR: 68 (05-07-24 @ 04:28) (68 - 107)  BP: 119/79 (05-07-24 @ 04:28) (114/69 - 145/94)  RR: 18 (05-07-24 @ 01:46) (18 - 26)  SpO2: 97% (05-07-24 @ 01:46) (90% - 97%)  Wt(kg): --  I&O's Summary      Appearance: Normal	  HEENT:   Normal oral mucosa, PERRL, EOMI	  Lymphatic: No lymphadenopathy  Cardiovascular: Normal S1 S2, No JVD,+ murmurs, No edema  Respiratory: decrease bs	  Psychiatry: A & O x 3, Mood & affect appropriate  Gastrointestinal:  Soft, Non-tender, + BS	  Skin: No rashes, No ecchymoses, No cyanosis	  Extremities: Normal range of motion, No clubbing, cyanosis or edema  Vascular: Peripheral pulses palpable 2+ bilaterally    MEDICATIONS  (STANDING):  albuterol/ipratropium for Nebulization 3 milliLiter(s) Nebulizer every 6 hours  enoxaparin Injectable 40 milliGRAM(s) SubCutaneous every 24 hours  methylPREDNISolone sodium succinate Injectable 20 milliGRAM(s) IV Push three times a day  multivitamin 1 Tablet(s) Oral daily  piperacillin/tazobactam IVPB.. 3.375 Gram(s) IV Intermittent every 8 hours      TELEMETRY: 	    ECG:  	  RADIOLOGY:  OTHER: 	  	  LABS:	 	    CARDIAC MARKERS:                                12.7   10.72 )-----------( 246      ( 07 May 2024 06:54 )             40.1     05-07    135  |  94<L>  |  17  ----------------------------<  307<H>  4.5   |  25  |  1.05    Ca    8.2<L>      07 May 2024 06:54    TPro  7.0  /  Alb  3.6  /  TBili  0.2  /  DBili  x   /  AST  18  /  ALT  11  /  AlkPhos  90  05-07    proBNP:   Lipid Profile:   HgA1c:   TSH:   PT/INR - ( 06 May 2024 20:31 )   PT: 12.1 sec;   INR: 1.16 ratio         PTT - ( 06 May 2024 20:31 )  PTT:28.7 sec      < from: CT Angio Chest PE Protocol w/ IV Cont (05.07.24 @ 02:21) >  PULMONARY ANGIOGRAM: There is no evidence of pulmonary embolus. Patchy   subsegmental areas of airspace consolidation within the right lower lobe;   most likely developing pneumonia.    LYMPH NODES: Right-sided perihilar enlarged lymph nodes measuring up to   1.5 cm in the short axis. These causing significant mass effect on the   adjacent bronchi. Proximal right lower lobe bronchial branches are nearly   completely obliterated by the mass effect of the adjacent lymphadenopathy.    HEART/VASCULATURE: Moderate atherosclerotic calcifications of the aorta   and coronary arteries..    AIRWAYS/LUNGS/PLEURA: Right-sided perihilar opacity with right lower lobe   multifocal opacification.    UPPER ABDOMEN: 1.2 x 1.2 cm left adrenal nodule.    BONES/SOFT TISSUES: Multilevel degenerative change of the spine.    IMPRESSION:    No evidence of pulmonary embolism.    Right lower lobe post obstructive pneumonia.    Right-sided perihilar lymph nodes causing mass effect on the adjacent   bronchi.    Left-sided adrenal nodule.        Assessment and plan  ---------------------------  88-year-old female without any prevalent significant past medical condition chief complaint of shortness of breath ongoing the past couple of days or so.  Patient noted to be hypoxic by EMS to 88%.  Patient does not wear any home oxygen at home.  Patient present with aide who also has paperwork showing she is DNR DNI/with the [employer] form.  pt with hypoxia ?etiology  r/o PE/pneumoniae  awaiting CTA of the chest  hold bp meds/ npo for now  start on high flow  keep npo for now  dvt prophylaxis  started on iv zosyn, CTA noted  TTE   pulmonary eval

## 2024-05-07 NOTE — PROGRESS NOTE ADULT - ASSESSMENT
88-year-old female      h/o  right  shoulder  dislocation.  rib  fx's      without any prevalent significant past medical condition      c/o  ongoing the past couple of days or so./  noted to be hypoxic by EMS to 88%.        pt  is DNR DNI/  Molst   form.      sob/    wheezing/  CAP       pt  febrile  and  tachycardic in  er.  cxr, normal        bcx.  ucx          on iv Rocephin /  iv steroid, proventil    prior   ct  chest  imaging   with  ?  gist    dementia     ct  a/p,   pending     dvt ppx      CT   chest,  no  pe/  pna.  R олег  hilar  l  nodes,  with  mass  effect on bronchus/  pulm  eval    spoke  with  mayelin    pt   is  dnr/  dni

## 2024-05-07 NOTE — ED ADULT NURSE REASSESSMENT NOTE - NS ED NURSE REASSESS COMMENT FT1
Patient going to CT then moved to [facility] - [facility] Mike Tee made aware. On 4LNC - no accessory muscle use, patient denies SOB at this time. Patient A&Ox4.

## 2024-05-07 NOTE — ED ADULT NURSE REASSESSMENT NOTE - NS ED NURSE REASSESS COMMENT FT1
Patient given DUONEB and methylPREDNISolone sodium succinate Injectable - patient reaccessed A&Ox4, breathing comfortably on 4L NC - no accessory muscle use, wheezing heard, patient denies SOB.

## 2024-05-07 NOTE — PROGRESS NOTE ADULT - SUBJECTIVE AND OBJECTIVE BOX
date of service: 05-07-24 @ 09:19  [email or web address]  REVIEW OF SYSTEMS:  CONSTITUTIONAL: No fever,  no  weight loss  ENT:  No  tinnitus,   no   vertigo  NECK: No pain or stiffness  RESPIRATORY: No cough, wheezing, chills or hemoptysis;    No Shortness of Breath  CARDIOVASCULAR: No chest pain, palpitations, dizziness  GASTROINTESTINAL: No abdominal or epigastric pain. No nausea, vomiting, or hematemesis; No diarrhea  No melena or hematochezia.  GENITOURINARY: No dysuria, frequency, hematuria, or incontinence  NEUROLOGICAL: No headaches  SKIN: No itching,  no   rash  LYMPH Nodes: No enlarged glands  ENDOCRINE: No heat or cold intolerance  MUSCULOSKELETAL: No joint pain or swelling  PSYCHIATRIC: No depression, anxiety  HEME/LYMPH: No easy bruising, or bleeding gums  ALLERGY AND IMMUNOLOGIC: No hives or eczema	    MEDICATIONS  (STANDING):  albuterol/ipratropium for Nebulization 3 milliLiter(s) Nebulizer every 6 hours  enoxaparin Injectable 40 milliGRAM(s) SubCutaneous every 24 hours  methylPREDNISolone sodium succinate Injectable 20 milliGRAM(s) IV Push three times a day  multivitamin 1 Tablet(s) Oral daily  piperacillin/tazobactam IVPB.. 3.375 Gram(s) IV Intermittent every 8 hours    MEDICATIONS  (PRN):      Vital Signs Last 24 Hrs  T(C): 36.6 (07 May 2024 04:28), Max: 37.9 (06 May 2024 20:00)  T(F): 97.9 (07 May 2024 04:28), Max: 100.3 (06 May 2024 20:00)  HR: 68 (07 May 2024 04:28) (68 - 107)  BP: 119/79 (07 May 2024 04:28) (114/69 - 145/94)  BP(mean): --  RR: 18 (07 May 2024 01:46) (18 - 26)  SpO2: 97% (07 May 2024 01:46) (90% - 97%)    Parameters below as of 07 May 2024 04:28  Patient On (Oxygen Delivery Method): nasal cannula  O2 Flow (L/min): 4    CAPILLARY BLOOD GLUCOSE        I&O's Summary        Appearance: Normal	  HEENT:   Normal oral mucosa, PERRL, EOMI	  Lymphatic: No lymphadenopathy  Cardiovascular: Normal S1 S2, No JVD  Respiratory: Lungs clear to auscultation	  Gastrointestinal:  Soft, Non-tender, + BS	  Skin: No rash, No ecchymoses	  Extremities:     LABS:                        12.7   10.72 )-----------( 246      ( 07 May 2024 06:54 )             40.1     05-07    135  |  94<L>  |  17  ----------------------------<  307<H>  4.5   |  25  |  1.05    Ca    8.2<L>      07 May 2024 06:54    TPro  7.0  /  Alb  3.6  /  TBili  0.2  /  DBili  x   /  AST  18  /  ALT  11  /  AlkPhos  90  05-07    PT/INR - ( 06 May 2024 20:31 )   PT: 12.1 sec;   INR: 1.16 ratio         PTT - ( 06 May 2024 20:31 )  PTT:28.7 sec      Urinalysis Basic - ( 07 May 2024 06:54 )    Color: x / Appearance: x / SG: x / pH: x  Gluc: 307 mg/dL / Ketone: x  / Bili: x / Urobili: x   Blood: x / Protein: x / Nitrite: x   Leuk Esterase: x / RBC: x / WBC x   Sq Epi: x / Non Sq Epi: x / Bacteria: x          05-06 @ 20:12  4.3  41              Consultant(s) Notes Reviewed:      Care Discussed with Consultants/Other Providers:

## 2024-05-07 NOTE — CONSULT NOTE ADULT - SUBJECTIVE AND OBJECTIVE BOX
CHIEF COMPLAINT:Patient is a 88y old  Female who presents with a chief complaint of sob (07 May 2024 09:52)      HPI:  Date of Service, 05-06-24 @ 21:48  CHIEF COMPLAINT:Patient is a 88y old  Female who presents with a chief complaint of     HPI:  88-year-old female without any prevalent significant past medical condition chief complaint of shortness of breath ongoing the past couple of days or so.  Patient noted to be hypoxic by EMS to 88%.  Patient does not wear any home oxygen at home.  Patient present with aide who also has paperwork showing she is DNR DNI/with the [facility] form.    PAST MEDICAL & SURGICAL HISTORY:  No pertinent past medical history      No significant past surgical history      MEDICATIONS  (STANDING):  vancomycin  IVPB. 1000 milliGRAM(s) IV Intermittent once    MEDICATIONS  (PRN):      FAMILY HISTORY:      SOCIAL HISTORY:    [ x] Non-smoker  [ ] Smoker  [ ] Alcohol    Allergies    No Known Allergies    Intolerances    	    REVIEW OF SYSTEMS:  CONSTITUTIONAL: No fever, weight loss, or fatigue  EYES: No eye pain, visual disturbances, or discharge  ENT:  No difficulty hearing, tinnitus, vertigo; No sinus or throat pain  NECK: No pain or stiffness  RESPIRATORY: No cough, wheezing, chills or hemoptysis; + Shortness of Breath  CARDIOVASCULAR: No chest pain, palpitations, passing out, dizziness, or leg swelling  GASTROINTESTINAL: No abdominal or epigastric pain. No nausea, vomiting, or hematemesis; No diarrhea or constipation. No melena or hematochezia.  GENITOURINARY: No dysuria, frequency, hematuria, or incontinence  NEUROLOGICAL: No headaches, memory loss, loss of strength, numbness, or tremors  SKIN: No itching, burning, rashes, or lesions   LYMPH Nodes: No enlarged glands  ENDOCRINE: No heat or cold intolerance; No hair loss  MUSCULOSKELETAL: No joint pain or swelling; No muscle, back, or extremity pain  PSYCHIATRIC: No depression, anxiety, mood swings, or difficulty sleeping  HEME/LYMPH: No easy bruising, or bleeding gums  ALLERGY AND IMMUNOLOGIC: No hives or eczema	    [x ] All others negative	  [ ] Unable to obtain    PHYSICAL EXAM:  T(C): 37.9 (05-06-24 @ 20:00), Max: 37.9 (05-06-24 @ 20:00)  HR: 107 (05-06-24 @ 20:00) (100 - 107)  BP: 130/77 (05-06-24 @ 20:00) (130/77 - 145/94)  RR: 26 (05-06-24 @ 20:00) (26 - 26)  SpO2: 96% (05-06-24 @ 20:00) (90% - 96%)  Wt(kg): --  I&O's Summary      Appearance: Normal	  HEENT:   Normal oral mucosa, PERRL, EOMI	  Lymphatic: No lymphadenopathy  Cardiovascular: Normal S1 S2, No JVD, +murmurs, No edema  Respiratory: rhonchi  Gastrointestinal:  Soft, Non-tender, + BS	  Skin: No rashes, No ecchymoses, No cyanosis	  Extremities: Normal range of motion, No clubbing, cyanosis or edema  Vascular: Peripheral pulses palpable 2+ bilaterally    TELEMETRY: 	    ECG:  	  RADIOLOGY:  OTHER: 	  	  LABS:	 	    CARDIAC MARKERS:                              12.8   10.75 )-----------( 272      ( 06 May 2024 20:31 )             39.3     05-06    134<L>  |  96  |  18  ----------------------------<  143<H>  4.4   |  26  |  1.11    Ca    9.0      06 May 2024 20:31    TPro  7.3  /  Alb  3.8  /  TBili  0.3  /  DBili  x   /  AST  20  /  ALT  10  /  AlkPhos  101  05-06    proBNP:   Lipid Profile:   HgA1c:   TSH:   PT/INR - ( 06 May 2024 20:31 )   PT: 12.1 sec;   INR: 1.16 ratio         PTT - ( 06 May 2024 20:31 )  PTT:28.7 sec    PREVIOUS DIAGNOSTIC TESTING:      < from: 12 Lead ECG (12.27.21 @ 16:04) >  Diagnosis Line NORMAL SINUS RHYTHM  LEFT AXIS DEVIATION  VOLTAGE CRITERIA FOR LEFT VENTRICULAR HYPERTROPHY  ABNORMAL ECG  NO PREVIOUS ECGS AVAILABLE    < from: Xray Chest 1 View- PORTABLE-Urgent (05.06.24 @ 20:51) >  FINDINGS:  Heart/Vascular: Heart size is poorly assessed on this projection  Pulmonary: The lungs areclear. No pleural effusion. No pneumothorax.  Bones: No acute osseous abnormalities. Stable right rib fractures.    IMPRESSION:  Clear lungs.             (06 May 2024 21:48)      PAST MEDICAL & SURGICAL HISTORY:  No pertinent past medical history      No significant past surgical history          FAMILY HISTORY:      SOCIAL HISTORY:  Smoking: [ ] Never Smoked [ ] Former Smoker (__ packs x ___ years) [ ] Current Smoker  (__ packs x ___ years)  Substance Use: [ ] Never Used [ ] Used ____  EtOH Use:  Marital Status: [ ] Single [ ]  [ ]  [ ]   Sexual History:   Occupation:  Recent Travel:  Country of Birth:  Advance Directives:    Allergies    No Known Allergies    Intolerances        HOME MEDICATIONS:  Home Medications:  amLODIPine 5 mg oral tablet: 1 tab(s) orally once a day (11 Jan 2022 10:50)  multivitamin:  (28 Dec 2021 15:31)  Xarelto 10 mg oral tablet: 1 tab(s) orally once a day (11 Jan 2022 10:54)      REVIEW OF SYSTEMS:  Constitutional: [ ] negative [ ] fevers [ ] chills [ ] weight loss [ ] weight gain  HEENT: [ ] negative [ ] dry eyes [ ] eye irritation [ ] postnasal drip [ ] nasal congestion  CV: [ ] negative  [ ] chest pain [ ] orthopnea [ ] palpitations [ ] murmur  Resp: [ ] negative [ ] cough [ ] shortness of breath [ ] dyspnea [ ] wheezing [ ] sputum [ ] hemoptysis  GI: [ ] negative [ ] nausea [ ] vomiting [ ] diarrhea [ ] constipation [ ] abd pain [ ] dysphagia   : [ ] negative [ ] dysuria [ ] nocturia [ ] hematuria [ ] increased urinary frequency  Musculoskeletal: [ ] negative [ ] back pain [ ] myalgias [ ] arthralgias [ ] fracture  Skin: [ ] negative [ ] rash [ ] itch  Neurological: [ ] negative [ ] headache [ ] dizziness [ ] syncope [ ] weakness [ ] numbness  Psychiatric: [ ] negative [ ] anxiety [ ] depression  Endocrine: [ ] negative [ ] diabetes [ ] thyroid problem  Hematologic/Lymphatic: [ ] negative [ ] anemia [ ] bleeding problem  Allergic/Immunologic: [ ] negative [ ] itchy eyes [ ] nasal discharge [ ] hives [ ] angioedema  [x] All other systems negative  [ ] Unable to assess ROS because ________    OBJECTIVE:  ICU Vital Signs Last 24 Hrs  T(C): 36.6 (07 May 2024 10:27), Max: 37.9 (06 May 2024 20:00)  T(F): 97.8 (07 May 2024 10:27), Max: 100.3 (06 May 2024 20:00)  HR: 88 (07 May 2024 10:27) (68 - 107)  BP: 136/70 (07 May 2024 10:27) (114/69 - 145/94)  BP(mean): --  ABP: --  ABP(mean): --  RR: 20 (07 May 2024 10:27) (18 - 26)  SpO2: 98% (07 May 2024 10:27) (90% - 98%)    O2 Parameters below as of 07 May 2024 10:27  Patient On (Oxygen Delivery Method): nasal cannula, 4              CAPILLARY BLOOD GLUCOSE          PHYSICAL EXAM:  GENERAL: NAD, well-groomed, well-developed  HEAD:  Atraumatic, Normocephalic  EYES: EOMI, conjunctiva and sclera clear  ENMT: Moist mucous membranes  CHEST/LUNG: Clear to auscultation bilaterally; No rales, rhonchi, wheezing, or rubs  HEART: Regular rate and rhythm; No murmurs, rubs, or gallops  ABDOMEN: Nondistended  VASCULAR: No  cyanosis, or edema  SKIN: No rashes or lesions  NERVOUS SYSTEM:  Alert & Oriented X3, Good concentration    HOSPITAL MEDICATIONS:  Standing Meds:  albuterol/ipratropium for Nebulization 3 milliLiter(s) Nebulizer every 6 hours  enoxaparin Injectable 40 milliGRAM(s) SubCutaneous every 24 hours  methylPREDNISolone sodium succinate Injectable 20 milliGRAM(s) IV Push three times a day  multivitamin 1 Tablet(s) Oral daily  piperacillin/tazobactam IVPB.. 3.375 Gram(s) IV Intermittent every 8 hours      PRN Meds:      LABS:    05-07    135  |  94<L>  |  17  ----------------------------<  307<H>  4.5   |  25  |  1.05  05-06    134<L>  |  96  |  18  ----------------------------<  143<H>  4.4   |  26  |  1.11    Ca    8.2<L>      07 May 2024 06:54  Ca    9.0      06 May 2024 20:31    TPro  7.0  /  Alb  3.6  /  TBili  0.2  /  DBili  x   /  AST  18  /  ALT  11  /  AlkPhos  90  05-07  TPro  7.3  /  Alb  3.8  /  TBili  0.3  /  DBili  x   /  AST  20  /  ALT  10  /  AlkPhos  101  05-06          PT/INR - ( 06 May 2024 20:31 )   PT: 12.1 sec;   INR: 1.16 ratio         PTT - ( 06 May 2024 20:31 )  PTT:28.7 sec              Urinalysis Basic - ( 07 May 2024 06:54 )    Color: x / Appearance: x / SG: x / pH: x  Gluc: 307 mg/dL / Ketone: x  / Bili: x / Urobili: x   Blood: x / Protein: x / Nitrite: x   Leuk Esterase: x / RBC: x / WBC x   Sq Epi: x / Non Sq Epi: x / Bacteria: x                              12.7   10.72 )-----------( 246      ( 07 May 2024 06:54 )             40.1                         12.8   10.75 )-----------( 272      ( 06 May 2024 20:31 )             39.3     CAPILLARY BLOOD GLUCOSE        Blood Gas Source Venous: Venous (05-06-24 @ 20:12)      MICROBIOLOGY:       RADIOLOGY:    < from: CT Angio Chest PE Protocol w/ IV Cont (05.07.24 @ 02:21) >  IMPRESSION:    No evidence of pulmonary embolism.    Right lower lobe post obstructive pneumonia.    Right-sided perihilar lymph nodes causing mass effect on the adjacent   bronchi.    Left-sided adrenal nodule.      < end of copied text >

## 2024-05-08 LAB
A1C WITH ESTIMATED AVERAGE GLUCOSE RESULT: 6 % — HIGH (ref 4–5.6)
ANION GAP SERPL CALC-SCNC: 13 MMOL/L — SIGNIFICANT CHANGE UP (ref 5–17)
BUN SERPL-MCNC: 23 MG/DL — SIGNIFICANT CHANGE UP (ref 7–23)
CALCIUM SERPL-MCNC: 9.1 MG/DL — SIGNIFICANT CHANGE UP (ref 8.4–10.5)
CHLORIDE SERPL-SCNC: 103 MMOL/L — SIGNIFICANT CHANGE UP (ref 96–108)
CO2 SERPL-SCNC: 24 MMOL/L — SIGNIFICANT CHANGE UP (ref 22–31)
CREAT SERPL-MCNC: 1.09 MG/DL — SIGNIFICANT CHANGE UP (ref 0.5–1.3)
EGFR: 49 ML/MIN/1.73M2 — LOW
ESTIMATED AVERAGE GLUCOSE: 126 MG/DL — HIGH (ref 68–114)
GLUCOSE BLDC GLUCOMTR-MCNC: 119 MG/DL — HIGH (ref 70–99)
GLUCOSE BLDC GLUCOMTR-MCNC: 122 MG/DL — HIGH (ref 70–99)
GLUCOSE BLDC GLUCOMTR-MCNC: 141 MG/DL — HIGH (ref 70–99)
GLUCOSE SERPL-MCNC: 120 MG/DL — HIGH (ref 70–99)
GRAM STN FLD: ABNORMAL
HCT VFR BLD CALC: 39.2 % — SIGNIFICANT CHANGE UP (ref 34.5–45)
HGB BLD-MCNC: 12.2 G/DL — SIGNIFICANT CHANGE UP (ref 11.5–15.5)
LEGIONELLA AG UR QL: NEGATIVE — SIGNIFICANT CHANGE UP
MCHC RBC-ENTMCNC: 29.2 PG — SIGNIFICANT CHANGE UP (ref 27–34)
MCHC RBC-ENTMCNC: 31.1 GM/DL — LOW (ref 32–36)
MCV RBC AUTO: 93.8 FL — SIGNIFICANT CHANGE UP (ref 80–100)
MRSA PCR RESULT.: SIGNIFICANT CHANGE UP
NRBC # BLD: 0 /100 WBCS — SIGNIFICANT CHANGE UP (ref 0–0)
PLATELET # BLD AUTO: 253 K/UL — SIGNIFICANT CHANGE UP (ref 150–400)
POTASSIUM SERPL-MCNC: 4.9 MMOL/L — SIGNIFICANT CHANGE UP (ref 3.5–5.3)
POTASSIUM SERPL-SCNC: 4.9 MMOL/L — SIGNIFICANT CHANGE UP (ref 3.5–5.3)
RBC # BLD: 4.18 M/UL — SIGNIFICANT CHANGE UP (ref 3.8–5.2)
RBC # FLD: 14.7 % — HIGH (ref 10.3–14.5)
S AUREUS DNA NOSE QL NAA+PROBE: SIGNIFICANT CHANGE UP
SODIUM SERPL-SCNC: 140 MMOL/L — SIGNIFICANT CHANGE UP (ref 135–145)
SPECIMEN SOURCE: SIGNIFICANT CHANGE UP
WBC # BLD: 8.03 K/UL — SIGNIFICANT CHANGE UP (ref 3.8–10.5)
WBC # FLD AUTO: 8.03 K/UL — SIGNIFICANT CHANGE UP (ref 3.8–10.5)

## 2024-05-08 PROCEDURE — 99222 1ST HOSP IP/OBS MODERATE 55: CPT | Mod: GC

## 2024-05-08 PROCEDURE — 99221 1ST HOSP IP/OBS SF/LOW 40: CPT

## 2024-05-08 PROCEDURE — 74177 CT ABD & PELVIS W/CONTRAST: CPT | Mod: 26

## 2024-05-08 RX ADMIN — Medication 20 MILLIGRAM(S): at 21:44

## 2024-05-08 RX ADMIN — Medication 3 MILLILITER(S): at 14:41

## 2024-05-08 RX ADMIN — ENOXAPARIN SODIUM 40 MILLIGRAM(S): 100 INJECTION SUBCUTANEOUS at 05:37

## 2024-05-08 RX ADMIN — AZITHROMYCIN 255 MILLIGRAM(S): 500 TABLET, FILM COATED ORAL at 23:31

## 2024-05-08 RX ADMIN — Medication 3 MILLILITER(S): at 23:41

## 2024-05-08 RX ADMIN — CEFTRIAXONE 100 MILLIGRAM(S): 500 INJECTION, POWDER, FOR SOLUTION INTRAMUSCULAR; INTRAVENOUS at 21:45

## 2024-05-08 RX ADMIN — Medication 3 MILLILITER(S): at 05:37

## 2024-05-08 RX ADMIN — Medication 1 TABLET(S): at 11:38

## 2024-05-08 RX ADMIN — Medication 20 MILLIGRAM(S): at 05:37

## 2024-05-08 RX ADMIN — Medication 20 MILLIGRAM(S): at 15:58

## 2024-05-08 NOTE — PHYSICAL THERAPY INITIAL EVALUATION ADULT - LEVEL OF INDEPENDENCE: GAIT, REHAB EVAL
TBD upon functional eval 5/9: Pt declining ambulation training, requesting to return to supine, states that she is afraid she may fall if she tries to walk.

## 2024-05-08 NOTE — PHYSICAL THERAPY INITIAL EVALUATION ADULT - TRANSFER TRAINING, PT EVAL
Pt will transfer independenly with RW or least restrictive AD in 2 weeks. Pt will transfer CGA with RW or least restrictive AD in 2 weeks.

## 2024-05-08 NOTE — PHYSICAL THERAPY INITIAL EVALUATION ADULT - GAIT TRAINING, PT EVAL
Pt will ambulate 200-300 ft independently with RW in 2 weeks Pt will ambulate 20 ft Min A with RW in 2 weeks

## 2024-05-08 NOTE — PHYSICAL THERAPY INITIAL EVALUATION ADULT - PERTINENT HX OF CURRENT PROBLEM, REHAB EVAL
88-year-old female without any prevalent significant past medical condition chief complaint of shortness of breath ongoing the past couple of days or so.  Patient noted to be hypoxic by EMS to 88%.  Patient does not wear any home oxygen at home.    CTA Chest No evidence of pulmonary embolism.Right lower lobe post obstructive pneumonia.Right-sided perihilar lymph nodes causing mass effect on the adjacent bronchi.Left-sided adrenal nodule.    TTE: 1. Left ventricular wall thickness is normal.2. The right ventricle is not well visualized.3. Aortic valve was not well visualized.4. No pericardial effusion seen.   5. Severely limited images. Patient would not complete exam.6. Left ventricular endocardium is not well visualized; however, the left ventricular systolic function appears grossly normal.

## 2024-05-08 NOTE — PROGRESS NOTE ADULT - SUBJECTIVE AND OBJECTIVE BOX
Date of Service: 05-08-24 @ 08:46           CARDIOLOGY     PROGRESS  NOTE   ________________________________________________    CHIEF COMPLAINT:Patient is a 88y old  Female who presents with a chief complaint of sob (07 May 2024 11:48)  doing better  	  REVIEW OF SYSTEMS:  CONSTITUTIONAL: No fever, weight loss, or fatigue  EYES: No eye pain, visual disturbances, or discharge  ENT:  No difficulty hearing, tinnitus, vertigo; No sinus or throat pain  NECK: No pain or stiffness  RESPIRATORY: No cough, wheezing, chills or hemoptysis; No Shortness of Breath  CARDIOVASCULAR: No chest pain, palpitations, passing out, dizziness, or leg swelling  GASTROINTESTINAL: No abdominal or epigastric pain. No nausea, vomiting, or hematemesis; No diarrhea or constipation. No melena or hematochezia.  GENITOURINARY: No dysuria, frequency, hematuria, or incontinence  NEUROLOGICAL: No headaches, memory loss, loss of strength, numbness, or tremors  SKIN: No itching, burning, rashes, or lesions   LYMPH Nodes: No enlarged glands  ENDOCRINE: No heat or cold intolerance; No hair loss  MUSCULOSKELETAL: No joint pain or swelling; No muscle, back, or extremity pain  PSYCHIATRIC: No depression, anxiety, mood swings, or difficulty sleeping  HEME/LYMPH: No easy bruising, or bleeding gums  ALLERGY AND IMMUNOLOGIC: No hives or eczema	    [x ] All others negative	  [ ] Unable to obtain    PHYSICAL EXAM:  T(C): 36.6 (05-08-24 @ 04:44), Max: 37.3 (05-07-24 @ 21:22)  HR: 81 (05-08-24 @ 04:44) (71 - 92)  BP: 139/81 (05-08-24 @ 04:44) (126/80 - 149/57)  RR: 18 (05-08-24 @ 04:44) (18 - 20)  SpO2: 97% (05-08-24 @ 04:44) (95% - 98%)  Wt(kg): --  I&O's Summary    07 May 2024 07:01  -  08 May 2024 07:00  --------------------------------------------------------  IN: 0 mL / OUT: 350 mL / NET: -350 mL        Appearance: Normal	  HEENT:   Normal oral mucosa, PERRL, EOMI	  Lymphatic: No lymphadenopathy  Cardiovascular: Normal S1 S2, No JVD, + murmurs, No edema  Respiratory: rhonchi/ wheeze  Gastrointestinal:  Soft, Non-tender, + BS	  Skin: No rashes, No ecchymoses, No cyanosis	  Neurologic: Non-focal  Extremities: Normal range of motion, No clubbing, cyanosis or edema  Vascular: Peripheral pulses palpable 2+ bilaterally    MEDICATIONS  (STANDING):  albuterol/ipratropium for Nebulization 3 milliLiter(s) Nebulizer every 6 hours  azithromycin  IVPB 500 milliGRAM(s) IV Intermittent every 24 hours  cefTRIAXone   IVPB 1000 milliGRAM(s) IV Intermittent every 24 hours  dextrose 10% Bolus 125 milliLiter(s) IV Bolus once  dextrose 5%. 1000 milliLiter(s) (50 mL/Hr) IV Continuous <Continuous>  dextrose 5%. 1000 milliLiter(s) (100 mL/Hr) IV Continuous <Continuous>  dextrose 50% Injectable 25 Gram(s) IV Push once  dextrose 50% Injectable 12.5 Gram(s) IV Push once  enoxaparin Injectable 40 milliGRAM(s) SubCutaneous every 24 hours  glucagon  Injectable 1 milliGRAM(s) IntraMuscular once  influenza  Vaccine (HIGH DOSE) 0.7 milliLiter(s) IntraMuscular once  insulin lispro (ADMELOG) corrective regimen sliding scale   SubCutaneous three times a day before meals  methylPREDNISolone sodium succinate Injectable 20 milliGRAM(s) IV Push three times a day  multivitamin 1 Tablet(s) Oral daily      TELEMETRY: 	    ECG:  	  RADIOLOGY:  OTHER: 	  	  LABS:	 	    CARDIAC MARKERS:                        12.2   8.03  )-----------( 253      ( 08 May 2024 07:15 )             39.2     05-08    140  |  103  |  23  ----------------------------<  120<H>  4.9   |  24  |  1.09    Ca    9.1      08 May 2024 07:18    TPro  7.0  /  Alb  3.6  /  TBili  0.2  /  DBili  x   /  AST  18  /  ALT  11  /  AlkPhos  90  05-07    proBNP:   Lipid Profile:   HgA1c:   TSH:   PT/INR - ( 06 May 2024 20:31 )   PT: 12.1 sec;   INR: 1.16 ratio         PTT - ( 06 May 2024 20:31 )  PTT:28.7 sec    < from: TTE W or WO Ultrasound Enhancing Agent (05.07.24 @ 10:14) >   1. Left ventricular wall thickness is normal.   2. The right ventricle is not well visualized.   3. Aortic valve was not well visualized.   4. No pericardial effusion seen.   5. Severely limited images.Patient would not complete exam.   6. Left ventricular endocardium is not well visualized; however, the left ventricular systolic function appears grossly normal.      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   - In the mean time can be treated for the post obstructive pneumonia  - Would treat with ceftriaxone + Azithromycin for community acquired pneumonia.   - Sputum culture  - MRSA nasal swab   - Urine Legionella   - No indication for steroids at this time.   - Titrate oxygen to 88-92%   - Use humidified oxygen  - out of bed to chair and early ambulation as able  - Incentive spirometry  - physical therapy  - Aspiration precautions  - DVT ppx     < from: TTE W or WO Ultrasound Enhancing Agent (05.07.24 @ 10:14) >   1. Left ventricular wall thickness is normal.   2. The right ventricle is not well visualized.   3. Aortic valve was not well visualized.   4. No pericardial effusion seen.   5. Severely limited images.Patient would not complete exam.   6. Left ventricular endocardium is not well visualized; however, the left ventricular systolic function appears grossly normal.      Assessment and plan  ---------------------------  88-year-old female without any prevalent significant past medical condition chief complaint of shortness of breath ongoing the past couple of days or so.  Patient noted to be hypoxic by EMS to 88%.  Patient does not wear any home oxygen at home.  Patient present with aide who also has paperwork showing she is DNR DNI/with the [facility] form.  pt with hypoxia ?etiology  r/o PE/pneumoniae  awaiting CTA of the chest  dvt prophylaxis  continue iv abx , CTA noted  TTE noted  pulmonary eval noted

## 2024-05-08 NOTE — PHYSICAL THERAPY INITIAL EVALUATION ADULT - BED MOBILITY TRAINING, PT EVAL
Pt will perform all bed mobility in 2 weeks independently Pt will perform all bed mobility in 2 weeks CGA

## 2024-05-08 NOTE — PHYSICAL THERAPY INITIAL EVALUATION ADULT - NSPTDISCHREC_GEN_A_CORE
TBD upon functional eval. anticipate a return to [facility] with assist for mobility as prior. home PT Return to [facility] with 24/7 crae as prior to admission. Pt owns all required DME./Home PT

## 2024-05-08 NOTE — CONSULT NOTE ADULT - ASSESSMENT
Impression:    Sacral/bilateral Buttocks deep tissue injury present on admission  Incontinence of bowel and bladder  Incontinence Dermatitis    Recommend:  1.) topical therapy: sacral/buttock injury - cleanse with incontinence cleanser, pat dry, apply Nieves ointment BID and PRN for incontinent episodes  2.) Incontinence Management - incontinence cleanser, pads, pericare BID  3.) Maintain on an alternating air with low air loss surface  4.) Turn and reposition Q 2 hours  5.) Nutrition optimization  6.) Offload heels/feet with complete cair air fluidized boots; ensure that the soles of the feet are not resting on the foot board of the bed.  7.) chair cushion for chair sitting    Care as per medicine. Will not actively follow but will remain available. Please recall for new issues or deterioration.  Upon discharge f[facility] [address] [place] [address] [phone]  Thank you for this consult  Discussed with clinical nurse  Lilly Canada, GORDO-C, CWOCN via TEAMS

## 2024-05-08 NOTE — SWALLOW BEDSIDE ASSESSMENT ADULT - SWALLOW EVAL: DIAGNOSIS
Consult received and appreciated. Chart reviewed. Pt pending CT Abdomen/Pelvis, therefore will defer bedside swallow evaluation at this time. D/w NP Meena. Will f/u as medically appropriate.

## 2024-05-08 NOTE — PHYSICAL THERAPY INITIAL EVALUATION ADULT - ADDITIONAL COMMENTS
as per pt and aide:. Pt was at [facility] assisted living facility. has been functioning primarily as a bed to WC transfer for the past few months. Pt states prior to that pt was ambulating with PT program into hallway with assistance and RW. has been in [facility] for the past year and a half and has home health aide at bedside that cares for her.

## 2024-05-08 NOTE — PROGRESS NOTE ADULT - SUBJECTIVE AND OBJECTIVE BOX
---___---___---___---___---___---___ ---___---___---___---___---___---___---___---___---                  M E D I C A L   A T T E N D I N G   P R O G R E S S   N O T E  ---___---___---___---___---___---___ ---___---___---___---___---___---___---___---___---        ================================================    ++CHIEF COMPLAINT:   Patient is a 88y old  Female who presents with a chief complaint of sob (08 May 2024 16:31)      Pneumonia due to infectious organism      ---___---___---___---___---___---  PAST MEDICAL / Surgical  HISTORY:  PAST MEDICAL & SURGICAL HISTORY:  No pertinent past medical history      No significant past surgical history          ---___---___---___---___---___---  FAMILY HISTORY:   FAMILY HISTORY:        ---___---___---___---___---___---  ALLERGIES:   Allergies    No Known Allergies    Intolerances        ---___---___---___---___---___---  MEDICATIONS:  MEDICATIONS  (STANDING):  albuterol/ipratropium for Nebulization 3 milliLiter(s) Nebulizer every 6 hours  azithromycin  IVPB 500 milliGRAM(s) IV Intermittent every 24 hours  cefTRIAXone   IVPB 1000 milliGRAM(s) IV Intermittent every 24 hours  dextrose 10% Bolus 125 milliLiter(s) IV Bolus once  dextrose 5%. 1000 milliLiter(s) (50 mL/Hr) IV Continuous <Continuous>  dextrose 5%. 1000 milliLiter(s) (100 mL/Hr) IV Continuous <Continuous>  dextrose 50% Injectable 25 Gram(s) IV Push once  dextrose 50% Injectable 12.5 Gram(s) IV Push once  enoxaparin Injectable 40 milliGRAM(s) SubCutaneous every 24 hours  glucagon  Injectable 1 milliGRAM(s) IntraMuscular once  influenza  Vaccine (HIGH DOSE) 0.7 milliLiter(s) IntraMuscular once  insulin lispro (ADMELOG) corrective regimen sliding scale   SubCutaneous three times a day before meals  methylPREDNISolone sodium succinate Injectable 20 milliGRAM(s) IV Push three times a day  multivitamin 1 Tablet(s) Oral daily    MEDICATIONS  (PRN):  dextrose Oral Gel 15 Gram(s) Oral once PRN Blood Glucose LESS THAN 70 milliGRAM(s)/deciliter      ---___---___---___---___---___---  REVIEW OF SYSTEM:    GEN: no fever, no chills, no pain  RESP: no SOB, no cough, no sputum  CVS: no chest pain, no palpitations, no edema  GI: no abdominal pain, no nausea, no vomiting, no constipation, no diarrhea  : no dysurea, no frequency, no hematurea  Neuro: no headache, no dizziness  PSYCH: no anxiety, no depression  Derm : no itching, no rash    ---___---___---___---___---___---  VITAL SIGNS:  88y , CAPILLARY BLOOD GLUCOSE      POCT Blood Glucose.: 141 mg/dL (08 May 2024 11:55)    T(C): 36.6 (24 @ 20:00), Max: 36.8 (24 @ 11:26)  HR: 72 (24 @ 20:00) (72 - 82)  BP: 150/94 (24 @ 20:00) (139/81 - 178/74)  RR: 18 (24 @ 20:00) (18 - 18)  SpO2: 97% (24 @ 20:00) (93% - 97%)  ---___---___---___---___---___---  PHYSICAL EXAM:    GEN: A&O X 3 , NAD , comfortable  HEENT: NCAT, PERRL, MMM, hearing intact  Neck: supple , no JVD  CVS: S1S2 , regular , No M/R/G appreciated  PULM: CTA B/L,  no W/R/R appreciated  ABD.: soft. non tender, non distended,  bowel sounds present  Extrem: intact pulses , no edema   Derm: No rash , no ecchymoses  PSYCH : normal mood,  no delusion not anxious     ---___---___---___---___---___---            LAB AND IMAGIN.2   8.03  )-----------( 253      ( 08 May 2024 07:15 )             39.2                   140  |  103  |  23  ----------------------------<  120<H>  4.9   |  24  |  1.09    Ca    9.1      08 May 2024 07:18    TPro  7.0  /  Alb  3.6  /  TBili  0.2  /  DBili  x   /  AST  18  /  ALT  11  /  AlkPhos  90                              Urinalysis Basic - ( 08 May 2024 07:18 )    Color: x / Appearance: x / SG: x / pH: x  Gluc: 120 mg/dL / Ketone: x  / Bili: x / Urobili: x   Blood: x / Protein: x / Nitrite: x   Leuk Esterase: x / RBC: x / WBC x   Sq Epi: x / Non Sq Epi: x / Bacteria: x        [All pertinent / recent Imaging reviewed]         ---___---___---___---___---___---___ ---___---___---___---___---                         A S S E S S M E N T   A N D   P L A N :      HEALTH ISSUES - PROBLEM Dx:  88-year-old female without any prevalent significant past medical condition chief complaint of shortness of breath ongoing the past couple of days or so.  Patient noted to be hypoxic by EMS to 88%.  Patient does not wear any home oxygen at home.  Patient present with aide who also has paperwork showing she is DNR DNI/with the [employer] form.  pt with hypoxia ?etiology  r/o PE/pneumoniae  hold bp meds/ npo for now  start on high flow  keep npo for now  dvt prophylaxis  will consider emperic abx/steroid  TTE       ACC: [id number] EXAM:  CT ANGIO CHEST PULM ART [facility]   ORDERED BY: CAROLYN NOEL     PROCEDURE DATE:  2024          INTERPRETATION:  CLINICAL INFORMATION: Wells score greater than 4,   concern for pulmonary embolus    COMPARISON: T angiography of chest    CONTRAST/COMPLICATIONS:  IV Contrast: Omnipaque 350  60 cc administered   40 cc discarded  Oral Contrast: NONE  Complications: None reported at time of study completion    PROCEDURE:  CT Angiogram of the chest was obtained with intravenous contrast. Three   dimensional maximum intensity projection (MIP) images were generated.    FINDINGS:    PULMONARY ANGIOGRAM: There is no evidence of pulmonary embolus. Patchy   subsegmental areas of airspace consolidation within the right lower lobe;   most likely developing pneumonia.    LYMPH NODES: Right-sided perihilar enlarged lymph nodes measuring up to   1.5 cm in the short axis. These causing significant mass effect on the   adjacent bronchi. Proximal right lower lobe bronchial branches are nearly   completely obliterated by the mass effect of the adjacent lymphadenopathy.    HEART/VASCULATURE: Moderate atherosclerotic calcifications of the aorta   and coronary arteries..    AIRWAYS/LUNGS/PLEURA: Right-sided perihilar opacity with right lower lobe   multifocal opacification.    UPPER ABDOMEN: 1.2 x 1.2 cm left adrenal nodule.    BONES/SOFT TISSUES: Multilevel degenerative change of the spine.    IMPRESSION:    No evidence of pulmonary embolism.    Right lower lobe post obstructive pneumonia.    Right-sided perihilar lymph nodes causing mass effect on the adjacent   bronchi.    Left-sided adrenal nodule.        --- End of Report ---          ARON HELLER MD; Resident Radiologist  This document has been electronically signed.   CANDIDO TAI MD; Attending Radiologist  This document has been electronically signed. May  7 2024  3:09AM                      -GI/DVT Prophylaxis.    --------------------------------------------  Case discussed with   Education given on   ___________________________  Thank you,  Kole Prieto  [phone]

## 2024-05-08 NOTE — CONSULT NOTE ADULT - SUBJECTIVE AND OBJECTIVE BOX
Wound Surgery Consult Note:    HPI:  88-year-old female without any prevalent significant past medical condition chief complaint of shortness of breath ongoing the past couple of days or so.  Patient noted to be hypoxic by EMS to 88%.  Patient does not wear any home oxygen at home.  Patient present with aide who also has paperwork showing she is DNR DNI/with the [employer] form.    PAST MEDICAL & SURGICAL HISTORY:  No pertinent past medical history  No significant past surgical history    REVIEW OF SYSTEMS:  CONSTITUTIONAL: No fever, weight loss, or fatigue  EYES: No eye pain, visual disturbances, or discharge  ENT:  No difficulty hearing, tinnitus, vertigo; No sinus or throat pain  NECK: No pain or stiffness  RESPIRATORY: No cough, wheezing, chills or hemoptysis; + Shortness of Breath  CARDIOVASCULAR: No chest pain, palpitations, passing out, dizziness, or leg swelling  GASTROINTESTINAL: No abdominal or epigastric pain. No nausea, vomiting, or hematemesis; No diarrhea or constipation. No melena or hematochezia.  GENITOURINARY: No dysuria, frequency, hematuria, + incontinence  NEUROLOGICAL: No headaches, memory loss, loss of strength, numbness, or tremors  SKIN: No itching, burning, or lesions   LYMPH Nodes: No enlarged glands  ENDOCRINE: No heat or cold intolerance; No hair loss  MUSCULOSKELETAL: No joint pain or swelling; No muscle, back, or extremity pain  PSYCHIATRIC: No depression, anxiety, mood swings, or difficulty sleeping  HEME/LYMPH: No easy bruising, or bleeding gums  ALLERGY AND IMMUNOLOGIC: No hives or eczema	    MEDICATIONS  (STANDING):  albuterol/ipratropium for Nebulization 3 milliLiter(s) Nebulizer every 6 hours  azithromycin  IVPB 500 milliGRAM(s) IV Intermittent every 24 hours  cefTRIAXone   IVPB 1000 milliGRAM(s) IV Intermittent every 24 hours  dextrose 10% Bolus 125 milliLiter(s) IV Bolus once  dextrose 5%. 1000 milliLiter(s) (50 mL/Hr) IV Continuous <Continuous>  dextrose 5%. 1000 milliLiter(s) (100 mL/Hr) IV Continuous <Continuous>  dextrose 50% Injectable 25 Gram(s) IV Push once  dextrose 50% Injectable 12.5 Gram(s) IV Push once  enoxaparin Injectable 40 milliGRAM(s) SubCutaneous every 24 hours  glucagon  Injectable 1 milliGRAM(s) IntraMuscular once  influenza  Vaccine (HIGH DOSE) 0.7 milliLiter(s) IntraMuscular once  insulin lispro (ADMELOG) corrective regimen sliding scale   SubCutaneous three times a day before meals  methylPREDNISolone sodium succinate Injectable 20 milliGRAM(s) IV Push three times a day  multivitamin 1 Tablet(s) Oral daily    MEDICATIONS  (PRN):  dextrose Oral Gel 15 Gram(s) Oral once PRN Blood Glucose LESS THAN 70 milliGRAM(s)/deciliter    Allergies    No Known Allergies    Intolerances    SOCIAL HISTORY:  , Denies smoking, ETOH, drugs    FAMILY HISTORY: no pertinent family history among first degree relatives    Vital Signs Last 24 Hrs  T(C): 36.8 (08 May 2024 11:26), Max: 37.3 (07 May 2024 21:22)  T(F): 98.2 (08 May 2024 11:26), Max: 99.1 (07 May 2024 21:22)  HR: 81 (08 May 2024 11:26) (81 - 81)  BP: 141/80 (08 May 2024 11:26) (139/81 - 149/57)  BP(mean): --  RR: 18 (08 May 2024 11:26) (18 - 18)  SpO2: 93% (08 May 2024 11:26) (93% - 97%)    Parameters below as of 08 May 2024 11:26  Patient On (Oxygen Delivery Method): nasal cannula    Physical Exam:  General: alert, obese  Ophthamology: sclera clear  ENMT: moist mucous membranes, trachea midline  Respiratory: equal chest rise with respirations  Gastrointestinal: soft NT/ND  Neurology: verbal, following commands  Psych: calm, uncooperative  Musculoskeletal: no contractures  Vascular: BLE edema equal  Skin:  Sacral/bilateral buttocks with deep red discolored, macerated intact skin in and around the gluteal cleft  L 4cm X W 4cm x D none, no drainage  No odor, erythema, increased warmth, tenderness, induration, fluctuance    LABS:  05-08    140  |  103  |  23  ----------------------------<  120<H>  4.9   |  24  |  1.09    Ca    9.1      08 May 2024 07:18    TPro  7.0  /  Alb  3.6  /  TBili  0.2  /  DBili  x   /  AST  18  /  ALT  11  /  AlkPhos  90  05-07                          12.2   8.03  )-----------( 253      ( 08 May 2024 07:15 )             39.2     PT/INR - ( 06 May 2024 20:31 )   PT: 12.1 sec;   INR: 1.16 ratio         PTT - ( 06 May 2024 20:31 )  PTT:28.7 sec  Urinalysis Basic - ( 08 May 2024 07:18 )    Color: x / Appearance: x / SG: x / pH: x  Gluc: 120 mg/dL / Ketone: x  / Bili: x / Urobili: x   Blood: x / Protein: x / Nitrite: x   Leuk Esterase: x / RBC: x / WBC x   Sq Epi: x / Non Sq Epi: x / Bacteria: x         Wound Surgery Consult Note:    HPI:  88-year-old female without any prevalent significant past medical condition chief complaint of shortness of breath ongoing the past couple of days or so.  Patient noted to be hypoxic by EMS to 88%.  Patient does not wear any home oxygen at home.  Patient present with aide who also has paperwork showing she is DNR DNI/with the [employer] form.    PAST MEDICAL & SURGICAL HISTORY:  No pertinent past medical history  No significant past surgical history    REVIEW OF SYSTEMS:  CONSTITUTIONAL: No fever, weight loss, or fatigue  EYES: No eye pain, visual disturbances, or discharge  ENT:  No difficulty hearing, tinnitus, vertigo; No sinus or throat pain  NECK: No pain or stiffness  RESPIRATORY: No cough, wheezing, chills or hemoptysis; + Shortness of Breath  CARDIOVASCULAR: No chest pain, palpitations, passing out, dizziness, or leg swelling  GASTROINTESTINAL: No abdominal or epigastric pain. No nausea, vomiting, or hematemesis; No diarrhea or constipation. No melena or hematochezia.  GENITOURINARY: No dysuria, frequency, hematuria, + incontinence  NEUROLOGICAL: No headaches, memory loss, loss of strength, numbness, or tremors  SKIN: No itching, burning, or lesions   LYMPH Nodes: No enlarged glands  ENDOCRINE: No heat or cold intolerance; No hair loss  MUSCULOSKELETAL: No joint pain or swelling; No muscle, back, or extremity pain  PSYCHIATRIC: No depression, anxiety, mood swings, or difficulty sleeping  HEME/LYMPH: No easy bruising, or bleeding gums  ALLERGY AND IMMUNOLOGIC: No hives or eczema	    MEDICATIONS  (STANDING):  albuterol/ipratropium for Nebulization 3 milliLiter(s) Nebulizer every 6 hours  azithromycin  IVPB 500 milliGRAM(s) IV Intermittent every 24 hours  cefTRIAXone   IVPB 1000 milliGRAM(s) IV Intermittent every 24 hours  dextrose 10% Bolus 125 milliLiter(s) IV Bolus once  dextrose 5%. 1000 milliLiter(s) (50 mL/Hr) IV Continuous <Continuous>  dextrose 5%. 1000 milliLiter(s) (100 mL/Hr) IV Continuous <Continuous>  dextrose 50% Injectable 25 Gram(s) IV Push once  dextrose 50% Injectable 12.5 Gram(s) IV Push once  enoxaparin Injectable 40 milliGRAM(s) SubCutaneous every 24 hours  glucagon  Injectable 1 milliGRAM(s) IntraMuscular once  influenza  Vaccine (HIGH DOSE) 0.7 milliLiter(s) IntraMuscular once  insulin lispro (ADMELOG) corrective regimen sliding scale   SubCutaneous three times a day before meals  methylPREDNISolone sodium succinate Injectable 20 milliGRAM(s) IV Push three times a day  multivitamin 1 Tablet(s) Oral daily    MEDICATIONS  (PRN):  dextrose Oral Gel 15 Gram(s) Oral once PRN Blood Glucose LESS THAN 70 milliGRAM(s)/deciliter    Allergies    No Known Allergies    Intolerances    SOCIAL HISTORY:  , Denies smoking, ETOH, drugs    FAMILY HISTORY: no pertinent family history among first degree relatives    Vital Signs Last 24 Hrs  T(C): 36.8 (08 May 2024 11:26), Max: 37.3 (07 May 2024 21:22)  T(F): 98.2 (08 May 2024 11:26), Max: 99.1 (07 May 2024 21:22)  HR: 81 (08 May 2024 11:26) (81 - 81)  BP: 141/80 (08 May 2024 11:26) (139/81 - 149/57)  BP(mean): --  RR: 18 (08 May 2024 11:26) (18 - 18)  SpO2: 93% (08 May 2024 11:26) (93% - 97%)    Parameters below as of 08 May 2024 11:26  Patient On (Oxygen Delivery Method): nasal cannula    Physical Exam:  General: alert, obese  Ophthamology: sclera clear  ENMT: moist mucous membranes, trachea midline  Respiratory: equal chest rise with respirations  Gastrointestinal: soft NT/ND  Neurology: verbal, following commands  Psych: calm, uncooperative  Musculoskeletal: no contractures  Vascular: BLE edema equal  Skin:  Sacral/bilateral buttocks with deep red discolored, macerated intact skin in and around the gluteal cleft  L 4cm X W 4cm x D none, no drainage  No odor, erythema, increased warmth, tenderness, induration, fluctuance  Right hip 100% intact, non discolored    LABS:  05-08    140  |  103  |  23  ----------------------------<  120<H>  4.9   |  24  |  1.09    Ca    9.1      08 May 2024 07:18    TPro  7.0  /  Alb  3.6  /  TBili  0.2  /  DBili  x   /  AST  18  /  ALT  11  /  AlkPhos  90  05-07                          12.2   8.03  )-----------( 253      ( 08 May 2024 07:15 )             39.2     PT/INR - ( 06 May 2024 20:31 )   PT: 12.1 sec;   INR: 1.16 ratio         PTT - ( 06 May 2024 20:31 )  PTT:28.7 sec  Urinalysis Basic - ( 08 May 2024 07:18 )    Color: x / Appearance: x / SG: x / pH: x  Gluc: 120 mg/dL / Ketone: x  / Bili: x / Urobili: x   Blood: x / Protein: x / Nitrite: x   Leuk Esterase: x / RBC: x / WBC x   Sq Epi: x / Non Sq Epi: x / Bacteria: x

## 2024-05-09 LAB
GLUCOSE BLDC GLUCOMTR-MCNC: 120 MG/DL — HIGH (ref 70–99)
GLUCOSE BLDC GLUCOMTR-MCNC: 88 MG/DL — SIGNIFICANT CHANGE UP (ref 70–99)
GLUCOSE BLDC GLUCOMTR-MCNC: 90 MG/DL — SIGNIFICANT CHANGE UP (ref 70–99)
GLUCOSE BLDC GLUCOMTR-MCNC: 98 MG/DL — SIGNIFICANT CHANGE UP (ref 70–99)

## 2024-05-09 RX ORDER — ASCORBIC ACID 60 MG
500 TABLET,CHEWABLE ORAL DAILY
Refills: 0 | Status: DISCONTINUED | OUTPATIENT
Start: 2024-05-09 | End: 2024-05-15

## 2024-05-09 RX ADMIN — Medication 3 MILLILITER(S): at 11:13

## 2024-05-09 RX ADMIN — Medication 3 MILLILITER(S): at 15:00

## 2024-05-09 NOTE — DIETITIAN INITIAL EVALUATION ADULT - ENERGY INTAKE
Pt reports good intake in-house, noted with 25-50% intake x1 meal per flowsheet. Unsure if/Fair (50-75%) Pt reports good intake in-house, noted with 25-50% intake x1 meal per flowsheet. RD observed snacks from home at bedside. Declines protein shakes, amenable to receiving high protein gelatin. Obtained food preferences, will honor as able./Fair (50-75%)

## 2024-05-09 NOTE — DIETITIAN INITIAL EVALUATION ADULT - ADD RECOMMEND
1) Continue regular diet free of therapeutic restrictions, defer diet texture to team/SLP as able to perform swallow evaluation as pt reporting no hx of dysphagia  2) Monitor nutritional intake, diet tolerance, labs, hydration, GI function, skin integrity and wt trends  1) Continue regular diet free of therapeutic restrictions, defer diet texture to team/SLP as able to perform swallow evaluation as pt reporting no hx of dysphagia  2) Recommend continue daily multivitamin and addition of vitamin C daily to promote wound healing in setting of deep tissue injury    3) Monitor nutritional intake, diet tolerance, labs, hydration, GI function, skin integrity and wt trends

## 2024-05-09 NOTE — DIETITIAN INITIAL EVALUATION ADULT - NS FNS WEIGHT CHANGE REASON
Pt reporting UBW of 148-150 pounds, denies any recent wt changes or changes in po intake.    Current dosing wt 155 pounds (5/6 - stated)  RD obtained bedscale wt 162 pounds (5/9), noted potential bedscale inaccuracy vs. pillows/blankets/miscellaneous items on bed  No recent wt hx available in [facility]     Pt with possible wt gain from stated UBW. RD to continue to monitor wt as able  IBW: 115 pounds

## 2024-05-09 NOTE — DIETITIAN INITIAL EVALUATION ADULT - NSFNSPHYEXAMSKINFT_GEN_A_CORE
Pressure Injury 1: Right:, Hip, Suspected deep tissue injury  Pressure Injury 2: none, none  Pressure Injury 3: none, none  Pressure Injury 4: none, none  Pressure Injury 5: none, none  Pressure Injury 6: none, none  Pressure Injury 7: none, none  Pressure Injury 8: none, none  Pressure Injury 9: none, none  Pressure Injury 10: none, none  Pressure Injury 11: none, none Per flowsheet: Pressure Injury 1: Right:, Hip, Suspected deep tissue injury  Per wound care 5/8: sacrum/bilateral buttocks deep tissue injury

## 2024-05-09 NOTE — CONSULT NOTE ADULT - ASSESSMENT
88 year old female with unremarkable medical history who presents with dyspnea.  Patient presents with dyspnea, found to be hypoxic and requiring treatment for pneumonia.      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  However, upon my interview, patient does not even "want to hear about a mass in her stomach" and refuses any further workup or treatment of the findings on her CT.  She is aware this could be an underlying malignancy, that if left untreated, could ultimately result in her death  -will sign off at this time; please call back if patient wishes to pursue further workup and optimized for endoscopic procedure  -remainder per primary team    Note incomplete until finalized by attending signature/attestation.    Tung Rosales  GI/Hepatology Fellow    MONDAY-FRIDAY 8AM-5PM:  Pager# [phone] ([facility]) or [phone] ([facility])    NON-URGENT CONSULTS:  Please email [email or web address]  AT NIGHT AND ON WEEKENDS:  Contact on-call GI fellow via answering service ([phone]) from 5pm-8am and on weekends/holidays

## 2024-05-09 NOTE — CONSULT NOTE ADULT - SUBJECTIVE AND OBJECTIVE BOX
HPI:  BILLY BERMUDEZ is a 88 year old female with unremarkable medical history who presents with dyspnea.    Patient presents with dyspnea, found to be hypoxic and requiring treatment for pneumonia.  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  However, upon my interview, patient does not even "want to hear about a mass in her stomach" and refuses any further workup or treatment of the findings on her CT.    ROS:   General:  No fevers, chills, night sweats, fatigue  Eyes:  Good vision, no reported pain  ENT:  No sore throat, pain, runny nose  CV:  No pain, palpitations  Pulm:  No dyspnea, cough  GI:  See HPI, otherwise negative  :  No  incontinence, nocturia  Muscle:  No pain, weakness  Neuro:  No memory problems  Psych:  No insomnia, mood problems, depression  Endocrine:  No polyuria, polydipsia, cold/heat intolerance  Heme:  No petechiae, ecchymosis, easy bruisability  Skin:  No rash    PMHX/PSHX:    No pertinent past medical history    No significant past surgical history      Allergies:  No Known Allergies      Home Medications: reviewed  Hospital Medications:  albuterol/ipratropium for Nebulization 3 milliLiter(s) Nebulizer every 6 hours  ascorbic acid 500 milliGRAM(s) Oral daily  azithromycin  IVPB 500 milliGRAM(s) IV Intermittent every 24 hours  cefTRIAXone   IVPB 1000 milliGRAM(s) IV Intermittent every 24 hours  dextrose 10% Bolus 125 milliLiter(s) IV Bolus once  dextrose 5%. 1000 milliLiter(s) IV Continuous <Continuous>  dextrose 5%. 1000 milliLiter(s) IV Continuous <Continuous>  dextrose 50% Injectable 25 Gram(s) IV Push once  dextrose 50% Injectable 12.5 Gram(s) IV Push once  dextrose Oral Gel 15 Gram(s) Oral once PRN  enoxaparin Injectable 40 milliGRAM(s) SubCutaneous every 24 hours  glucagon  Injectable 1 milliGRAM(s) IntraMuscular once  influenza  Vaccine (HIGH DOSE) 0.7 milliLiter(s) IntraMuscular once  insulin lispro (ADMELOG) corrective regimen sliding scale   SubCutaneous three times a day before meals  methylPREDNISolone sodium succinate Injectable 20 milliGRAM(s) IV Push three times a day  multivitamin 1 Tablet(s) Oral daily      Social History:   Tobacco: denies  Alcohol: denies  Recreational drugs: denies    Family history:      Denies family history of colon cancer/polyps, stomach cancer/polyps, pancreatic cancer/masses, liver cancer/disease, ovarian cancer and endometrial cancer.    PHYSICAL EXAM:   Vital Signs:  Vital Signs Last 24 Hrs  T(C): 36.3 (09 May 2024 14:37), Max: 36.8 (08 May 2024 16:00)  T(F): 97.4 (09 May 2024 14:37), Max: 98.3 (08 May 2024 16:00)  HR: 95 (09 May 2024 14:37) (69 - 95)  BP: 166/98 (09 May 2024 14:37) (148/71 - 178/74)  BP(mean): --  RR: 18 (09 May 2024 14:37) (18 - 18)  SpO2: 91% (09 May 2024 14:37) (89% - 98%)    Parameters below as of 09 May 2024 14:37  Patient On (Oxygen Delivery Method): nasal cannula  O2 Flow (L/min): 4    Daily     Daily     GENERAL: no acute distress  NEURO: alert  HEENT: NCAT, no conjunctival pallor appreciated  CHEST: no respiratory distress, no accessory muscle use  CARDIAC: regular rate, +S1/S2  ABDOMEN: soft, nontender, no rebound or guarding  EXTREMITIES: warm, well perfused  SKIN: no lesions noted    LABS: reviewed                        12.2   8.03  )-----------( 253      ( 08 May 2024 07:15 )             39.2     05-08    140  |  103  |  23  ----------------------------<  120<H>  4.9   |  24  |  1.09    Ca    9.1      08 May 2024 07:18          Culture - Sputum (collected 08 May 2024 12:03)  Source: .Sputum Sputum  Gram Stain (08 May 2024 20:53):    Few Squamous epithelial cells per low power field    Few polymorphonuclear leukocytes per low power field    Few Gram positive cocci in pairs per oil power field  Preliminary Report (09 May 2024 14:53):    Normal Respiratory Kailee present    Culture - Blood (collected 06 May 2024 20:33)  Source: .Blood Blood-Peripheral  Preliminary Report (09 May 2024 03:01):    No growth at 48 Hours    Culture - Blood (collected 06 May 2024 20:17)  Source: .Blood Blood-Peripheral  Preliminary Report (09 May 2024 03:01):    No growth at 48 Hours        Diagnostic Studies: see sunrise for full report

## 2024-05-09 NOTE — DIETITIAN INITIAL EVALUATION ADULT - PERSON TAUGHT/METHOD
Provided education on meeting adequate protein-energy needs, emphasized the importance of adequate calorie and protein intake to aid in wound healing. Encouraged prioritizing protein foods and consuming adequate amounts of protein at each meal for preservation of lean muscle mass. Obtained food preferences, will honor as able. Made aware RD remains available./verbal instruction/patient instructed

## 2024-05-09 NOTE — DIETITIAN INITIAL EVALUATION ADULT - ORAL INTAKE PTA/DIET HISTORY
PTA per pt  -Intake: good typical intake, consumes 3 meals/day; does not follow any therapeutic diets at home   -Chewing/Swallowing: denies hx of difficulty (ordered for purees in-house)   -Allergies/Intolerances: Confirms NKFA   -Vitamins/Supplements: multivitamin per home medications

## 2024-05-09 NOTE — PROGRESS NOTE ADULT - SUBJECTIVE AND OBJECTIVE BOX
---___---___---___---___---___---___ ---___---___---___---___---___---___---___---___---                  M E D I C A L   A T T E N D I N G   P R O G R E S S   N O T E  ---___---___---___---___---___---___ ---___---___---___---___---___---___---___---___---        ================================================    ++CHIEF COMPLAINT:   Patient is a 88y old  Female who presents with a chief complaint of sob (09 May 2024 15:44)      Pneumonia due to infectious organism      ---___---___---___---___---___---  PAST MEDICAL / Surgical  HISTORY:  PAST MEDICAL & SURGICAL HISTORY:  No pertinent past medical history      No significant past surgical history          ---___---___---___---___---___---  FAMILY HISTORY:   FAMILY HISTORY:        ---___---___---___---___---___---  ALLERGIES:   Allergies    No Known Allergies    Intolerances        ---___---___---___---___---___---  MEDICATIONS:  MEDICATIONS  (STANDING):  albuterol/ipratropium for Nebulization 3 milliLiter(s) Nebulizer every 6 hours  ascorbic acid 500 milliGRAM(s) Oral daily  azithromycin  IVPB 500 milliGRAM(s) IV Intermittent every 24 hours  cefTRIAXone   IVPB 1000 milliGRAM(s) IV Intermittent every 24 hours  dextrose 10% Bolus 125 milliLiter(s) IV Bolus once  dextrose 5%. 1000 milliLiter(s) (50 mL/Hr) IV Continuous <Continuous>  dextrose 5%. 1000 milliLiter(s) (100 mL/Hr) IV Continuous <Continuous>  dextrose 50% Injectable 25 Gram(s) IV Push once  dextrose 50% Injectable 12.5 Gram(s) IV Push once  enoxaparin Injectable 40 milliGRAM(s) SubCutaneous every 24 hours  glucagon  Injectable 1 milliGRAM(s) IntraMuscular once  influenza  Vaccine (HIGH DOSE) 0.7 milliLiter(s) IntraMuscular once  insulin lispro (ADMELOG) corrective regimen sliding scale   SubCutaneous three times a day before meals  methylPREDNISolone sodium succinate Injectable 20 milliGRAM(s) IV Push three times a day  multivitamin 1 Tablet(s) Oral daily    MEDICATIONS  (PRN):  dextrose Oral Gel 15 Gram(s) Oral once PRN Blood Glucose LESS THAN 70 milliGRAM(s)/deciliter      ---___---___---___---___---___---  REVIEW OF SYSTEM:    GEN: no fever, no chills, no pain  RESP: no SOB, no cough, no sputum  CVS: no chest pain, no palpitations, no edema  GI: no abdominal pain, no nausea, no vomiting, no constipation, no diarrhea  : no dysurea, no frequency, no hematurea  Neuro: no headache, no dizziness  PSYCH: no anxiety, no depression  Derm : no itching, no rash    ---___---___---___---___---___---  VITAL SIGNS:  88y , CAPILLARY BLOOD GLUCOSE      POCT Blood Glucose.: 90 mg/dL (09 May 2024 16:41)    T(C): 36.6 (24 @ 16:13), Max: 36.6 (24 @ 16:13)  HR: 74 (24 @ 16:13) (69 - 95)  BP: 170/83 (24 @ 16:13) (148/71 - 170/83)  RR: 18 (24 @ 16:13) (18 - 18)  SpO2: 97% (24 @ 16:13) (89% - 98%)  ---___---___---___---___---___---  PHYSICAL EXAM:    GEN: A&O X 3 , NAD , comfortable  HEENT: NCAT, PERRL, MMM, hearing intact  Neck: supple , no JVD  CVS: S1S2 , regular , No M/R/G appreciated  PULM: CTA B/L,  no W/R/R appreciated  ABD.: soft. non tender, non distended,  bowel sounds present  Extrem: intact pulses , no edema   Derm: No rash , no ecchymoses  PSYCH : normal mood,  no delusion not anxious     ---___---___---___---___---___---            LAB AND IMAGIN.2   8.03  )-----------( 253      ( 08 May 2024 07:15 )             39.2               05-    140  |  103  |  23  ----------------------------<  120<H>  4.9   |  24  |  1.09    Ca    9.1      08 May 2024 07:18                              Urinalysis Basic - ( 08 May 2024 07:18 )    Color: x / Appearance: x / SG: x / pH: x  Gluc: 120 mg/dL / Ketone: x  / Bili: x / Urobili: x   Blood: x / Protein: x / Nitrite: x   Leuk Esterase: x / RBC: x / WBC x   Sq Epi: x / Non Sq Epi: x / Bacteria: x        [All pertinent / recent Imaging reviewed]         ---___---___---___---___---___---___ ---___---___---___---___---                         A S S E S S M E N T   A N D   P L A N :      HEALTH ISSUES - PROBLEM Dx:  Suspected pulmonary embolism    88-year-old female without any prevalent significant past medical condition chief complaint of shortness of breath ongoing the past couple of days or so.  Patient noted to be hypoxic by EMS to 88%.  Patient does not wear any home oxygen at home.  Patient present with aide who also has paperwork showing she is DNR DNI/with the [employer] form.  pt with hypoxia ?etiology  r/o PE/  +pneumoniae     continue iv abx    pulmonary eval noted, appreciated  oob to chair with assistant as tolerated  losartan 25 mg daily                -GI/DVT Prophylaxis. as ordered    --------------------------------------------  Case discussed with   Education given on   ___________________________  Thank you,  Kole Prieto  [phone]

## 2024-05-09 NOTE — DIETITIAN INITIAL EVALUATION ADULT - PERTINENT MEDS FT
MEDICATIONS  (STANDING):  albuterol/ipratropium for Nebulization 3 milliLiter(s) Nebulizer every 6 hours  azithromycin  IVPB 500 milliGRAM(s) IV Intermittent every 24 hours  cefTRIAXone   IVPB 1000 milliGRAM(s) IV Intermittent every 24 hours  dextrose 10% Bolus 125 milliLiter(s) IV Bolus once  dextrose 5%. 1000 milliLiter(s) (50 mL/Hr) IV Continuous <Continuous>  dextrose 5%. 1000 milliLiter(s) (100 mL/Hr) IV Continuous <Continuous>  dextrose 50% Injectable 25 Gram(s) IV Push once  dextrose 50% Injectable 12.5 Gram(s) IV Push once  enoxaparin Injectable 40 milliGRAM(s) SubCutaneous every 24 hours  glucagon  Injectable 1 milliGRAM(s) IntraMuscular once  influenza  Vaccine (HIGH DOSE) 0.7 milliLiter(s) IntraMuscular once  insulin lispro (ADMELOG) corrective regimen sliding scale   SubCutaneous three times a day before meals  methylPREDNISolone sodium succinate Injectable 20 milliGRAM(s) IV Push three times a day  multivitamin 1 Tablet(s) Oral daily    MEDICATIONS  (PRN):  dextrose Oral Gel 15 Gram(s) Oral once PRN Blood Glucose LESS THAN 70 milliGRAM(s)/deciliter

## 2024-05-09 NOTE — DIETITIAN INITIAL EVALUATION ADULT - REASON INDICATOR FOR ASSESSMENT
Consult indicated by pressure injury of 2 or >  Sources: Medical Record, pt (noted with hx of Dementia but no disorientation per chart, able to participate meaningfully in interview)  Chart reviewed, events noted.

## 2024-05-09 NOTE — DIETITIAN INITIAL EVALUATION ADULT - NSFNSGIASSESSMENTFT_GEN_A_CORE
Denies N/V and diarrhea, endorses constipation. Last BM 1-2 days ago per pt. Accepted prune juice to aid in constipation relief. Not currently ordered for a bowel regimen, consider addition of regimen if constipation persists.

## 2024-05-09 NOTE — DIETITIAN INITIAL EVALUATION ADULT - NSFNSGIIOFT_GEN_A_CORE
I&O's Detail    08 May 2024 07:01  -  09 May 2024 07:00  --------------------------------------------------------  IN:  Total IN: 0 mL    OUT:    Voided (mL): 400 mL  Total OUT: 400 mL    Total NET: -400 mL

## 2024-05-09 NOTE — PROGRESS NOTE ADULT - SUBJECTIVE AND OBJECTIVE BOX
Date of Service: 05-09-24 @ 10:21           CARDIOLOGY     PROGRESS  NOTE   ________________________________________________    CHIEF COMPLAINT:Patient is a 88y old  Female who presents with a chief complaint of sob (08 May 2024 21:22)  doing much better  	  REVIEW OF SYSTEMS:  CONSTITUTIONAL: No fever, weight loss, or fatigue  EYES: No eye pain, visual disturbances, or discharge  ENT:  No difficulty hearing, tinnitus, vertigo; No sinus or throat pain  NECK: No pain or stiffness  RESPIRATORY: No cough, wheezing, chills or hemoptysis; decrease  Shortness of Breath  CARDIOVASCULAR: No chest pain, palpitations, passing out, dizziness, or leg swelling  GASTROINTESTINAL: No abdominal or epigastric pain. No nausea, vomiting, or hematemesis; No diarrhea or constipation. No melena or hematochezia.  GENITOURINARY: No dysuria, frequency, hematuria, or incontinence  NEUROLOGICAL: No headaches, memory loss, loss of strength, numbness, or tremors  SKIN: No itching, burning, rashes, or lesions   LYMPH Nodes: No enlarged glands  ENDOCRINE: No heat or cold intolerance; No hair loss  MUSCULOSKELETAL: No joint pain or swelling; No muscle, back, or extremity pain  PSYCHIATRIC: No depression, anxiety, mood swings, or difficulty sleeping  HEME/LYMPH: No easy bruising, or bleeding gums  ALLERGY AND IMMUNOLOGIC: No hives or eczema	    [x ] All others negative	  [ ] Unable to obtain    PHYSICAL EXAM:  T(C): 36.5 (05-09-24 @ 05:08), Max: 36.8 (05-08-24 @ 11:26)  HR: 69 (05-09-24 @ 05:08) (69 - 82)  BP: 152/74 (05-09-24 @ 05:08) (141/80 - 178/74)  RR: 18 (05-09-24 @ 06:43) (18 - 18)  SpO2: 98% (05-09-24 @ 06:43) (89% - 98%)  Wt(kg): --  I&O's Summary    08 May 2024 07:01  -  09 May 2024 07:00  --------------------------------------------------------  IN: 0 mL / OUT: 400 mL / NET: -400 mL        Appearance: Normal	  HEENT:   Normal oral mucosa, PERRL, EOMI	  Lymphatic: No lymphadenopathy  Cardiovascular: Normal S1 S2, No JVD, + murmurs, No edema  Respiratory:+rhonchi  Psychiatry: A & O x 3, Mood & affect appropriate  Gastrointestinal:  Soft, Non-tender, + BS	  Skin: No rashes, No ecchymoses, No cyanosis	  Neurologic: Non-focal  Extremities: Normal range of motion, No clubbing, cyanosis or edema  Vascular: Peripheral pulses palpable 2+ bilaterally    MEDICATIONS  (STANDING):  albuterol/ipratropium for Nebulization 3 milliLiter(s) Nebulizer every 6 hours  azithromycin  IVPB 500 milliGRAM(s) IV Intermittent every 24 hours  cefTRIAXone   IVPB 1000 milliGRAM(s) IV Intermittent every 24 hours  dextrose 10% Bolus 125 milliLiter(s) IV Bolus once  dextrose 5%. 1000 milliLiter(s) (50 mL/Hr) IV Continuous <Continuous>  dextrose 5%. 1000 milliLiter(s) (100 mL/Hr) IV Continuous <Continuous>  dextrose 50% Injectable 25 Gram(s) IV Push once  dextrose 50% Injectable 12.5 Gram(s) IV Push once  enoxaparin Injectable 40 milliGRAM(s) SubCutaneous every 24 hours  glucagon  Injectable 1 milliGRAM(s) IntraMuscular once  influenza  Vaccine (HIGH DOSE) 0.7 milliLiter(s) IntraMuscular once  insulin lispro (ADMELOG) corrective regimen sliding scale   SubCutaneous three times a day before meals  methylPREDNISolone sodium succinate Injectable 20 milliGRAM(s) IV Push three times a day  multivitamin 1 Tablet(s) Oral daily      TELEMETRY: 	    ECG:  	  RADIOLOGY:  OTHER: 	  	  LABS:	 	    CARDIAC MARKERS:                                12.2   8.03  )-----------( 253      ( 08 May 2024 07:15 )             39.2     05-08    140  |  103  |  23  ----------------------------<  120<H>  4.9   |  24  |  1.09    Ca    9.1      08 May 2024 07:18      proBNP:   Lipid Profile:   HgA1c:   TSH:       < from: TTE W or WO Ultrasound Enhancing Agent (05.07.24 @ 10:14) >   1. Left ventricular wall thickness is normal.   2. The right ventricle is not well visualized.   3. Aortic valve was not well visualized.   4. No pericardial effusion seen.   5. Severely limited images.Patient would not complete exam.   6. Left ventricular endocardium is not well visualized; however, the left ventricular systolic function appears grossly normal.        Assessment and plan  ---------------------------  88-year-old female without any prevalent significant past medical condition chief complaint of shortness of breath ongoing the past couple of days or so.  Patient noted to be hypoxic by EMS to 88%.  Patient does not wear any home oxygen at home.  Patient present with aide who also has paperwork showing she is DNR DNI/with the [employer] form.  pt with hypoxia ?etiology  r/o PE/  +pneumoniae  awaiting CTA of the chest  dvt prophylaxis  continue iv abx , CTA noted  TTE noted  pulmonary eval noted, appreciated  oob to chair with assistant as tolerated  losartan 25 mg daily

## 2024-05-09 NOTE — DIETITIAN INITIAL EVALUATION ADULT - OBTAIN CURRENT WEIGHT
[FreeTextEntry1] : 67 yo female screening colonoscopy with history of GERD symptoms despite medications.  yes

## 2024-05-09 NOTE — DIETITIAN INITIAL EVALUATION ADULT - OTHER INFO
- Pt admitted with shortness of breath, per family medicine (5/8) pulmonary angiogram showed no evidence of PE, likely developing pneumonia  - Wound care (5/8) identified a sacrum/bilateral buttocks deep tissue injury   - Swallow evaluation deferred on 5/8, continues ordered for pureed diet/thin liquids  - No hx of DM per chart, pt with A1C of 6.0% (5/8) indicates good glycemic control with consideration for advanced age, pt also noted on IV solumedrol which may contribute to steroid induced hyperglycemia, ordered for sliding scale insulin in-house as coverage  - Ordered for multivitamin

## 2024-05-10 LAB
CULTURE RESULTS: ABNORMAL
GLUCOSE BLDC GLUCOMTR-MCNC: 104 MG/DL — HIGH (ref 70–99)
GLUCOSE BLDC GLUCOMTR-MCNC: 89 MG/DL — SIGNIFICANT CHANGE UP (ref 70–99)
GLUCOSE BLDC GLUCOMTR-MCNC: 92 MG/DL — SIGNIFICANT CHANGE UP (ref 70–99)
GLUCOSE BLDC GLUCOMTR-MCNC: 98 MG/DL — SIGNIFICANT CHANGE UP (ref 70–99)
SPECIMEN SOURCE: SIGNIFICANT CHANGE UP

## 2024-05-10 RX ORDER — CEFUROXIME AXETIL 250 MG
500 TABLET ORAL EVERY 12 HOURS
Refills: 0 | Status: DISCONTINUED | OUTPATIENT
Start: 2024-05-10 | End: 2024-05-14

## 2024-05-10 RX ORDER — LOSARTAN POTASSIUM 100 MG/1
50 TABLET, FILM COATED ORAL DAILY
Refills: 0 | Status: DISCONTINUED | OUTPATIENT
Start: 2024-05-10 | End: 2024-05-15

## 2024-05-10 RX ORDER — AZITHROMYCIN 500 MG/1
500 TABLET, FILM COATED ORAL DAILY
Refills: 0 | Status: DISCONTINUED | OUTPATIENT
Start: 2024-05-10 | End: 2024-05-11

## 2024-05-10 RX ADMIN — Medication 3 MILLILITER(S): at 14:47

## 2024-05-10 RX ADMIN — Medication 3 MILLILITER(S): at 11:11

## 2024-05-10 RX ADMIN — Medication 500 MILLIGRAM(S): at 11:12

## 2024-05-10 RX ADMIN — ENOXAPARIN SODIUM 40 MILLIGRAM(S): 100 INJECTION SUBCUTANEOUS at 11:11

## 2024-05-10 RX ADMIN — Medication 1 TABLET(S): at 14:47

## 2024-05-10 NOTE — SWALLOW BEDSIDE ASSESSMENT ADULT - PHARYNGEAL PHASE
+ increased wheeze; pt declined further trials/Delayed pharyngeal swallow/Decreased laryngeal elevation

## 2024-05-10 NOTE — SWALLOW BEDSIDE ASSESSMENT ADULT - SLP GENERAL OBSERVATIONS
Pt encountered in bed, 4LNC (off) - Pt continuously taking off, + wheeze (RN Mildred aware, pt refusing nebulizer tx), awake/alert, Aox3, + HHA at bedside.

## 2024-05-10 NOTE — SWALLOW BEDSIDE ASSESSMENT ADULT - COMMENTS
Pulmonology consulted for Dyspnea.   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   - In the mean time can be treated for the post obstructive pneumonia  - Would treat with ceftriaxone + Azithromycin for community acquired pneumonia.   - Sputum culture  - MRSA nasal swab   - Urine Legionella   Discussed findings in detail with patient and her aid at bedside. We discussed that there is a concern for cancer given her mediastinal lymphadenopathy. We explained our recommendations for a bronchoscopy with lymph node biopsies. Pt declined any procedures at this time. Does not want to pursue further work up. Asked us to not speak to her daughter regarding these findings.     SWALLOW HISTORY: No reports in SCM or in PACS prior to this admission.
Pulmonology consulted for Dyspnea.   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   - In the mean time can be treated for the post obstructive pneumonia  - Would treat with ceftriaxone + Azithromycin for community acquired pneumonia.   - Sputum culture  - MRSA nasal swab   - Urine Legionella   Discussed findings in detail with patient and her aid at bedside. We discussed that there is a concern for cancer given her mediastinal lymphadenopathy. We explained our recommendations for a bronchoscopy with lymph node biopsies. Pt declined any procedures at this time. Does not want to pursue further work up. Asked us to not speak to her daughter regarding these findings.   GI consulted for gastric mass; advanced GI consulted due to concern for GIST on CT imaging.  However, upon my interview, patient does not even "want to hear about a mass in her stomach" and refuses any further workup or treatment of the findings on her CT.  She is aware this could be an underlying malignancy, that if left untreated, could ultimately result in her death  -will sign off at this time    SWALLOW HISTORY: No reports in SCM or in PACS prior to this admission.  Attempted bedside swallow evaluation 5/8 and 5/9; deferred 5/8 d/t pending CT A/P and 5/9 d/t pt on bedpan and w/ increased wheeze.

## 2024-05-10 NOTE — SWALLOW BEDSIDE ASSESSMENT ADULT - SWALLOW EVAL: DIAGNOSIS
hand pain/injury Pt is an 87 y/o female a/w hypoxia, found to RLL PNA and Mediastinal Lymphdenopathy. Pt p/w oropharyngeal dysphagia w/ swallow profile remarkable for delayed oral transit time, mild latency in pharyngeal trigger, and reduced hyolaryngeal elevation upon digital palpation. Wheeze worsening w/ PO intake. No immediate overt s/s aspiration observed on limited trials d/t limited participation. Given reports of coughing w/ PO intake as per pt and c/f RLL PNA, would suggest instrumental assessment to r/o aspiration. However, pt declining dysphagia workup despite education in regards to aspiration risk. Defer diet to team given limited PO intake at bedside and will sign off at this time given pt declining dysphagia workup.

## 2024-05-10 NOTE — PROGRESS NOTE ADULT - SUBJECTIVE AND OBJECTIVE BOX
---___---___---___---___---___---___ ---___---___---___---___---___---___---___---___---                  M E D I C A L   A T T E N D I N G   P R O G R E S S   N O T E  ---___---___---___---___---___---___ ---___---___---___---___---___---___---___---___---        ================================================    ++CHIEF COMPLAINT:   Patient is a 88y old  Female who presents with a chief complaint of sob (10 May 2024 11:21)      Pneumonia due to infectious organism      ---___---___---___---___---___---  PAST MEDICAL / Surgical  HISTORY:  PAST MEDICAL & SURGICAL HISTORY:  No pertinent past medical history      No significant past surgical history          ---___---___---___---___---___---  FAMILY HISTORY:   FAMILY HISTORY:        ---___---___---___---___---___---  ALLERGIES:   Allergies    No Known Allergies    Intolerances        ---___---___---___---___---___---  MEDICATIONS:  MEDICATIONS  (STANDING):  albuterol/ipratropium for Nebulization 3 milliLiter(s) Nebulizer every 6 hours  ascorbic acid 500 milliGRAM(s) Oral daily  azithromycin  IVPB 500 milliGRAM(s) IV Intermittent every 24 hours  cefTRIAXone   IVPB 1000 milliGRAM(s) IV Intermittent every 24 hours  dextrose 10% Bolus 125 milliLiter(s) IV Bolus once  dextrose 5%. 1000 milliLiter(s) (100 mL/Hr) IV Continuous <Continuous>  dextrose 5%. 1000 milliLiter(s) (50 mL/Hr) IV Continuous <Continuous>  dextrose 50% Injectable 25 Gram(s) IV Push once  dextrose 50% Injectable 12.5 Gram(s) IV Push once  enoxaparin Injectable 40 milliGRAM(s) SubCutaneous every 24 hours  glucagon  Injectable 1 milliGRAM(s) IntraMuscular once  influenza  Vaccine (HIGH DOSE) 0.7 milliLiter(s) IntraMuscular once  insulin lispro (ADMELOG) corrective regimen sliding scale   SubCutaneous three times a day before meals  losartan 50 milliGRAM(s) Oral daily  methylPREDNISolone sodium succinate Injectable 20 milliGRAM(s) IV Push two times a day  multivitamin 1 Tablet(s) Oral daily    MEDICATIONS  (PRN):  dextrose Oral Gel 15 Gram(s) Oral once PRN Blood Glucose LESS THAN 70 milliGRAM(s)/deciliter      ---___---___---___---___---___---  REVIEW OF SYSTEM:    GEN: no fever, no chills, no pain  RESP: no SOB, no cough, no sputum  CVS: no chest pain, no palpitations, no edema  GI: no abdominal pain, no nausea, no vomiting, no constipation, no diarrhea  : no dysurea, no frequency, no hematurea  Neuro: no headache, no dizziness  PSYCH: no anxiety, no depression  Derm : no itching, no rash    ---___---___---___---___---___---  VITAL SIGNS:  88y , CAPILLARY BLOOD GLUCOSE      POCT Blood Glucose.: 98 mg/dL (10 May 2024 12:10)    T(C): 36.7 (05-10-24 @ 08:30), Max: 36.7 (05-09-24 @ 21:14)  HR: 90 (05-10-24 @ 08:30) (68 - 95)  BP: 148/85 (05-10-24 @ 08:30) (120/64 - 170/83)  RR: 18 (05-10-24 @ 08:30) (18 - 18)  SpO2: 95% (05-10-24 @ 08:30) (91% - 98%)  ---___---___---___---___---___---  PHYSICAL EXAM:    GEN: A&O X 3 , NAD , comfortable  HEENT: NCAT, PERRL, MMM, hearing intact  Neck: supple , no JVD  CVS: S1S2 , regular , No M/R/G appreciated  PULM: CTA B/L,  no W/R/R appreciated  ABD.: soft. non tender, non distended,  bowel sounds present  Extrem: intact pulses , no edema   Derm: No rash , no ecchymoses  PSYCH : normal mood,  no delusion not anxious     ---___---___---___---___---___---            LAB AND IMAGING:                                                  [All pertinent / recent Imaging reviewed]         ---___---___---___---___---___---___ ---___---___---___---___---                         A S S E S S M E N T   A N D   P L A N :      HEALTH ISSUES - PROBLEM Dx:  pneumonia   wheezing   htn stable     continue current care on abx and doing well      -GI/DVT Prophylaxis.as ordered       ___________________________  Thank you,  Kole Prieto  [phone]

## 2024-05-10 NOTE — PROGRESS NOTE ADULT - SUBJECTIVE AND OBJECTIVE BOX
Date of Service: 05-10-24 @ 11:21           CARDIOLOGY     PROGRESS  NOTE   ________________________________________________    CHIEF COMPLAINT:Patient is a 88y old  Female who presents with a chief complaint of sob (09 May 2024 21:05)  no complain  	  REVIEW OF SYSTEMS:  CONSTITUTIONAL: No fever, weight loss, or fatigue  EYES: No eye pain, visual disturbances, or discharge  ENT:  No difficulty hearing, tinnitus, vertigo; No sinus or throat pain  NECK: No pain or stiffness  RESPIRATORY: No cough, wheezing, chills or hemoptysis; No Shortness of Breath  CARDIOVASCULAR: No chest pain, palpitations, passing out, dizziness, or leg swelling  GASTROINTESTINAL: No abdominal or epigastric pain. No nausea, vomiting, or hematemesis; No diarrhea or constipation. No melena or hematochezia.  GENITOURINARY: No dysuria, frequency, hematuria, or incontinence  NEUROLOGICAL: No headaches, memory loss, loss of strength, numbness, or tremors  SKIN: No itching, burning, rashes, or lesions   LYMPH Nodes: No enlarged glands  ENDOCRINE: No heat or cold intolerance; No hair loss  MUSCULOSKELETAL: No joint pain or swelling; No muscle, back, or extremity pain  PSYCHIATRIC: No depression, anxiety, mood swings, or difficulty sleeping  HEME/LYMPH: No easy bruising, or bleeding gums  ALLERGY AND IMMUNOLOGIC: No hives or eczema	    [ x] All others negative	  [ ] Unable to obtain    PHYSICAL EXAM:  T(C): 36.7 (05-10-24 @ 08:30), Max: 36.7 (05-09-24 @ 21:14)  HR: 90 (05-10-24 @ 08:30) (68 - 95)  BP: 148/85 (05-10-24 @ 08:30) (120/64 - 170/83)  RR: 18 (05-10-24 @ 08:30) (18 - 18)  SpO2: 95% (05-10-24 @ 08:30) (91% - 98%)  Wt(kg): --  I&O's Summary      Appearance: Normal	  HEENT:   Normal oral mucosa, PERRL, EOMI	  Lymphatic: No lymphadenopathy  Cardiovascular: Normal S1 S2, No JVD, + murmurs, No edema  Respiratory: rhonchi  Psychiatry: A & O x 3, Mood & affect appropriate  Gastrointestinal:  Soft, Non-tender, + BS	  Skin: No rashes, No ecchymoses, No cyanosis	  Extremities: Normal range of motion, No clubbing, cyanosis or edema  Vascular: Peripheral pulses palpable 2+ bilaterally    MEDICATIONS  (STANDING):  albuterol/ipratropium for Nebulization 3 milliLiter(s) Nebulizer every 6 hours  ascorbic acid 500 milliGRAM(s) Oral daily  azithromycin  IVPB 500 milliGRAM(s) IV Intermittent every 24 hours  cefTRIAXone   IVPB 1000 milliGRAM(s) IV Intermittent every 24 hours  dextrose 10% Bolus 125 milliLiter(s) IV Bolus once  dextrose 5%. 1000 milliLiter(s) (100 mL/Hr) IV Continuous <Continuous>  dextrose 5%. 1000 milliLiter(s) (50 mL/Hr) IV Continuous <Continuous>  dextrose 50% Injectable 25 Gram(s) IV Push once  dextrose 50% Injectable 12.5 Gram(s) IV Push once  enoxaparin Injectable 40 milliGRAM(s) SubCutaneous every 24 hours  glucagon  Injectable 1 milliGRAM(s) IntraMuscular once  influenza  Vaccine (HIGH DOSE) 0.7 milliLiter(s) IntraMuscular once  insulin lispro (ADMELOG) corrective regimen sliding scale   SubCutaneous three times a day before meals  methylPREDNISolone sodium succinate Injectable 20 milliGRAM(s) IV Push three times a day  multivitamin 1 Tablet(s) Oral daily      TELEMETRY: 	    ECG:  	  RADIOLOGY:  OTHER: 	  	  LABS:	 	    CARDIAC MARKERS:      proBNP:   Lipid Profile:   HgA1c:   TSH:         Assessment and plan  ---------------------------  88-year-old female without any prevalent significant past medical condition chief complaint of shortness of breath ongoing the past couple of days or so.  Patient noted to be hypoxic by EMS to 88%.  Patient does not wear any home oxygen at home.  Patient present with aide who also has paperwork showing she is DNR DNI/with the [employer] form.  pt with hypoxia ?etiology  r/o PE/  +pneumoniae  awaiting CTA of the chest  dvt prophylaxis  continue iv abx , CTA noted  TTE noted  pulmonary eval noted, appreciated  oob to chair with assistant as tolerated  losartan 25 mg daily  decrease steroid dose  pulmonary follow up  dc planning to rehab  increase losartan to 50 mg daily

## 2024-05-10 NOTE — SWALLOW BEDSIDE ASSESSMENT ADULT - SLP PERTINENT HISTORY OF CURRENT PROBLEM
Pt is a 88-year-old female h/o  right shoulder dislocation. rib  fx's without any prevalent significant past medical condition, c/o ongoing the past couple of days or so./  noted to be hypoxic by EMS to 88%. Arrives  with aide who a has paperwork, pt is DNR DNI/with the [facility] form. P/w sob/wheezing ?PNA, awaiting ct angio, to r/o PE. Pt febrile and tachycardic in ER. CXR normal. Bcx. Ucx/ on iv Rocephin/IV steriod, proventil. Prior CT chest imaging with ? gist. Dementia. CT A/P if family wishes to pursue. DVT ppx.
Pt is a 88-year-old female h/o  right shoulder dislocation. rib  fx's without any prevalent significant past medical condition, c/o ongoing the past couple of days or so./  noted to be hypoxic by EMS to 88%. Arrives  with aide who a has paperwork, pt is DNR DNI/with the [facility] form. P/w sob/wheezing ?PNA, awaiting ct angio, to r/o PE. Pt febrile and tachycardic in ER. CXR normal. Bcx. Ucx/ on iv Rocephin/IV steriod, proventil. Prior CT chest imaging with ? gist. Dementia. CT A/P if family wishes to pursue. DVT ppx.

## 2024-05-10 NOTE — SWALLOW BEDSIDE ASSESSMENT ADULT - SWALLOW EVAL: SECRETION MANAGEMENT
Left message at [facility] rheumatologist (Dr. Valencia [phone]) in regards to recommendations for methotrexate (RHEUMATREX) 2.5 MG tablet as patient was dx today with a flare up of shingles.     Kalli at Dr. Valencia's office will pass the message to the nursing staff.    unremarkable

## 2024-05-11 LAB
GLUCOSE BLDC GLUCOMTR-MCNC: 125 MG/DL — HIGH (ref 70–99)
GLUCOSE BLDC GLUCOMTR-MCNC: 84 MG/DL — SIGNIFICANT CHANGE UP (ref 70–99)
GLUCOSE BLDC GLUCOMTR-MCNC: 85 MG/DL — SIGNIFICANT CHANGE UP (ref 70–99)
GLUCOSE BLDC GLUCOMTR-MCNC: 97 MG/DL — SIGNIFICANT CHANGE UP (ref 70–99)

## 2024-05-11 RX ADMIN — Medication 40 MILLIGRAM(S): at 08:54

## 2024-05-11 RX ADMIN — Medication 500 MILLIGRAM(S): at 11:17

## 2024-05-11 RX ADMIN — Medication 1 TABLET(S): at 11:17

## 2024-05-11 RX ADMIN — Medication 500 MILLIGRAM(S): at 08:53

## 2024-05-11 RX ADMIN — Medication 500 MILLIGRAM(S): at 21:33

## 2024-05-11 RX ADMIN — ENOXAPARIN SODIUM 40 MILLIGRAM(S): 100 INJECTION SUBCUTANEOUS at 08:55

## 2024-05-11 RX ADMIN — LOSARTAN POTASSIUM 50 MILLIGRAM(S): 100 TABLET, FILM COATED ORAL at 08:54

## 2024-05-11 RX ADMIN — Medication 3 MILLILITER(S): at 16:50

## 2024-05-11 RX ADMIN — Medication 3 MILLILITER(S): at 11:29

## 2024-05-11 RX ADMIN — AZITHROMYCIN 500 MILLIGRAM(S): 500 TABLET, FILM COATED ORAL at 11:17

## 2024-05-11 NOTE — PROGRESS NOTE ADULT - SUBJECTIVE AND OBJECTIVE BOX
Date of Service: 05-11-24 @ 11:15           CARDIOLOGY     PROGRESS  NOTE   ________________________________________________    CHIEF COMPLAINT:Patient is a 88y old  Female who presents with a chief complaint of sob (10 May 2024 12:46)  refusing iv line  	  REVIEW OF SYSTEMS:  CONSTITUTIONAL: No fever, weight loss, or fatigue  EYES: No eye pain, visual disturbances, or discharge  ENT:  No difficulty hearing, tinnitus, vertigo; No sinus or throat pain  NECK: No pain or stiffness  RESPIRATORY: No cough, wheezing, chills or hemoptysis; No Shortness of Breath  CARDIOVASCULAR: No chest pain, palpitations, passing out, dizziness, or leg swelling  GASTROINTESTINAL: No abdominal or epigastric pain. No nausea, vomiting, or hematemesis; No diarrhea or constipation. No melena or hematochezia.  GENITOURINARY: No dysuria, frequency, hematuria, or incontinence  NEUROLOGICAL: No headaches, memory loss, loss of strength, numbness, or tremors  SKIN: No itching, burning, rashes, or lesions   LYMPH Nodes: No enlarged glands  ENDOCRINE: No heat or cold intolerance; No hair loss  MUSCULOSKELETAL: No joint pain or swelling; No muscle, back, or extremity pain  PSYCHIATRIC: No depression, anxiety, mood swings, or difficulty sleeping  HEME/LYMPH: No easy bruising, or bleeding gums  ALLERGY AND IMMUNOLOGIC: No hives or eczema	    [x ] All others negative	  [ ] Unable to obtain    PHYSICAL EXAM:  T(C): 36.5 (05-11-24 @ 05:06), Max: 36.8 (05-10-24 @ 16:05)  HR: 89 (05-11-24 @ 05:06) (85 - 93)  BP: 141/76 (05-11-24 @ 05:06) (133/86 - 154/87)  RR: 18 (05-11-24 @ 05:06) (18 - 19)  SpO2: 93% (05-11-24 @ 05:06) (91% - 97%)  Wt(kg): --  I&O's Summary      Appearance: Normal	  HEENT:   Normal oral mucosa, PERRL, EOMI	  Lymphatic: No lymphadenopathy  Cardiovascular: Normal S1 S2, No JVD, + murmurs, No edema  Respiratory: rhonchi  Psychiatry: A & O x 3, Mood & affect appropriate  Gastrointestinal:  Soft, Non-tender, + BS	  Skin: No rashes, No ecchymoses, No cyanosis	  Neurologic: Non-focal  Extremities: Normal range of motion, No clubbing, cyanosis or edema  Vascular: Peripheral pulses palpable 2+ bilaterally    MEDICATIONS  (STANDING):  albuterol/ipratropium for Nebulization 3 milliLiter(s) Nebulizer every 6 hours  ascorbic acid 500 milliGRAM(s) Oral daily  azithromycin   Tablet 500 milliGRAM(s) Oral daily  cefuroxime   Tablet 500 milliGRAM(s) Oral every 12 hours  dextrose 10% Bolus 125 milliLiter(s) IV Bolus once  dextrose 5%. 1000 milliLiter(s) (100 mL/Hr) IV Continuous <Continuous>  dextrose 5%. 1000 milliLiter(s) (50 mL/Hr) IV Continuous <Continuous>  dextrose 50% Injectable 25 Gram(s) IV Push once  dextrose 50% Injectable 12.5 Gram(s) IV Push once  enoxaparin Injectable 40 milliGRAM(s) SubCutaneous every 24 hours  glucagon  Injectable 1 milliGRAM(s) IntraMuscular once  influenza  Vaccine (HIGH DOSE) 0.7 milliLiter(s) IntraMuscular once  insulin lispro (ADMELOG) corrective regimen sliding scale   SubCutaneous three times a day before meals  losartan 50 milliGRAM(s) Oral daily  multivitamin 1 Tablet(s) Oral daily  predniSONE   Tablet 40 milliGRAM(s) Oral daily      TELEMETRY: 	    ECG:  	  RADIOLOGY:  OTHER: 	  	  LABS:	 	    CARDIAC MARKERS:    proBNP:   Lipid Profile:   HgA1c:   TSH:         Assessment and plan  --------------------------  88-year-old female without any prevalent significant past medical condition chief complaint of shortness of breath ongoing the past couple of days or so.  Patient noted to be hypoxic by EMS to 88%.  Patient does not wear any home oxygen at home.  Patient present with aide who also has paperwork showing she is DNR DNI/with the [employer] form.  pt with hypoxia ?etiology  r/o PE/  +pneumoniae  awaiting CTA of the chest  dvt prophylaxis  continue iv abx , CTA noted  TTE noted  pulmonary eval noted, appreciated  oob to chair with assistant as tolerated  losartan 25 mg daily  decrease steroid dose  pulmonary follow up  dc planning to rehab  increase losartan to 50 mg daily  refusin iv access, meds switchesd to po  daughter and pt are deciding on rehab or [facility]

## 2024-05-11 NOTE — PROGRESS NOTE ADULT - SUBJECTIVE AND OBJECTIVE BOX
date of service: 05-11-24 @ 12:54  afberile  REVIEW OF SYSTEMS:  CONSTITUTIONAL: No fever,  no  weight loss  ENT:  No  tinnitus,   no   vertigo  NECK: No pain or stiffness  RESPIRATORY: No cough, wheezing, chills or hemoptysis;    No Shortness of Breath  CARDIOVASCULAR: No chest pain, palpitations, dizziness  GASTROINTESTINAL: No abdominal or epigastric pain. No nausea, vomiting, or hematemesis; No diarrhea  No melena or hematochezia.  GENITOURINARY: No dysuria, frequency, hematuria, or incontinence  NEUROLOGICAL: No headaches  SKIN: No itching,  no   rash  LYMPH Nodes: No enlarged glands  ENDOCRINE: No heat or cold intolerance  MUSCULOSKELETAL: No joint pain or swelling  PSYCHIATRIC: No depression, anxiety  HEME/LYMPH: No easy bruising, or bleeding gums  ALLERGY AND IMMUNOLOGIC: No hives or eczema	    MEDICATIONS  (STANDING):  albuterol/ipratropium for Nebulization 3 milliLiter(s) Nebulizer every 6 hours  ascorbic acid 500 milliGRAM(s) Oral daily  azithromycin   Tablet 500 milliGRAM(s) Oral daily  cefuroxime   Tablet 500 milliGRAM(s) Oral every 12 hours  dextrose 10% Bolus 125 milliLiter(s) IV Bolus once  dextrose 5%. 1000 milliLiter(s) (100 mL/Hr) IV Continuous <Continuous>  dextrose 5%. 1000 milliLiter(s) (50 mL/Hr) IV Continuous <Continuous>  dextrose 50% Injectable 25 Gram(s) IV Push once  dextrose 50% Injectable 12.5 Gram(s) IV Push once  enoxaparin Injectable 40 milliGRAM(s) SubCutaneous every 24 hours  glucagon  Injectable 1 milliGRAM(s) IntraMuscular once  influenza  Vaccine (HIGH DOSE) 0.7 milliLiter(s) IntraMuscular once  insulin lispro (ADMELOG) corrective regimen sliding scale   SubCutaneous three times a day before meals  losartan 50 milliGRAM(s) Oral daily  multivitamin 1 Tablet(s) Oral daily  predniSONE   Tablet 40 milliGRAM(s) Oral daily    MEDICATIONS  (PRN):  dextrose Oral Gel 15 Gram(s) Oral once PRN Blood Glucose LESS THAN 70 milliGRAM(s)/deciliter      Vital Signs Last 24 Hrs  T(C): 36.5 (11 May 2024 05:06), Max: 36.8 (10 May 2024 16:05)  T(F): 97.7 (11 May 2024 05:06), Max: 98.3 (10 May 2024 16:05)  HR: 89 (11 May 2024 05:06) (89 - 93)  BP: 141/76 (11 May 2024 05:06) (133/86 - 143/65)  BP(mean): --  RR: 18 (11 May 2024 05:06) (18 - 19)  SpO2: 93% (11 May 2024 05:06) (93% - 97%)    Parameters below as of 11 May 2024 05:06  Patient On (Oxygen Delivery Method): nasal cannula  O2 Flow (L/min): 4    CAPILLARY BLOOD GLUCOSE      POCT Blood Glucose.: 84 mg/dL (11 May 2024 12:12)  POCT Blood Glucose.: 85 mg/dL (11 May 2024 08:16)  POCT Blood Glucose.: 92 mg/dL (10 May 2024 21:52)  POCT Blood Glucose.: 104 mg/dL (10 May 2024 17:13)    I&O's Summary        Appearance: Normal	  HEENT:   Normal oral mucosa, PERRL, EOMI	  Lymphatic: No lymphadenopathy  Cardiovascular: Normal S1 S2, No JVD  Respiratory: Lungs clear to auscultation	  Gastrointestinal:  Soft, Non-tender, + BS	  Skin: No rash, No ecchymoses	  Extremities:     LABS:                                  Consultant(s) Notes Reviewed:      Care Discussed with Consultants/Other Providers:

## 2024-05-11 NOTE — PROGRESS NOTE ADULT - ASSESSMENT
88-year-old female      h/o  right  shoulder  dislocation.  rib  fx's      without any prevalent significant past medical condition      c/o  ongoing the past couple of days or so./  noted to be hypoxic by EMS to 88%.        pt  is DNR DNI/  [facility]   form.      sob/    wheezing/  CAP , on arrival       pt   was febrile  and  tachycardic in  er.  cxr, normal        bcx.  ucx          on iv Rocephin /  iv steroid, proventil    prior   ct  chest  imaging   with  ?  gist    dementia     dvt ppx      CT   chest,  no  pe/  pna.  R олег  hilar  l  nodes,  with  mass  effect on bronchus/  pulm  eval     pn  ceftin/  prednisone    pt   is  dnr/  dni           88-year-old female      h/o  right  shoulder  dislocation.  rib  fx's      without any prevalent significant past medical condition      c/o  ongoing the past couple of days or so./  noted to be hypoxic by EMS to 88%.        pt  is DNR DNI/  [facility]   form.      sob/    wheezing/  CAP , on arrival       pt   was febrile  and  tachycardic in  er.  cxr, normal        bcx.  ucx          on iv Rocephin /  iv steroid, proventil    prior   ct  chest  imaging   with  ?  gist    dementia     dvt ppx      CT   chest,  no  pe/  pna.  R олег  hilar  l  nodes,  with  mass  effect on bronchus/  pulm  eval    ct a/p  GIST, lesser  curvature/ pt  not ideal cnadidate  for  intervention     pn  ceftin/  prednisone    pt   is  dnr/  dni

## 2024-05-12 LAB
CULTURE RESULTS: SIGNIFICANT CHANGE UP
CULTURE RESULTS: SIGNIFICANT CHANGE UP
GLUCOSE BLDC GLUCOMTR-MCNC: 107 MG/DL — HIGH (ref 70–99)
GLUCOSE BLDC GLUCOMTR-MCNC: 162 MG/DL — HIGH (ref 70–99)
GLUCOSE BLDC GLUCOMTR-MCNC: 94 MG/DL — SIGNIFICANT CHANGE UP (ref 70–99)
GLUCOSE BLDC GLUCOMTR-MCNC: 95 MG/DL — SIGNIFICANT CHANGE UP (ref 70–99)
HCT VFR BLD CALC: 40.3 % — SIGNIFICANT CHANGE UP (ref 34.5–45)
HGB BLD-MCNC: 12.6 G/DL — SIGNIFICANT CHANGE UP (ref 11.5–15.5)
MCHC RBC-ENTMCNC: 29 PG — SIGNIFICANT CHANGE UP (ref 27–34)
MCHC RBC-ENTMCNC: 31.3 GM/DL — LOW (ref 32–36)
MCV RBC AUTO: 92.6 FL — SIGNIFICANT CHANGE UP (ref 80–100)
NRBC # BLD: 0 /100 WBCS — SIGNIFICANT CHANGE UP (ref 0–0)
PLATELET # BLD AUTO: 287 K/UL — SIGNIFICANT CHANGE UP (ref 150–400)
RBC # BLD: 4.35 M/UL — SIGNIFICANT CHANGE UP (ref 3.8–5.2)
RBC # FLD: 14.1 % — SIGNIFICANT CHANGE UP (ref 10.3–14.5)
SPECIMEN SOURCE: SIGNIFICANT CHANGE UP
SPECIMEN SOURCE: SIGNIFICANT CHANGE UP
WBC # BLD: 9.98 K/UL — SIGNIFICANT CHANGE UP (ref 3.8–10.5)
WBC # FLD AUTO: 9.98 K/UL — SIGNIFICANT CHANGE UP (ref 3.8–10.5)

## 2024-05-12 RX ADMIN — Medication 500 MILLIGRAM(S): at 17:30

## 2024-05-12 RX ADMIN — Medication 500 MILLIGRAM(S): at 11:51

## 2024-05-12 RX ADMIN — Medication 1 TABLET(S): at 11:51

## 2024-05-12 RX ADMIN — Medication 500 MILLIGRAM(S): at 08:02

## 2024-05-12 RX ADMIN — LOSARTAN POTASSIUM 50 MILLIGRAM(S): 100 TABLET, FILM COATED ORAL at 08:02

## 2024-05-12 RX ADMIN — Medication 3 MILLILITER(S): at 23:15

## 2024-05-12 RX ADMIN — Medication 40 MILLIGRAM(S): at 08:02

## 2024-05-12 RX ADMIN — Medication 3 MILLILITER(S): at 11:50

## 2024-05-12 RX ADMIN — Medication 3 MILLILITER(S): at 17:30

## 2024-05-12 RX ADMIN — Medication 3 MILLILITER(S): at 08:02

## 2024-05-12 NOTE — PROGRESS NOTE ADULT - SUBJECTIVE AND OBJECTIVE BOX
Date of Service: 05-12-24 @ 13:27           CARDIOLOGY     PROGRESS  NOTE   ________________________________________________    CHIEF COMPLAINT:Patient is a 88y old  Female who presents with a chief complaint of sob (12 May 2024 10:58)  no complain  	  REVIEW OF SYSTEMS:  CONSTITUTIONAL: No fever, weight loss, or fatigue  EYES: No eye pain, visual disturbances, or discharge  ENT:  No difficulty hearing, tinnitus, vertigo; No sinus or throat pain  NECK: No pain or stiffness  RESPIRATORY: No cough, wheezing, chills or hemoptysis; No Shortness of Breath  CARDIOVASCULAR: No chest pain, palpitations, passing out, dizziness, or leg swelling  GASTROINTESTINAL: No abdominal or epigastric pain. No nausea, vomiting, or hematemesis; No diarrhea or constipation. No melena or hematochezia.  GENITOURINARY: No dysuria, frequency, hematuria, or incontinence  NEUROLOGICAL: No headaches, memory loss, loss of strength, numbness, or tremors  SKIN: No itching, burning, rashes, or lesions   LYMPH Nodes: No enlarged glands  ENDOCRINE: No heat or cold intolerance; No hair loss  MUSCULOSKELETAL: No joint pain or swelling; No muscle, back, or extremity pain  PSYCHIATRIC: No depression, anxiety, mood swings, or difficulty sleeping  HEME/LYMPH: No easy bruising, or bleeding gums  ALLERGY AND IMMUNOLOGIC: No hives or eczema	    [x ] All others negative	  [ ] Unable to obtain    PHYSICAL EXAM:  T(C): 36.7 (05-12-24 @ 09:53), Max: 36.8 (05-11-24 @ 20:30)  HR: 81 (05-12-24 @ 09:53) (67 - 95)  BP: 133/80 (05-12-24 @ 09:53) (131/71 - 161/72)  RR: 18 (05-12-24 @ 09:53) (18 - 18)  SpO2: 92% (05-12-24 @ 09:53) (90% - 98%)  Wt(kg): --  I&O's Summary    11 May 2024 07:01  -  12 May 2024 07:00  --------------------------------------------------------  IN: 0 mL / OUT: 500 mL / NET: -500 mL        Appearance: Normal	  HEENT:   Normal oral mucosa, PERRL, EOMI	  Lymphatic: No lymphadenopathy  Cardiovascular: Normal S1 S2, No JVD, + murmurs, No edema  Respiratory: rhonchi  Psychiatry: A & O x 3, Mood & affect appropriate  Gastrointestinal:  Soft, Non-tender, + BS	  Skin: No rashes, No ecchymoses, No cyanosis	  Extremities: Normal range of motion, No clubbing, cyanosis or edema  Vascular: Peripheral pulses palpable 2+ bilaterally    MEDICATIONS  (STANDING):  albuterol/ipratropium for Nebulization 3 milliLiter(s) Nebulizer every 6 hours  ascorbic acid 500 milliGRAM(s) Oral daily  cefuroxime   Tablet 500 milliGRAM(s) Oral every 12 hours  dextrose 10% Bolus 125 milliLiter(s) IV Bolus once  dextrose 5%. 1000 milliLiter(s) (100 mL/Hr) IV Continuous <Continuous>  dextrose 5%. 1000 milliLiter(s) (50 mL/Hr) IV Continuous <Continuous>  dextrose 50% Injectable 25 Gram(s) IV Push once  dextrose 50% Injectable 12.5 Gram(s) IV Push once  enoxaparin Injectable 40 milliGRAM(s) SubCutaneous every 24 hours  glucagon  Injectable 1 milliGRAM(s) IntraMuscular once  influenza  Vaccine (HIGH DOSE) 0.7 milliLiter(s) IntraMuscular once  insulin lispro (ADMELOG) corrective regimen sliding scale   SubCutaneous three times a day before meals  losartan 50 milliGRAM(s) Oral daily  multivitamin 1 Tablet(s) Oral daily  predniSONE   Tablet 40 milliGRAM(s) Oral daily      TELEMETRY: 	    ECG:  	  RADIOLOGY:  OTHER: 	  	  LABS:	 	    CARDIAC MARKERS:                                12.6   9.98  )-----------( 287      ( 12 May 2024 06:59 )             40.3           proBNP:   Lipid Profile:   HgA1c:   TSH:         Assessment and plan  ---------------------------  88-year-old female without any prevalent significant past medical condition chief complaint of shortness of breath ongoing the past couple of days or so.  Patient noted to be hypoxic by EMS to 88%.  Patient does not wear any home oxygen at home.  Patient present with aide who also has paperwork showing she is DNR DNI/with the [employer] form.  pt with hypoxia ?etiology  r/o PE/  +pneumoniae  awaiting CTA of the chest  dvt prophylaxis  continue iv abx , CTA noted  TTE noted  pulmonary eval noted, appreciated  oob to chair with assistant as tolerated  losartan 25 mg daily  decrease steroid dose  pulmonary follow up  dc planning to rehab  increase losartan to 50 mg daily  refusing iv access, meds switched to po  daughter and pt are deciding on rehab or [facility]  had  a long discussion with the daughter . she wants to be dc on Tuesday so she can arrange home attendant  will discuss with care coordination  taper steroid

## 2024-05-12 NOTE — PROGRESS NOTE ADULT - SUBJECTIVE AND OBJECTIVE BOX
---___---___---___---___---___---___ ---___---___---___---___---___---___---___---___---                  M E D I C A L   A T T E N D I N G   P R O G R E S S   N O T E  ---___---___---___---___---___---___ ---___---___---___---___---___---___---___---___---        ================================================    ++CHIEF COMPLAINT:   Patient is a 88y old  Female who presents with a chief complaint of sob (11 May 2024 12:54)      Pneumonia due to infectious organism      ---___---___---___---___---___---  PAST MEDICAL / Surgical  HISTORY:  PAST MEDICAL & SURGICAL HISTORY:  No pertinent past medical history      No significant past surgical history          ---___---___---___---___---___---  FAMILY HISTORY:   FAMILY HISTORY:        ---___---___---___---___---___---  ALLERGIES:   Allergies    No Known Allergies    Intolerances        ---___---___---___---___---___---  MEDICATIONS:  MEDICATIONS  (STANDING):  albuterol/ipratropium for Nebulization 3 milliLiter(s) Nebulizer every 6 hours  ascorbic acid 500 milliGRAM(s) Oral daily  cefuroxime   Tablet 500 milliGRAM(s) Oral every 12 hours  dextrose 10% Bolus 125 milliLiter(s) IV Bolus once  dextrose 5%. 1000 milliLiter(s) (100 mL/Hr) IV Continuous <Continuous>  dextrose 5%. 1000 milliLiter(s) (50 mL/Hr) IV Continuous <Continuous>  dextrose 50% Injectable 25 Gram(s) IV Push once  dextrose 50% Injectable 12.5 Gram(s) IV Push once  enoxaparin Injectable 40 milliGRAM(s) SubCutaneous every 24 hours  glucagon  Injectable 1 milliGRAM(s) IntraMuscular once  influenza  Vaccine (HIGH DOSE) 0.7 milliLiter(s) IntraMuscular once  insulin lispro (ADMELOG) corrective regimen sliding scale   SubCutaneous three times a day before meals  losartan 50 milliGRAM(s) Oral daily  multivitamin 1 Tablet(s) Oral daily  predniSONE   Tablet 40 milliGRAM(s) Oral daily    MEDICATIONS  (PRN):  dextrose Oral Gel 15 Gram(s) Oral once PRN Blood Glucose LESS THAN 70 milliGRAM(s)/deciliter      ---___---___---___---___---___---  REVIEW OF SYSTEM:    GEN: no fever, no chills, no pain  RESP: no SOB, no cough, no sputum  CVS: no chest pain, no palpitations, no edema  GI: no abdominal pain, no nausea, no vomiting, no constipation, no diarrhea  : no dysurea, no frequency, no hematurea  Neuro: no headache, no dizziness  PSYCH: no anxiety, no depression  Derm : no itching, no rash    ---___---___---___---___---___---  VITAL SIGNS:  88y , CAPILLARY BLOOD GLUCOSE      POCT Blood Glucose.: 94 mg/dL (12 May 2024 08:23)    T(C): 36.7 (24 @ 09:53), Max: 36.8 (24 @ 20:30)  HR: 81 (24 @ 09:53) (67 - 95)  BP: 133/80 (24 @ 09:53) (131/71 - 161/72)  RR: 18 (24 @ 09:53) (18 - 18)  SpO2: 92% (24 @ 09:53) (90% - 98%)  ---___---___---___---___---___---  PHYSICAL EXAM:    GEN: A&O X 3 , NAD , comfortable  HEENT: NCAT, PERRL, MMM, hearing intact  Neck: supple , no JVD  CVS: S1S2 , regular , No M/R/G appreciated  PULM: CTA B/L,  no W/R/R appreciated  ABD.: soft. non tender, non distended,  bowel sounds present  Extrem: intact pulses , no edema   Derm: No rash , no ecchymoses  PSYCH : normal mood,  no delusion not anxious     ---___---___---___---___---___---            LAB AND IMAGIN.6   9.98  )-----------( 287      ( 12 May 2024 06:59 )             40.3                                                 [All pertinent / recent Imaging reviewed]         ---___---___---___---___---___---___ ---___---___---___---___---                         A S S E S S M E N T   A N D   P L A N :      HEALTH ISSUES - PROBLEM Dx:  pneumonia  continue iv abx and nebulizers   respiratory failure   and prednisone and o2   monitor clinically                 -GI/DVT Prophylaxis. as ordered       ___________________________  Thank you,  Kole Prieto  [phone]

## 2024-05-13 LAB
GLUCOSE BLDC GLUCOMTR-MCNC: 130 MG/DL — HIGH (ref 70–99)
GLUCOSE BLDC GLUCOMTR-MCNC: 181 MG/DL — HIGH (ref 70–99)
GLUCOSE BLDC GLUCOMTR-MCNC: 87 MG/DL — SIGNIFICANT CHANGE UP (ref 70–99)
GLUCOSE BLDC GLUCOMTR-MCNC: 98 MG/DL — SIGNIFICANT CHANGE UP (ref 70–99)

## 2024-05-13 PROCEDURE — 71045 X-RAY EXAM CHEST 1 VIEW: CPT | Mod: 26

## 2024-05-13 RX ADMIN — Medication 1 TABLET(S): at 12:15

## 2024-05-13 RX ADMIN — Medication 500 MILLIGRAM(S): at 08:50

## 2024-05-13 RX ADMIN — Medication 3 MILLILITER(S): at 12:15

## 2024-05-13 RX ADMIN — Medication 30 MILLIGRAM(S): at 08:50

## 2024-05-13 RX ADMIN — Medication 500 MILLIGRAM(S): at 12:15

## 2024-05-13 RX ADMIN — ENOXAPARIN SODIUM 40 MILLIGRAM(S): 100 INJECTION SUBCUTANEOUS at 05:47

## 2024-05-13 RX ADMIN — Medication 500 MILLIGRAM(S): at 17:57

## 2024-05-13 RX ADMIN — Medication 3 MILLILITER(S): at 05:47

## 2024-05-13 RX ADMIN — Medication 3 MILLILITER(S): at 17:57

## 2024-05-13 RX ADMIN — LOSARTAN POTASSIUM 50 MILLIGRAM(S): 100 TABLET, FILM COATED ORAL at 08:50

## 2024-05-13 NOTE — PROGRESS NOTE ADULT - SUBJECTIVE AND OBJECTIVE BOX
---___---___---___---___---___---___ ---___---___---___---___---___---___---___---___---                  M E D I C A L   A T T E N D I N G   P R O G R E S S   N O T E  ---___---___---___---___---___---___ ---___---___---___---___---___---___---___---___---        ================================================    ++CHIEF COMPLAINT:   Patient is a 88y old  Female who presents with a chief complaint of sob (13 May 2024 07:17)      Pneumonia due to infectious organism      ---___---___---___---___---___---  PAST MEDICAL / Surgical  HISTORY:  PAST MEDICAL & SURGICAL HISTORY:  No pertinent past medical history      No significant past surgical history          ---___---___---___---___---___---  FAMILY HISTORY:   FAMILY HISTORY:        ---___---___---___---___---___---  ALLERGIES:   Allergies    No Known Allergies    Intolerances        ---___---___---___---___---___---  MEDICATIONS:  MEDICATIONS  (STANDING):  albuterol/ipratropium for Nebulization 3 milliLiter(s) Nebulizer every 6 hours  ascorbic acid 500 milliGRAM(s) Oral daily  cefuroxime   Tablet 500 milliGRAM(s) Oral every 12 hours  dextrose 10% Bolus 125 milliLiter(s) IV Bolus once  dextrose 5%. 1000 milliLiter(s) (100 mL/Hr) IV Continuous <Continuous>  dextrose 5%. 1000 milliLiter(s) (50 mL/Hr) IV Continuous <Continuous>  dextrose 50% Injectable 25 Gram(s) IV Push once  dextrose 50% Injectable 12.5 Gram(s) IV Push once  enoxaparin Injectable 40 milliGRAM(s) SubCutaneous every 24 hours  glucagon  Injectable 1 milliGRAM(s) IntraMuscular once  influenza  Vaccine (HIGH DOSE) 0.7 milliLiter(s) IntraMuscular once  insulin lispro (ADMELOG) corrective regimen sliding scale   SubCutaneous three times a day before meals  losartan 50 milliGRAM(s) Oral daily  multivitamin 1 Tablet(s) Oral daily  predniSONE   Tablet 30 milliGRAM(s) Oral daily    MEDICATIONS  (PRN):  dextrose Oral Gel 15 Gram(s) Oral once PRN Blood Glucose LESS THAN 70 milliGRAM(s)/deciliter      ---___---___---___---___---___---  REVIEW OF SYSTEM:    GEN: no fever, no chills, no pain  RESP: no SOB, no cough, no sputum  CVS: no chest pain, no palpitations, no edema  GI: no abdominal pain, no nausea, no vomiting, no constipation, no diarrhea  : no dysurea, no frequency, no hematurea  Neuro: no headache, no dizziness  PSYCH: no anxiety, no depression  Derm : no itching, no rash    ---___---___---___---___---___---  VITAL SIGNS:  88y , CAPILLARY BLOOD GLUCOSE      POCT Blood Glucose.: 98 mg/dL (13 May 2024 16:57)    T(C): 36.7 (24 @ 16:00), Max: 36.9 (24 @ 13:15)  HR: 67 (24 @ 16:00) (67 - 103)  BP: 153/69 (24 @ 16:00) (130/71 - 165/67)  RR: 18 (24 @ 16:00) (18 - 18)  SpO2: 94% (24 @ 16:00) (92% - 95%)  ---___---___---___---___---___---  PHYSICAL EXAM:    GEN: A&O X 3 , NAD , comfortable  HEENT: NCAT, PERRL, MMM, hearing intact  Neck: supple , no JVD  CVS: S1S2 , regular , No M/R/G appreciated  PULM: CTA B/L,  no W/R/R appreciated  ABD.: soft. non tender, non distended,  bowel sounds present  Extrem: intact pulses , no edema   Derm: No rash , no ecchymoses  PSYCH : normal mood,  no delusion not anxious     ---___---___---___---___---___---            LAB AND IMAGIN.6   9.98  )-----------( 287      ( 12 May 2024 06:59 )             40.3                                                 [All pertinent / recent Imaging reviewed]         ---___---___---___---___---___---___ ---___---___---___---___---                         A S S E S S M E N T   A N D   P L A N :      HEALTH ISSUES - PROBLEM Dx:  pneumonia   due to infectious disease   wheezing  continue iv abx   and on o2   htn on meds      start dc planning             -GI/DVT Prophylaxis.  as ordered  --------------------------------------------    ___________________________  Thank you,  Kole Prieto  [phone]

## 2024-05-13 NOTE — PROGRESS NOTE ADULT - SUBJECTIVE AND OBJECTIVE BOX
Date of Service: 05-13-24 @ 07:18           CARDIOLOGY     PROGRESS  NOTE   ________________________________________________    CHIEF COMPLAINT:Patient is a 88y old  Female who presents with a chief complaint of sob (12 May 2024 13:27)  no complain  	  REVIEW OF SYSTEMS:  CONSTITUTIONAL: No fever, weight loss, or fatigue  EYES: No eye pain, visual disturbances, or discharge  ENT:  No difficulty hearing, tinnitus, vertigo; No sinus or throat pain  NECK: No pain or stiffness  RESPIRATORY: No cough, wheezing, chills or hemoptysis; No Shortness of Breath  CARDIOVASCULAR: No chest pain, palpitations, passing out, dizziness, or leg swelling  GASTROINTESTINAL: No abdominal or epigastric pain. No nausea, vomiting, or hematemesis; No diarrhea or constipation. No melena or hematochezia.  GENITOURINARY: No dysuria, frequency, hematuria, or incontinence  NEUROLOGICAL: No headaches, memory loss, loss of strength, numbness, or tremors  SKIN: No itching, burning, rashes, or lesions   LYMPH Nodes: No enlarged glands  ENDOCRINE: No heat or cold intolerance; No hair loss  MUSCULOSKELETAL: No joint pain or swelling; No muscle, back, or extremity pain  PSYCHIATRIC: No depression, anxiety, mood swings, or difficulty sleeping  HEME/LYMPH: No easy bruising, or bleeding gums  ALLERGY AND IMMUNOLOGIC: No hives or eczema	    [x ] All others negative	  [ ] Unable to obtain    PHYSICAL EXAM:  T(C): 36.7 (05-13-24 @ 04:22), Max: 36.8 (05-12-24 @ 13:30)  HR: 73 (05-13-24 @ 04:22) (73 - 100)  BP: 132/81 (05-13-24 @ 04:22) (132/81 - 165/67)  RR: 18 (05-13-24 @ 04:22) (18 - 18)  SpO2: 93% (05-13-24 @ 04:22) (91% - 95%)  Wt(kg): --  I&O's Summary    12 May 2024 07:01  -  13 May 2024 07:00  --------------------------------------------------------  IN: 0 mL / OUT: 550 mL / NET: -550 mL        Appearance: Normal	  HEENT:   Normal oral mucosa, PERRL, EOMI	  Lymphatic: No lymphadenopathy  Cardiovascular: Normal S1 S2, No JVD,+ murmurs, No edema  Respiratory: rhonchi  Psychiatry: A & O x 3, Mood & affect appropriate  Gastrointestinal:  Soft, Non-tender, + BS	  Skin: No rashes, No ecchymoses, No cyanosis	  Neurologic: Non-focal  Extremities: Normal range of motion, No clubbing, cyanosis or edema  Vascular: Peripheral pulses palpable 2+ bilaterally    MEDICATIONS  (STANDING):  albuterol/ipratropium for Nebulization 3 milliLiter(s) Nebulizer every 6 hours  ascorbic acid 500 milliGRAM(s) Oral daily  cefuroxime   Tablet 500 milliGRAM(s) Oral every 12 hours  dextrose 10% Bolus 125 milliLiter(s) IV Bolus once  dextrose 5%. 1000 milliLiter(s) (100 mL/Hr) IV Continuous <Continuous>  dextrose 5%. 1000 milliLiter(s) (50 mL/Hr) IV Continuous <Continuous>  dextrose 50% Injectable 25 Gram(s) IV Push once  dextrose 50% Injectable 12.5 Gram(s) IV Push once  enoxaparin Injectable 40 milliGRAM(s) SubCutaneous every 24 hours  glucagon  Injectable 1 milliGRAM(s) IntraMuscular once  influenza  Vaccine (HIGH DOSE) 0.7 milliLiter(s) IntraMuscular once  insulin lispro (ADMELOG) corrective regimen sliding scale   SubCutaneous three times a day before meals  losartan 50 milliGRAM(s) Oral daily  multivitamin 1 Tablet(s) Oral daily  predniSONE   Tablet 30 milliGRAM(s) Oral daily      TELEMETRY: 	    ECG:  	  RADIOLOGY:  OTHER: 	  	  LABS:	 	    CARDIAC MARKERS:                                12.6   9.98  )-----------( 287      ( 12 May 2024 06:59 )             40.3           proBNP:   Lipid Profile:   HgA1c:   TSH:       < from: CT Angio Chest PE Protocol w/ IV Cont (05.07.24 @ 02:21) >  No evidence of pulmonary embolism.    Right lower lobe post obstructive pneumonia.    Right-sided perihilar lymph nodes causing mass effect on the adjacent   bronchi.    Left-sided adrenal nodule.        Assessment and plan  ---------------------------  88-year-old female without any prevalent significant past medical condition chief complaint of shortness of breath ongoing the past couple of days or so.  Patient noted to be hypoxic by EMS to 88%.  Patient does not wear any home oxygen at home.  Patient present with aide who also has paperwork showing she is DNR DNI/with the MOSLT form.  pt with hypoxia ?etiology  r/o PE/  +pneumoniae  awaiting CTA of the chest  dvt prophylaxis  continue iv abx , CTA noted  TTE noted  pulmonary eval noted, appreciated  oob to chair with assistant as tolerated  losartan 25 mg daily  decrease steroid dose  pulmonary follow up  dc planning to rehab  increase losartan to 50 mg daily  refusing iv access, meds switched to po  daughter and pt are deciding on rehab or [facility]  had  a long discussion with the daughter . she wants to be dc on Tuesday so she can arrange home attendant  will discuss with care coordination  taper steroid  pulmonary follow up/ chest x ray today

## 2024-05-14 LAB
GLUCOSE BLDC GLUCOMTR-MCNC: 100 MG/DL — HIGH (ref 70–99)
GLUCOSE BLDC GLUCOMTR-MCNC: 138 MG/DL — HIGH (ref 70–99)
GLUCOSE BLDC GLUCOMTR-MCNC: 86 MG/DL — SIGNIFICANT CHANGE UP (ref 70–99)
GLUCOSE BLDC GLUCOMTR-MCNC: 89 MG/DL — SIGNIFICANT CHANGE UP (ref 70–99)

## 2024-05-14 RX ADMIN — Medication 1 TABLET(S): at 09:01

## 2024-05-14 RX ADMIN — Medication 3 MILLILITER(S): at 09:03

## 2024-05-14 RX ADMIN — Medication 500 MILLIGRAM(S): at 09:01

## 2024-05-14 RX ADMIN — Medication 30 MILLIGRAM(S): at 09:00

## 2024-05-14 RX ADMIN — LOSARTAN POTASSIUM 50 MILLIGRAM(S): 100 TABLET, FILM COATED ORAL at 09:00

## 2024-05-14 RX ADMIN — Medication 500 MILLIGRAM(S): at 09:36

## 2024-05-14 RX ADMIN — Medication 500 MILLIGRAM(S): at 17:34

## 2024-05-14 RX ADMIN — Medication 3 MILLILITER(S): at 16:46

## 2024-05-14 NOTE — PROGRESS NOTE ADULT - SUBJECTIVE AND OBJECTIVE BOX
Date of Service: 05-14-24 @ 07:31           CARDIOLOGY     PROGRESS  NOTE   ________________________________________________    CHIEF COMPLAINT:Patient is a 88y old  Female who presents with a chief complaint of sob (13 May 2024 19:47)  no complain  	  REVIEW OF SYSTEMS:  CONSTITUTIONAL: No fever, weight loss, or fatigue  EYES: No eye pain, visual disturbances, or discharge  ENT:  No difficulty hearing, tinnitus, vertigo; No sinus or throat pain  NECK: No pain or stiffness  RESPIRATORY: No cough, wheezing, chills or hemoptysis; No Shortness of Breath  CARDIOVASCULAR: No chest pain, palpitations, passing out, dizziness, or leg swelling  GASTROINTESTINAL: No abdominal or epigastric pain. No nausea, vomiting, or hematemesis; No diarrhea or constipation. No melena or hematochezia.  GENITOURINARY: No dysuria, frequency, hematuria, or incontinence  NEUROLOGICAL: No headaches, memory loss, loss of strength, numbness, or tremors  SKIN: No itching, burning, rashes, or lesions   LYMPH Nodes: No enlarged glands  ENDOCRINE: No heat or cold intolerance; No hair loss  MUSCULOSKELETAL: No joint pain or swelling; No muscle, back, or extremity pain  PSYCHIATRIC: No depression, anxiety, mood swings, or difficulty sleeping  HEME/LYMPH: No easy bruising, or bleeding gums  ALLERGY AND IMMUNOLOGIC: No hives or eczema	    [ x] All others negative	  [ ] Unable to obtain    PHYSICAL EXAM:  T(C): 36.9 (05-14-24 @ 05:04), Max: 36.9 (05-13-24 @ 13:15)  HR: 74 (05-14-24 @ 05:04) (67 - 103)  BP: 110/64 (05-14-24 @ 05:04) (110/64 - 164/74)  RR: 18 (05-14-24 @ 05:04) (18 - 18)  SpO2: 90% (05-14-24 @ 05:04) (90% - 94%)  Wt(kg): --  I&O's Summary    13 May 2024 07:01  -  14 May 2024 07:00  --------------------------------------------------------  IN: 0 mL / OUT: 350 mL / NET: -350 mL        Appearance: Normal	  HEENT:   Normal oral mucosa, PERRL, EOMI	  Lymphatic: No lymphadenopathy  Cardiovascular: Normal S1 S2, No JVD,+ murmurs, No edema  Respiratory:rhonchi  Psychiatry: A & O x 3, Mood & affect appropriate  Gastrointestinal:  Soft, Non-tender, + BS	  Skin: No rashes, No ecchymoses, No cyanosis	  Extremities: Normal range of motion, No clubbing, cyanosis or edema  Vascular: Peripheral pulses palpable 2+ bilaterally    MEDICATIONS  (STANDING):  albuterol/ipratropium for Nebulization 3 milliLiter(s) Nebulizer every 6 hours  ascorbic acid 500 milliGRAM(s) Oral daily  cefuroxime   Tablet 500 milliGRAM(s) Oral every 12 hours  dextrose 10% Bolus 125 milliLiter(s) IV Bolus once  dextrose 5%. 1000 milliLiter(s) (50 mL/Hr) IV Continuous <Continuous>  dextrose 5%. 1000 milliLiter(s) (100 mL/Hr) IV Continuous <Continuous>  dextrose 50% Injectable 25 Gram(s) IV Push once  dextrose 50% Injectable 12.5 Gram(s) IV Push once  enoxaparin Injectable 40 milliGRAM(s) SubCutaneous every 24 hours  glucagon  Injectable 1 milliGRAM(s) IntraMuscular once  influenza  Vaccine (HIGH DOSE) 0.7 milliLiter(s) IntraMuscular once  insulin lispro (ADMELOG) corrective regimen sliding scale   SubCutaneous three times a day before meals  losartan 50 milliGRAM(s) Oral daily  multivitamin 1 Tablet(s) Oral daily  predniSONE   Tablet 30 milliGRAM(s) Oral daily      TELEMETRY: 	    ECG:  	  RADIOLOGY:  OTHER: 	  	  LABS:	 	    CARDIAC MARKERS:      proBNP:   Lipid Profile:   HgA1c:   TSH:       < from: Xray Chest 1 View- PORTABLE-Urgent (Xray Chest 1 View- PORTABLE-Urgent .) (05.13.24 @ 09:19) >  The heart is normal in size.  Hazy right basilar opacity. Left lung is clear.  There is no pneumothorax or left-sided pleural effusion.  Hiatal hernia.  Stable right rib deformities. No acute bony abnormality.    IMPRESSION:  Hazy right basilar opacity, better evaluated on prior CT.      Notes: LCSW reviewed previous care coordiantion notes. PT rec.home PT and no  new DME. LCSW left voicemail for [facility] Rashid at [facility]  [phone] regarding PT rec., requested [phone]. Pharmacy is [facility].  Social Work to follow on PICU.      Assessment and plan  ---------------------------  88-year-old female without any prevalent significant past medical condition chief complaint of shortness of breath ongoing the past couple of days or so.  Patient noted to be hypoxic by EMS to 88%.  Patient does not wear any home oxygen at home.  Patient present with aide who also has paperwork showing she is DNR DNI/with the MOSLT form.  pt with hypoxia ?etiology  r/o PE/  +pneumoniae  awaiting CTA of the chest  dvt prophylaxis  continue iv abx , CTA noted  TTE noted  pulmonary eval noted, appreciated  oob to chair with assistant as tolerated  losartan 25 mg daily  decrease steroid dose  pulmonary follow up  dc planning to rehab  increase losartan to 50 mg daily  refusing iv access, meds switched to po  daughter and pt are deciding on rehab or [facility]  had  a long discussion with the daughter . she wants to be dc on Tuesday so she can arrange home attendant  will discuss with care coordination  taper steroid  pulmonary follow up, awaiting   dc planning , care coordination appreciated    	         Date of Service: 05-14-24 @ 07:31           CARDIOLOGY     PROGRESS  NOTE   ________________________________________________    CHIEF COMPLAINT:Patient is a 88y old  Female who presents with a chief complaint of sob (13 May 2024 19:47)  no complain  	  REVIEW OF SYSTEMS:  CONSTITUTIONAL: No fever, weight loss, or fatigue  EYES: No eye pain, visual disturbances, or discharge  ENT:  No difficulty hearing, tinnitus, vertigo; No sinus or throat pain  NECK: No pain or stiffness  RESPIRATORY: No cough, wheezing, chills or hemoptysis; No Shortness of Breath  CARDIOVASCULAR: No chest pain, palpitations, passing out, dizziness, or leg swelling  GASTROINTESTINAL: No abdominal or epigastric pain. No nausea, vomiting, or hematemesis; No diarrhea or constipation. No melena or hematochezia.  GENITOURINARY: No dysuria, frequency, hematuria, or incontinence  NEUROLOGICAL: No headaches, memory loss, loss of strength, numbness, or tremors  SKIN: No itching, burning, rashes, or lesions   LYMPH Nodes: No enlarged glands  ENDOCRINE: No heat or cold intolerance; No hair loss  MUSCULOSKELETAL: No joint pain or swelling; No muscle, back, or extremity pain  PSYCHIATRIC: No depression, anxiety, mood swings, or difficulty sleeping  HEME/LYMPH: No easy bruising, or bleeding gums  ALLERGY AND IMMUNOLOGIC: No hives or eczema	    [ x] All others negative	  [ ] Unable to obtain    PHYSICAL EXAM:  T(C): 36.9 (05-14-24 @ 05:04), Max: 36.9 (05-13-24 @ 13:15)  HR: 74 (05-14-24 @ 05:04) (67 - 103)  BP: 110/64 (05-14-24 @ 05:04) (110/64 - 164/74)  RR: 18 (05-14-24 @ 05:04) (18 - 18)  SpO2: 90% (05-14-24 @ 05:04) (90% - 94%)  Wt(kg): --  I&O's Summary    13 May 2024 07:01  -  14 May 2024 07:00  --------------------------------------------------------  IN: 0 mL / OUT: 350 mL / NET: -350 mL        Appearance: Normal	  HEENT:   Normal oral mucosa, PERRL, EOMI	  Lymphatic: No lymphadenopathy  Cardiovascular: Normal S1 S2, No JVD,+ murmurs, No edema  Respiratory:rhonchi  Psychiatry: A & O x 3, Mood & affect appropriate  Gastrointestinal:  Soft, Non-tender, + BS	  Skin: No rashes, No ecchymoses, No cyanosis	  Extremities: Normal range of motion, No clubbing, cyanosis or edema  Vascular: Peripheral pulses palpable 2+ bilaterally    MEDICATIONS  (STANDING):  albuterol/ipratropium for Nebulization 3 milliLiter(s) Nebulizer every 6 hours  ascorbic acid 500 milliGRAM(s) Oral daily  cefuroxime   Tablet 500 milliGRAM(s) Oral every 12 hours  dextrose 10% Bolus 125 milliLiter(s) IV Bolus once  dextrose 5%. 1000 milliLiter(s) (50 mL/Hr) IV Continuous <Continuous>  dextrose 5%. 1000 milliLiter(s) (100 mL/Hr) IV Continuous <Continuous>  dextrose 50% Injectable 25 Gram(s) IV Push once  dextrose 50% Injectable 12.5 Gram(s) IV Push once  enoxaparin Injectable 40 milliGRAM(s) SubCutaneous every 24 hours  glucagon  Injectable 1 milliGRAM(s) IntraMuscular once  influenza  Vaccine (HIGH DOSE) 0.7 milliLiter(s) IntraMuscular once  insulin lispro (ADMELOG) corrective regimen sliding scale   SubCutaneous three times a day before meals  losartan 50 milliGRAM(s) Oral daily  multivitamin 1 Tablet(s) Oral daily  predniSONE   Tablet 30 milliGRAM(s) Oral daily      TELEMETRY: 	    ECG:  	  RADIOLOGY:  OTHER: 	  	  LABS:	 	    CARDIAC MARKERS:      proBNP:   Lipid Profile:   HgA1c:   TSH:       < from: Xray Chest 1 View- PORTABLE-Urgent (Xray Chest 1 View- PORTABLE-Urgent .) (05.13.24 @ 09:19) >  The heart is normal in size.  Hazy right basilar opacity. Left lung is clear.  There is no pneumothorax or left-sided pleural effusion.  Hiatal hernia.  Stable right rib deformities. No acute bony abnormality.    IMPRESSION:  Hazy right basilar opacity, better evaluated on prior CT.      Notes: LCSW reviewed previous care coordiantion notes. PT rec.home PT and no  new DME. LCSW left voicemail for [facility] Haw[facility] Rashid at [facility]  [phone] regarding PT rec., requested [phone]. Pharmacy is [facility].  Social Work to follow on PICU.      Assessment and plan  ---------------------------  88-year-old female without any prevalent significant past medical condition chief complaint of shortness of breath ongoing the past couple of days or so.  Patient noted to be hypoxic by EMS to 88%.  Patient does not wear any home oxygen at home.  Patient present with aide who also has paperwork showing she is DNR DNI/with the MOSLT form.  pt with hypoxia ?etiology  r/o PE/  +pneumoniae  awaiting CTA of the chest  dvt prophylaxis  TTE noted  oob to chair with assistant as tolerated  losartan 25 mg daily  decrease steroid dose  increase losartan to 50 mg daily  refusing iv access, meds switched to po  will discuss with care coordination  taper steroid  pulmonary follow up, awaiting , pt still wheezing and refusing IV placement  dc planning , care coordination appreciated

## 2024-05-14 NOTE — CHART NOTE - NSCHARTNOTEFT_GEN_A_CORE
MEDICINE PA NOTE    Discussed MOLST form with patient and daughter Daxa. At this point, patient agrees to CPR, trial intubation, IVF, antibiotics, no feeding tube and no dialysis. A MOLST form was completed based on these inputs, with RN as witness.
MEDICINE PA NOTE    Reached out to pulmonary to discuss recommendations for antibiotics and prednisone taper on discharge.  Recommended to stop antibiotics unless contraindicated by ID, and to decrease prednisone by 10mg every 3 days.   Patient currently on 3L NC, desatting to 88% at rest on room air. Patient declining bronch or any further intervention. Home oxygen script given to CM in anticipation of discharge.
89 y/o female admitted with RLL PNA and refusing IV access and iv medications; Ceftriaxone switch Ceftin 500mg BID; azithromycin po and IV steroid switch prednisone 40mg po daily as discussed with Dr. Lee.

## 2024-05-14 NOTE — PROGRESS NOTE ADULT - SUBJECTIVE AND OBJECTIVE BOX
---___---___---___---___---___---___ ---___---___---___---___---___---___---___---___---                  M E D I C A L   A T T E N D I N G   P R O G R E S S   N O T E  ---___---___---___---___---___---___ ---___---___---___---___---___---___---___---___---        ================================================    ++CHIEF COMPLAINT:   Patient is a 88y old  Female who presents with a chief complaint of sob (14 May 2024 07:31)      Pneumonia due to infectious organism      ---___---___---___---___---___---  PAST MEDICAL / Surgical  HISTORY:  PAST MEDICAL & SURGICAL HISTORY:  No pertinent past medical history      No significant past surgical history          ---___---___---___---___---___---  FAMILY HISTORY:   FAMILY HISTORY:        ---___---___---___---___---___---  ALLERGIES:   Allergies    No Known Allergies    Intolerances        ---___---___---___---___---___---  MEDICATIONS:  MEDICATIONS  (STANDING):  albuterol/ipratropium for Nebulization 3 milliLiter(s) Nebulizer every 6 hours  ascorbic acid 500 milliGRAM(s) Oral daily  dextrose 10% Bolus 125 milliLiter(s) IV Bolus once  dextrose 5%. 1000 milliLiter(s) (50 mL/Hr) IV Continuous <Continuous>  dextrose 5%. 1000 milliLiter(s) (100 mL/Hr) IV Continuous <Continuous>  dextrose 50% Injectable 25 Gram(s) IV Push once  dextrose 50% Injectable 12.5 Gram(s) IV Push once  enoxaparin Injectable 40 milliGRAM(s) SubCutaneous every 24 hours  glucagon  Injectable 1 milliGRAM(s) IntraMuscular once  influenza  Vaccine (HIGH DOSE) 0.7 milliLiter(s) IntraMuscular once  insulin lispro (ADMELOG) corrective regimen sliding scale   SubCutaneous three times a day before meals  losartan 50 milliGRAM(s) Oral daily  multivitamin 1 Tablet(s) Oral daily  predniSONE   Tablet 30 milliGRAM(s) Oral daily    MEDICATIONS  (PRN):  dextrose Oral Gel 15 Gram(s) Oral once PRN Blood Glucose LESS THAN 70 milliGRAM(s)/deciliter      ---___---___---___---___---___---  REVIEW OF SYSTEM:    GEN: no fever, no chills, no pain  RESP: no SOB, no cough, no sputum  CVS: no chest pain, no palpitations, no edema  GI: no abdominal pain, no nausea, no vomiting, no constipation, no diarrhea  : no dysurea, no frequency, no hematurea  Neuro: no headache, no dizziness  PSYCH: no anxiety, no depression  Derm : no itching, no rash    ---___---___---___---___---___---  VITAL SIGNS:  88y , CAPILLARY BLOOD GLUCOSE      POCT Blood Glucose.: 138 mg/dL (14 May 2024 16:59)    T(C): 36.3 (05-14-24 @ 15:59), Max: 36.9 (05-14-24 @ 05:04)  HR: 83 (05-14-24 @ 15:59) (74 - 95)  BP: 120/73 (05-14-24 @ 15:59) (110/64 - 173/95)  RR: 18 (05-14-24 @ 15:59) (18 - 18)  SpO2: 95% (05-14-24 @ 15:59) (88% - 95%)  ---___---___---___---___---___---  PHYSICAL EXAM:    GEN: A&O X 3 , NAD , comfortable  HEENT: NCAT, PERRL, MMM, hearing intact  Neck: supple , no JVD  CVS: S1S2 , regular , No M/R/G appreciated  PULM: CTA B/L,  no W/R/R appreciated  ABD.: soft. non tender, non distended,  bowel sounds present  Extrem: intact pulses , no edema   Derm: No rash , no ecchymoses  PSYCH : normal mood,  no delusion not anxious     ---___---___---___---___---___---            LAB AND IMAGING:                                                  [All pertinent / recent Imaging reviewed]         ---___---___---___---___---___---___ ---___---___---___---___---                         A S S E S S M E N T   A N D   P L A N :      HEALTH ISSUES - PROBLEM Dx:  pneumonia   due to infectious disease   wheezing  continue iv abx   and on o2   htn on meds      start dc planning                     -GI/DVT Prophylaxis.    --------------------------------------------  Case discussed with   Education given on   ___________________________  Thank you,  Kole Prieto  [phone]

## 2024-05-15 ENCOUNTER — TRANSCRIPTION ENCOUNTER (OUTPATIENT)
Age: 89
End: 2024-05-15

## 2024-05-15 VITALS
RESPIRATION RATE: 18 BRPM | HEART RATE: 89 BPM | DIASTOLIC BLOOD PRESSURE: 84 MMHG | SYSTOLIC BLOOD PRESSURE: 143 MMHG | OXYGEN SATURATION: 94 % | TEMPERATURE: 98 F

## 2024-05-15 LAB
GLUCOSE BLDC GLUCOMTR-MCNC: 124 MG/DL — HIGH (ref 70–99)
GLUCOSE BLDC GLUCOMTR-MCNC: 95 MG/DL — SIGNIFICANT CHANGE UP (ref 70–99)

## 2024-05-15 PROCEDURE — 80048 BASIC METABOLIC PNL TOTAL CA: CPT

## 2024-05-15 PROCEDURE — 97110 THERAPEUTIC EXERCISES: CPT

## 2024-05-15 PROCEDURE — 92610 EVALUATE SWALLOWING FUNCTION: CPT

## 2024-05-15 PROCEDURE — 82803 BLOOD GASES ANY COMBINATION: CPT

## 2024-05-15 PROCEDURE — 84132 ASSAY OF SERUM POTASSIUM: CPT

## 2024-05-15 PROCEDURE — 85730 THROMBOPLASTIN TIME PARTIAL: CPT

## 2024-05-15 PROCEDURE — 87449 NOS EACH ORGANISM AG IA: CPT

## 2024-05-15 PROCEDURE — 85025 COMPLETE CBC W/AUTO DIFF WBC: CPT

## 2024-05-15 PROCEDURE — 87070 CULTURE OTHR SPECIMN AEROBIC: CPT

## 2024-05-15 PROCEDURE — 96375 TX/PRO/DX INJ NEW DRUG ADDON: CPT

## 2024-05-15 PROCEDURE — 85014 HEMATOCRIT: CPT

## 2024-05-15 PROCEDURE — 94640 AIRWAY INHALATION TREATMENT: CPT

## 2024-05-15 PROCEDURE — 85027 COMPLETE CBC AUTOMATED: CPT

## 2024-05-15 PROCEDURE — 82962 GLUCOSE BLOOD TEST: CPT

## 2024-05-15 PROCEDURE — 71045 X-RAY EXAM CHEST 1 VIEW: CPT

## 2024-05-15 PROCEDURE — 83036 HEMOGLOBIN GLYCOSYLATED A1C: CPT

## 2024-05-15 PROCEDURE — 99233 SBSQ HOSP IP/OBS HIGH 50: CPT

## 2024-05-15 PROCEDURE — 87640 STAPH A DNA AMP PROBE: CPT

## 2024-05-15 PROCEDURE — 74177 CT ABD & PELVIS W/CONTRAST: CPT | Mod: MC

## 2024-05-15 PROCEDURE — 87637 SARSCOV2&INF A&B&RSV AMP PRB: CPT

## 2024-05-15 PROCEDURE — 93306 TTE W/DOPPLER COMPLETE: CPT

## 2024-05-15 PROCEDURE — 99285 EMERGENCY DEPT VISIT HI MDM: CPT | Mod: 25

## 2024-05-15 PROCEDURE — 87641 MR-STAPH DNA AMP PROBE: CPT

## 2024-05-15 PROCEDURE — 85018 HEMOGLOBIN: CPT

## 2024-05-15 PROCEDURE — 82947 ASSAY GLUCOSE BLOOD QUANT: CPT

## 2024-05-15 PROCEDURE — 87040 BLOOD CULTURE FOR BACTERIA: CPT

## 2024-05-15 PROCEDURE — 36415 COLL VENOUS BLD VENIPUNCTURE: CPT

## 2024-05-15 PROCEDURE — 85610 PROTHROMBIN TIME: CPT

## 2024-05-15 PROCEDURE — 82435 ASSAY OF BLOOD CHLORIDE: CPT

## 2024-05-15 PROCEDURE — 82330 ASSAY OF CALCIUM: CPT

## 2024-05-15 PROCEDURE — 83605 ASSAY OF LACTIC ACID: CPT

## 2024-05-15 PROCEDURE — 80053 COMPREHEN METABOLIC PANEL: CPT

## 2024-05-15 PROCEDURE — 97530 THERAPEUTIC ACTIVITIES: CPT

## 2024-05-15 PROCEDURE — 71275 CT ANGIOGRAPHY CHEST: CPT | Mod: MC

## 2024-05-15 PROCEDURE — 84295 ASSAY OF SERUM SODIUM: CPT

## 2024-05-15 PROCEDURE — 96374 THER/PROPH/DIAG INJ IV PUSH: CPT

## 2024-05-15 RX ORDER — LOSARTAN POTASSIUM 100 MG/1
1 TABLET, FILM COATED ORAL
Qty: 30 | Refills: 0
Start: 2024-05-15 | End: 2024-06-13

## 2024-05-15 RX ORDER — ASCORBIC ACID 60 MG
1 TABLET,CHEWABLE ORAL
Qty: 0 | Refills: 0 | DISCHARGE
Start: 2024-05-15

## 2024-05-15 RX ORDER — IPRATROPIUM/ALBUTEROL SULFATE 18-103MCG
3 AEROSOL WITH ADAPTER (GRAM) INHALATION
Qty: 1 | Refills: 0
Start: 2024-05-15 | End: 2024-06-13

## 2024-05-15 RX ORDER — ALBUTEROL 90 UG/1
2 AEROSOL, METERED ORAL
Qty: 1 | Refills: 0
Start: 2024-05-15 | End: 2024-06-13

## 2024-05-15 RX ORDER — BUDESONIDE AND FORMOTEROL FUMARATE DIHYDRATE 160; 4.5 UG/1; UG/1
2 AEROSOL RESPIRATORY (INHALATION)
Qty: 1 | Refills: 0
Start: 2024-05-15 | End: 2024-06-13

## 2024-05-15 RX ORDER — RIVAROXABAN 15 MG-20MG
1 KIT ORAL
Qty: 0 | Refills: 0 | DISCHARGE

## 2024-05-15 RX ORDER — LOSARTAN POTASSIUM 100 MG/1
1 TABLET, FILM COATED ORAL
Qty: 0 | Refills: 0 | DISCHARGE
Start: 2024-05-15

## 2024-05-15 RX ADMIN — Medication 3 MILLILITER(S): at 09:40

## 2024-05-15 RX ADMIN — Medication 30 MILLIGRAM(S): at 09:40

## 2024-05-15 RX ADMIN — LOSARTAN POTASSIUM 50 MILLIGRAM(S): 100 TABLET, FILM COATED ORAL at 09:40

## 2024-05-15 RX ADMIN — Medication 500 MILLIGRAM(S): at 11:56

## 2024-05-15 RX ADMIN — Medication 1 TABLET(S): at 11:56

## 2024-05-15 RX ADMIN — Medication 3 MILLILITER(S): at 03:41

## 2024-05-15 NOTE — PROVIDER CONTACT NOTE (OTHER) - ASSESSMENT
Pt is A&Ox4. VSS. Pt denies pain and discomfort. Pt is refusing am meds. Pt states that she wants to take medications after breakfast.
Pt states I don't want anything with needles. Education was provided on reason for need of it.

## 2024-05-15 NOTE — DISCHARGE NOTE PROVIDER - NSFOLLOWUPCLINICS_GEN_ALL_ED_FT
Gastroenterology at [facility]  Gastroenterology  [address] [place] [id number]  Phone: [phone]  Fax:   Follow Up Time: Routine    [facility] Pulmonolgy and Sleep Medicine  Pulmonology  [address] [place] [address]  Phone: [phone]  Fax:   Follow Up Time: Routine

## 2024-05-15 NOTE — PROVIDER CONTACT NOTE (OTHER) - SITUATION
Pt is refusing am medications. Pt states that she will take medications after breakfast.
Pt is refusing IV access and most medications

## 2024-05-15 NOTE — PROGRESS NOTE ADULT - SUBJECTIVE AND OBJECTIVE BOX
Date of Service: 05-15-24 @ 07:38           CARDIOLOGY     PROGRESS  NOTE   ________________________________________________    CHIEF COMPLAINT:Patient is a 88y old  Female who presents with a chief complaint of sob (14 May 2024 20:42)  no complain  	  REVIEW OF SYSTEMS:  CONSTITUTIONAL: No fever, weight loss, or fatigue  EYES: No eye pain, visual disturbances, or discharge  ENT:  No difficulty hearing, tinnitus, vertigo; No sinus or throat pain  NECK: No pain or stiffness  RESPIRATORY: No cough, wheezing, chills or hemoptysis; No Shortness of Breath  CARDIOVASCULAR: No chest pain, palpitations, passing out, dizziness, or leg swelling  GASTROINTESTINAL: No abdominal or epigastric pain. No nausea, vomiting, or hematemesis; No diarrhea or constipation. No melena or hematochezia.  GENITOURINARY: No dysuria, frequency, hematuria, or incontinence  NEUROLOGICAL: No headaches, memory loss, loss of strength, numbness, or tremors  SKIN: No itching, burning, rashes, or lesions   LYMPH Nodes: No enlarged glands  ENDOCRINE: No heat or cold intolerance; No hair loss  MUSCULOSKELETAL: No joint pain or swelling; No muscle, back, or extremity pain  PSYCHIATRIC: No depression, anxiety, mood swings, or difficulty sleeping  HEME/LYMPH: No easy bruising, or bleeding gums  ALLERGY AND IMMUNOLOGIC: No hives or eczema	    x[ ] All others negative	  [ ] Unable to obtain    PHYSICAL EXAM:  T(C): 36.3 (05-15-24 @ 04:58), Max: 37 (05-14-24 @ 20:00)  HR: 66 (05-15-24 @ 04:58) (66 - 95)  BP: 121/75 (05-15-24 @ 04:58) (120/73 - 173/95)  RR: 16 (05-15-24 @ 04:58) (16 - 18)  SpO2: 93% (05-15-24 @ 04:58) (88% - 96%)  Wt(kg): --  I&O's Summary    14 May 2024 07:01  -  15 May 2024 07:00  --------------------------------------------------------  IN: 0 mL / OUT: 500 mL / NET: -500 mL        Appearance: Normal	  HEENT:   Normal oral mucosa, PERRL, EOMI	  Lymphatic: No lymphadenopathy  Cardiovascular: Normal S1 S2, No JVD, + murmurs, No edema  Respiratory: rhonchi  Psychiatry: A & O x 3, Mood & affect appropriate  Gastrointestinal:  Soft, Non-tender, + BS	  Skin: No rashes, No ecchymoses, No cyanosis	  Neurologic: Non-focal  Extremities: Normal range of motion, No clubbing, cyanosis or edema  Vascular: Peripheral pulses palpable 2+ bilaterally    MEDICATIONS  (STANDING):  albuterol/ipratropium for Nebulization 3 milliLiter(s) Nebulizer every 6 hours  ascorbic acid 500 milliGRAM(s) Oral daily  dextrose 10% Bolus 125 milliLiter(s) IV Bolus once  dextrose 5%. 1000 milliLiter(s) (50 mL/Hr) IV Continuous <Continuous>  dextrose 5%. 1000 milliLiter(s) (100 mL/Hr) IV Continuous <Continuous>  dextrose 50% Injectable 25 Gram(s) IV Push once  dextrose 50% Injectable 12.5 Gram(s) IV Push once  enoxaparin Injectable 40 milliGRAM(s) SubCutaneous every 24 hours  glucagon  Injectable 1 milliGRAM(s) IntraMuscular once  influenza  Vaccine (HIGH DOSE) 0.7 milliLiter(s) IntraMuscular once  insulin lispro (ADMELOG) corrective regimen sliding scale   SubCutaneous three times a day before meals  losartan 50 milliGRAM(s) Oral daily  multivitamin 1 Tablet(s) Oral daily  predniSONE   Tablet 30 milliGRAM(s) Oral daily      TELEMETRY: 	    ECG:  	  RADIOLOGY:  OTHER: 	  	  LABS:	 	    CARDIAC MARKERS:      proBNP:   Lipid Profile:   HgA1c:   TSH:     < from: Xray Chest 1 View- PORTABLE-Urgent (Xray Chest 1 View- PORTABLE-Urgent .) (05.13.24 @ 09:19) >  The heart is normal in size.  Hazy right basilar opacity. Left lung is clear.  There is no pneumothorax or left-sided pleural effusion.  Hiatal hernia.  Stable right rib deformities. No acute bony abnormality.    IMPRESSION:  Hazy right basilar opacity, better evaluated on prior CT.      Assessment and plan  ---------------------------  88-year-old female without any prevalent significant past medical condition chief complaint of shortness of breath ongoing the past couple of days or so.  Patient noted to be hypoxic by EMS to 88%.  Patient does not wear any home oxygen at home.  Patient present with aide who also has paperwork showing she is DNR DNI/with the [facility] form.  pt with hypoxia ?etiology  r/o PE/  +pneumoniae  awaiting CTA of the chest  dvt prophylaxis  TTE noted  oob to chair with assistant as tolerated  losartan 25 mg daily  decrease steroid dose  increase losartan to 50 mg daily  refusing iv access, meds switched to po  will discuss with care coordination  taper steroid  pulmonary follow up, awaiting , pt still wheezing and refusing IV placement  dc planning , care coordination appreciated  awaiting pulmonary follow up / home o2  pt needs to fu with her chest x ray and ct, daughter aware

## 2024-05-15 NOTE — PROGRESS NOTE ADULT - SUBJECTIVE AND OBJECTIVE BOX
[facility] DIVISION OF PULMONARY, CRITICAL CARE and SLEEP MEDICINE  PULMONARY PROGRESS NOTE  OFFICE NUMBER: [phone]    PATIENT INFORMATION:  NAME: BILLY BERMUDEZ:  MRN: [id number]    CHIEF COMPLAINT: Patient is a 88y old  Female who presents with a chief complaint of sob (15 May 2024 12:27)      [x] INITIAL CONSULT, H&P, FAMILY HISTORY and PAST MEDICAL AND SURGICAL HISTORY REVIEWED    OVERNIGHT EVENTS or CHANGES TO HPI: Patient today states she feels well though she has wheezing bilaterally. She denies dyspnea and cough. No prior pulmonary history.     I explained her CT chest scan findings in detail including that she has mediastinal lymphadenopathy causing compression of bronchi, putting her at risk for further atelectasis, recurrent post obstructive pneumonia, worsening respiratory failure. I told her another time that we would recommend biopsy to rule out malignancy and that there is a suspicion for malignant process. Patient declines all invasive procedures at this time. She stated she fully understands risks and benefits but cites her age, prefers nothing invasive even if there is a risk of cancer.     Discussed with nurse and her aide at bedside, patient always answers questions appropriately. No significant memory deficits.     ========================MEDICATIONS=============================  MEDICATIONS  (STANDING):  albuterol/ipratropium for Nebulization 3 milliLiter(s) Nebulizer every 6 hours  ascorbic acid 500 milliGRAM(s) Oral daily  dextrose 10% Bolus 125 milliLiter(s) IV Bolus once  dextrose 5%. 1000 milliLiter(s) (50 mL/Hr) IV Continuous <Continuous>  dextrose 5%. 1000 milliLiter(s) (100 mL/Hr) IV Continuous <Continuous>  dextrose 50% Injectable 25 Gram(s) IV Push once  dextrose 50% Injectable 12.5 Gram(s) IV Push once  enoxaparin Injectable 40 milliGRAM(s) SubCutaneous every 24 hours  glucagon  Injectable 1 milliGRAM(s) IntraMuscular once  influenza  Vaccine (HIGH DOSE) 0.7 milliLiter(s) IntraMuscular once  insulin lispro (ADMELOG) corrective regimen sliding scale   SubCutaneous three times a day before meals  losartan 50 milliGRAM(s) Oral daily  multivitamin 1 Tablet(s) Oral daily  predniSONE   Tablet 30 milliGRAM(s) Oral daily      MEDICATIONS  (PRN):  dextrose Oral Gel 15 Gram(s) Oral once PRN Blood Glucose LESS THAN 70 milliGRAM(s)/deciliter      ========================PHYSICAL EXAM============================    VITALS: ICU Vital Signs Last 24 Hrs  T(C): 36.7 (15 May 2024 11:28), Max: 37 (14 May 2024 20:00)  T(F): 98.1 (15 May 2024 11:28), Max: 98.6 (14 May 2024 20:00)  HR: 76 (15 May 2024 11:28) (66 - 90)  BP: 141/74 (15 May 2024 11:28) (120/73 - 164/84)  BP(mean): --  ABP: --  ABP(mean): --  RR: 18 (15 May 2024 11:28) (16 - 18)  SpO2: 91% (15 May 2024 11:28) (91% - 96%)    O2 Parameters below as of 15 May 2024 11:28  Patient On (Oxygen Delivery Method): nasal cannula  O2 Flow (L/min): 3          INTAKE and OUTPUT: I&O's Summary    14 May 2024 07:01  -  15 May 2024 07:00  --------------------------------------------------------  IN: 0 mL / OUT: 500 mL / NET: -500 mL         GENERAL:   EAR/NOSE/MOUTH/THROAT:  NECK:  CARDIOVASCULAR:  RESPIRATORY:  EXTREMITIES  NEUROLOGIC:  PSYCHIATRIC    ========================LABORATORY RESULTS AND IMAGING=============                                                               Creatinine Trend: 1.09<--, 1.05<--, 1.11<--    CT CHEST:     [x] RADIOLOGY REVIEWED AND INTERPRETED BY ME      THANK YOU FOR ALLOWING US TO PARTICIPATE IN THE CARE OF THIS PATIENT

## 2024-05-15 NOTE — PROGRESS NOTE ADULT - ASSESSMENT
88-year-old female without any prevalent significant past medical condition chief complaint of shortness of breath.  Patient noted to be hypoxic by EMS to 88%.  She continues to require 3L NC at rest. During this hospitalization she was treated for post obstructive pneumonia initially with ceftriaxone, transitioned to Cefitin - completed a 14 day course total. She has no known history of asthma or emphysema. She is wheezing on exam though denies dyspnea or cough.     Her CT chest showed "no evidence of pulmonary embolus. Patchy subsegmental areas of airspace consolidation within the right lower lobe; most likely developing pneumonia. Right-sided perihilar enlarged lymph nodes measuring up to 1.5 cm in the short axis. These causing significant mass effect on the adjacent bronchi. Proximal right lower lobe bronchial branches are nearly completely obliterated by the mass effect of the adjacent lymphadenopathy"     I explained her CT chest scan findings in detail including that she has mediastinal lymphadenopathy causing compression of bronchi, putting her at risk for further atelectasis, recurrent post obstructive pneumonia, worsening respiratory failure. I told her another time that we would recommend biopsy to rule out malignancy and that there is a suspicion for malignant process. Patient declines all invasive procedures at this time. She stated she fully understands risks and benefits but cites her age, prefers nothing invasive even if there is a risk of cancer.     Discussed with nurse and her aide at bedside, patient always answers questions appropriately. No significant memory deficits. SHe makes all her health care decisions    - complete prednisone taper  - currently on prednisone 30 mg po daily - tapering by 10 mg every 3 days.   - can give acapella valve to encourage mucous clearance, chest pt as tolerated   - would continue duoneb q6h standing   - out of bed to chair and early ambulation as able  - Incentive spirometry  - outpatient PFTs  - would discharge her with prescription for symbicort 160/4.5 2 puffs bid, albuterol HFA q4h prn wheezing -   - will require outpatient PFTs to check if has obstructive or restrictive process  - will require repeat CT chest in 6-8 weeks     I offered her follow up in our pulmonary clinic. Please use HOME token to arrange for discharge pulmonary appointment     Per aide at bedside her PMD is aware of above findings.

## 2024-05-15 NOTE — DISCHARGE NOTE NURSING/CASE MANAGEMENT/SOCIAL WORK - PATIENT PORTAL LINK FT
You can access the FollowMyHealth Patient Portal offered by [facility] by registering at the following website: [email or web address] By joining [employer] ([employer]’s FollowMyHealth portal, you will also be able to view your health information using other applications (apps) compatible with our system.

## 2024-05-15 NOTE — DISCHARGE NOTE NURSING/CASE MANAGEMENT/SOCIAL WORK - NSDCPEFALRISK_GEN_ALL_CORE
For information on Fall & Injury Prevention, visit: [email or web address] OR  [email or web address] OR  https://www.cdc.gov/steadi/patient.html

## 2024-05-15 NOTE — PROVIDER CONTACT NOTE (OTHER) - BACKGROUND
Pt presenting with SOB and diagnosed with PNA.
89 y/o female without any prevalent significant pmh admitted with PNA due to infectious organism.

## 2024-05-15 NOTE — DISCHARGE NOTE PROVIDER - NSDCMRMEDTOKEN_GEN_ALL_CORE_FT
ascorbic acid 500 mg oral tablet: 1 tab(s) orally once a day  losartan 50 mg oral tablet: 1 tab(s) orally once a day  multivitamin:    ascorbic acid 500 mg oral tablet: 1 tab(s) orally once a day  losartan 50 mg oral tablet: 1 tab(s) orally once a day  multivitamin:   predniSONE 10 mg oral tablet: 2 tab(s) orally once a day Starting 5/16, take 20 mg for 3 days  Then on 5/19, take 10 mg for 3 days  predniSONE 10 mg oral tablet: 1 tab(s) orally once a day   ascorbic acid 500 mg oral tablet: 1 tab(s) orally once a day  ipratropium-albuterol 0.5 mg-2.5 mg/3 mL inhalation solution: 3 milliliter(s) by nebulizer every 6 hours  losartan 50 mg oral tablet: 1 tab(s) orally once a day  multivitamin:   Nebulizer machine: Please use as directed  predniSONE 10 mg oral tablet: 2 tab(s) orally once a day Starting 5/16, take 20 mg for 3 days  Then on 5/19, take 10 mg for 3 days  predniSONE 10 mg oral tablet: 1 tab(s) orally once a day  ProAir HFA 90 mcg/inh inhalation aerosol: 2 puff(s) inhaled every 4 hours as needed for  bronchospasm  Symbicort 160 mcg-4.5 mcg/inh inhalation aerosol: 2 puff(s) inhaled 2 times a day

## 2024-05-15 NOTE — DISCHARGE NOTE PROVIDER - HOSPITAL COURSE
88-year-old female without any prevalent significant past medical condition chief complaint of shortness of breath ongoing the past couple of days or so. Patient noted to be hypoxic by EMS to 88%. Patient does not wear any home oxygen at home.     CTA Chest  No evidence of pulmonary embolism.  Right lower lobe post obstructive pneumonia.  Right-sided perihilar lymph nodes causing mass effect on the adjacent bronchi.  Left-sided adrenal nodule.    TTE   1. Left ventricular wall thickness is normal.   2. The right ventricle is not well visualized.   3. Aortic valve was not well visualized.   4. No pericardial effusion seen.   5. Severely limited images. Patient would not complete exam.   6. Left ventricular endocardium is not well visualized; however, the left ventricular systolic function appears grossly normal.    CT findings with mediastinal lymphadenopathy and RLL post obstructive pneumonia. Seen by pulmonary.   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 Patient refusing bronchoscopy and any further intervention. Treated for post obstructive pneumonia with CTX and azithromycin from 5/7 to 5/10, and switched to Ceftin from 5/10-5/14. No need for further abx per pulm.   Received few doses of IV Solumedrol, then switched to oral prednisone- plan to decrease by 10mg every 3 days and then stop per pulm.   Patient desatting to 88% on room air, setup with home oxygen, titrate oxygen to 88-92%.   Patient medically cleared and to follow up with PCP, pulmonary and GI after discharge.

## 2024-05-15 NOTE — PROGRESS NOTE ADULT - PROVIDER SPECIALTY LIST ADULT
Cardiology
Family Medicine
Family Medicine
Pulmonology
Cardiology
Family Medicine
Internal Medicine
Cardiology
Cardiology
Family Medicine
Internal Medicine

## 2024-05-15 NOTE — DISCHARGE NOTE PROVIDER - NSDCCPCAREPLAN_GEN_ALL_CORE_FT
PRINCIPAL DISCHARGE DIAGNOSIS  Diagnosis: Pneumonia  Assessment and Plan of Treatment: Treated for post obstructive pneumonia with ceftriaxone and azithromycin from 5/7 to 5/10, and switched to Ceftin from 5/10-5/14. No need for further antibiotics  Continue steroid taper as prescribed.   Continue on oxygen supplementation, titrate oxygen to 88-92%.   Follow up with PCP and pulmonologist after discharge.      SECONDARY DISCHARGE DIAGNOSES  Diagnosis: Lymphadenopathy  Assessment and Plan of Treatment:   CTA Chest:  No evidence of pulmonary embolism.  Right lower lobe post obstructive pneumonia.  Right-sided perihilar lymph nodes causing mass effect on the adjacent   bronchi.  Left-sided adrenal nodule.  Follow up with your PCP and pulmonologist after discharge for abnormal findings.       Diagnosis: Hypertension  Assessment and Plan of Treatment: Continue losartan 50mg daily.     PRINCIPAL DISCHARGE DIAGNOSIS  Diagnosis: Pneumonia  Assessment and Plan of Treatment: Treated for post obstructive pneumonia with ceftriaxone and azithromycin from 5/7 to 5/10, and switched to Ceftin from 5/10-5/14. No need for further antibiotics  Continue steroid taper as prescribed.   Continue on oxygen supplementation, titrate oxygen to 88-92%.   Continue bronchodilators as prescribed.  Follow up with PCP and pulmonologist after discharge. Will need a repeat CT Chest in 6-8 weeks.      SECONDARY DISCHARGE DIAGNOSES  Diagnosis: Lymphadenopathy  Assessment and Plan of Treatment:   CTA Chest:  No evidence of pulmonary embolism.  Right lower lobe post obstructive pneumonia.  Right-sided perihilar lymph nodes causing mass effect on the adjacent   bronchi.  Left-sided adrenal nodule.  Follow up with your PCP and pulmonologist after discharge for abnormal findings.       Diagnosis: Hypertension  Assessment and Plan of Treatment: Continue losartan 50mg daily.

## 2024-05-20 ENCOUNTER — TRANSCRIPTION ENCOUNTER (OUTPATIENT)
Age: 89
End: 2024-05-20

## 2024-05-31 ENCOUNTER — TRANSCRIPTION ENCOUNTER (OUTPATIENT)
Age: 89
End: 2024-05-31

## 2024-06-10 ENCOUNTER — TRANSCRIPTION ENCOUNTER (OUTPATIENT)
Age: 89
End: 2024-06-10

## 2024-07-29 NOTE — PATIENT PROFILE ADULT - NSPROSPHOSPCHAPLAINYN_GEN_A_NUR
Is the patient currently in the state of [place]? YES    Current patient location:  [address] [place] [address]  [place] [address]    Visit mode:VIDEO    If the visit is dropped, the patient can be reconnected by: VIDEO VISIT: Text to cell phone:   Telephone Information:   Mobile [phone]       Will anyone else be joining the visit? No  (If patient encounters technical issues they should call [phone])    How would you like to obtain your AVS? MyChart    Are changes needed to the allergy or medication list? No    Are refills needed on medications prescribed by this physician? NO    Rooming Documentation: Assigned questionnaire(s) completed .    Reason for visit: RECHSINCERE Wong         no

## 2025-05-15 ENCOUNTER — INPATIENT (INPATIENT)
Facility: HOSPITAL | Age: 89
LOS: 7 days | Discharge: NURSING, REHAB OR LONG-TERM CARE | DRG: 195 | End: 2025-05-23
Attending: INTERNAL MEDICINE | Admitting: INTERNAL MEDICINE
Payer: MEDICARE

## 2025-05-15 VITALS
WEIGHT: 154.1 LBS | TEMPERATURE: 98 F | SYSTOLIC BLOOD PRESSURE: 112 MMHG | HEIGHT: 64 IN | OXYGEN SATURATION: 96 % | DIASTOLIC BLOOD PRESSURE: 72 MMHG | HEART RATE: 116 BPM | RESPIRATION RATE: 22 BRPM

## 2025-05-15 DIAGNOSIS — J18.9 PNEUMONIA, UNSPECIFIED ORGANISM: ICD-10-CM

## 2025-05-15 LAB
ALBUMIN SERPL ELPH-MCNC: 3.8 G/DL — SIGNIFICANT CHANGE UP (ref 3.3–5)
ALP SERPL-CCNC: 118 U/L — SIGNIFICANT CHANGE UP (ref 40–120)
ALT FLD-CCNC: 11 U/L — SIGNIFICANT CHANGE UP (ref 10–45)
ANION GAP SERPL CALC-SCNC: 13 MMOL/L — SIGNIFICANT CHANGE UP (ref 5–17)
APTT BLD: 26.1 SEC — SIGNIFICANT CHANGE UP (ref 26.1–36.8)
AST SERPL-CCNC: 18 U/L — SIGNIFICANT CHANGE UP (ref 10–40)
BASOPHILS # BLD AUTO: 0.04 K/UL — SIGNIFICANT CHANGE UP (ref 0–0.2)
BASOPHILS NFR BLD AUTO: 0.3 % — SIGNIFICANT CHANGE UP (ref 0–2)
BILIRUB SERPL-MCNC: 0.1 MG/DL — LOW (ref 0.2–1.2)
BUN SERPL-MCNC: 17 MG/DL — SIGNIFICANT CHANGE UP (ref 7–23)
CALCIUM SERPL-MCNC: 9.4 MG/DL — SIGNIFICANT CHANGE UP (ref 8.4–10.5)
CHLORIDE SERPL-SCNC: 99 MMOL/L — SIGNIFICANT CHANGE UP (ref 96–108)
CO2 SERPL-SCNC: 26 MMOL/L — SIGNIFICANT CHANGE UP (ref 22–31)
CREAT SERPL-MCNC: 1.15 MG/DL — SIGNIFICANT CHANGE UP (ref 0.5–1.3)
EGFR: 46 ML/MIN/1.73M2 — LOW
EGFR: 46 ML/MIN/1.73M2 — LOW
EOSINOPHIL # BLD AUTO: 0.03 K/UL — SIGNIFICANT CHANGE UP (ref 0–0.5)
EOSINOPHIL NFR BLD AUTO: 0.2 % — SIGNIFICANT CHANGE UP (ref 0–6)
FLUAV AG NPH QL: SIGNIFICANT CHANGE UP
FLUBV AG NPH QL: SIGNIFICANT CHANGE UP
GAS PNL BLDV: SIGNIFICANT CHANGE UP
GLUCOSE SERPL-MCNC: 133 MG/DL — HIGH (ref 70–99)
HCT VFR BLD CALC: 31.2 % — LOW (ref 34.5–45)
HGB BLD-MCNC: 9.6 G/DL — LOW (ref 11.5–15.5)
IMM GRANULOCYTES NFR BLD AUTO: 0.7 % — SIGNIFICANT CHANGE UP (ref 0–0.9)
INR BLD: 1.01 RATIO — SIGNIFICANT CHANGE UP (ref 0.85–1.16)
LYMPHOCYTES # BLD AUTO: 1.97 K/UL — SIGNIFICANT CHANGE UP (ref 1–3.3)
LYMPHOCYTES # BLD AUTO: 16.1 % — SIGNIFICANT CHANGE UP (ref 13–44)
MCHC RBC-ENTMCNC: 27.2 PG — SIGNIFICANT CHANGE UP (ref 27–34)
MCHC RBC-ENTMCNC: 30.8 G/DL — LOW (ref 32–36)
MCV RBC AUTO: 88.4 FL — SIGNIFICANT CHANGE UP (ref 80–100)
MONOCYTES # BLD AUTO: 1.38 K/UL — HIGH (ref 0–0.9)
MONOCYTES NFR BLD AUTO: 11.3 % — SIGNIFICANT CHANGE UP (ref 2–14)
NEUTROPHILS # BLD AUTO: 8.71 K/UL — HIGH (ref 1.8–7.4)
NEUTROPHILS NFR BLD AUTO: 71.4 % — SIGNIFICANT CHANGE UP (ref 43–77)
NRBC BLD AUTO-RTO: 0 /100 WBCS — SIGNIFICANT CHANGE UP (ref 0–0)
PLATELET # BLD AUTO: 378 K/UL — SIGNIFICANT CHANGE UP (ref 150–400)
POTASSIUM SERPL-MCNC: 4.6 MMOL/L — SIGNIFICANT CHANGE UP (ref 3.5–5.3)
POTASSIUM SERPL-SCNC: 4.6 MMOL/L — SIGNIFICANT CHANGE UP (ref 3.5–5.3)
PROT SERPL-MCNC: 7.1 G/DL — SIGNIFICANT CHANGE UP (ref 6–8.3)
PROTHROM AB SERPL-ACNC: 11.6 SEC — SIGNIFICANT CHANGE UP (ref 9.9–13.4)
RBC # BLD: 3.53 M/UL — LOW (ref 3.8–5.2)
RBC # FLD: 15.3 % — HIGH (ref 10.3–14.5)
RSV RNA NPH QL NAA+NON-PROBE: SIGNIFICANT CHANGE UP
SARS-COV-2 RNA SPEC QL NAA+PROBE: SIGNIFICANT CHANGE UP
SODIUM SERPL-SCNC: 138 MMOL/L — SIGNIFICANT CHANGE UP (ref 135–145)
SOURCE RESPIRATORY: SIGNIFICANT CHANGE UP
WBC # BLD: 12.21 K/UL — HIGH (ref 3.8–10.5)
WBC # FLD AUTO: 12.21 K/UL — HIGH (ref 3.8–10.5)

## 2025-05-15 PROCEDURE — 99285 EMERGENCY DEPT VISIT HI MDM: CPT

## 2025-05-15 PROCEDURE — 71250 CT THORAX DX C-: CPT | Mod: 26

## 2025-05-15 PROCEDURE — 93010 ELECTROCARDIOGRAM REPORT: CPT

## 2025-05-15 PROCEDURE — 71045 X-RAY EXAM CHEST 1 VIEW: CPT | Mod: 26

## 2025-05-15 RX ORDER — AZITHROMYCIN 250 MG
500 CAPSULE ORAL EVERY 24 HOURS
Refills: 0 | Status: COMPLETED | OUTPATIENT
Start: 2025-05-15 | End: 2025-05-20

## 2025-05-15 RX ORDER — ALBUTEROL SULFATE 2.5 MG/3ML
2 VIAL, NEBULIZER (ML) INHALATION EVERY 8 HOURS
Refills: 0 | Status: DISCONTINUED | OUTPATIENT
Start: 2025-05-15 | End: 2025-05-23

## 2025-05-15 RX ORDER — HEPARIN SODIUM 1000 [USP'U]/ML
5000 INJECTION INTRAVENOUS; SUBCUTANEOUS EVERY 12 HOURS
Refills: 0 | Status: DISCONTINUED | OUTPATIENT
Start: 2025-05-15 | End: 2025-05-23

## 2025-05-15 RX ORDER — LOSARTAN POTASSIUM 100 MG/1
25 TABLET, FILM COATED ORAL DAILY
Refills: 0 | Status: DISCONTINUED | OUTPATIENT
Start: 2025-05-15 | End: 2025-05-23

## 2025-05-15 RX ORDER — CEFTRIAXONE 500 MG/1
1000 INJECTION, POWDER, FOR SOLUTION INTRAMUSCULAR; INTRAVENOUS ONCE
Refills: 0 | Status: COMPLETED | OUTPATIENT
Start: 2025-05-15 | End: 2025-05-15

## 2025-05-15 RX ORDER — IPRATROPIUM BROMIDE AND ALBUTEROL SULFATE .5; 2.5 MG/3ML; MG/3ML
3 SOLUTION RESPIRATORY (INHALATION)
Refills: 0 | Status: COMPLETED | OUTPATIENT
Start: 2025-05-15 | End: 2025-05-15

## 2025-05-15 RX ORDER — AZITHROMYCIN 250 MG
500 CAPSULE ORAL ONCE
Refills: 0 | Status: COMPLETED | OUTPATIENT
Start: 2025-05-15 | End: 2025-05-15

## 2025-05-15 RX ORDER — METHYLPREDNISOLONE ACETATE 80 MG/ML
20 INJECTION, SUSPENSION INTRA-ARTICULAR; INTRALESIONAL; INTRAMUSCULAR; SOFT TISSUE EVERY 8 HOURS
Refills: 0 | Status: DISCONTINUED | OUTPATIENT
Start: 2025-05-15 | End: 2025-05-17

## 2025-05-15 RX ORDER — SODIUM CHLORIDE 9 G/1000ML
1000 INJECTION, SOLUTION INTRAVENOUS ONCE
Refills: 0 | Status: COMPLETED | OUTPATIENT
Start: 2025-05-15 | End: 2025-05-15

## 2025-05-15 RX ORDER — CEFTRIAXONE 500 MG/1
1000 INJECTION, POWDER, FOR SOLUTION INTRAMUSCULAR; INTRAVENOUS EVERY 24 HOURS
Refills: 0 | Status: COMPLETED | OUTPATIENT
Start: 2025-05-15 | End: 2025-05-20

## 2025-05-15 RX ORDER — IPRATROPIUM BROMIDE AND ALBUTEROL SULFATE .5; 2.5 MG/3ML; MG/3ML
3 SOLUTION RESPIRATORY (INHALATION) EVERY 6 HOURS
Refills: 0 | Status: DISCONTINUED | OUTPATIENT
Start: 2025-05-15 | End: 2025-05-23

## 2025-05-15 RX ADMIN — SODIUM CHLORIDE 1000 MILLILITER(S): 9 INJECTION, SOLUTION INTRAVENOUS at 20:57

## 2025-05-15 RX ADMIN — METHYLPREDNISOLONE ACETATE 20 MILLIGRAM(S): 80 INJECTION, SUSPENSION INTRA-ARTICULAR; INTRALESIONAL; INTRAMUSCULAR; SOFT TISSUE at 22:24

## 2025-05-15 RX ADMIN — Medication 250 MILLIGRAM(S): at 21:44

## 2025-05-15 RX ADMIN — IPRATROPIUM BROMIDE AND ALBUTEROL SULFATE 3 MILLILITER(S): .5; 2.5 SOLUTION RESPIRATORY (INHALATION) at 23:21

## 2025-05-15 RX ADMIN — CEFTRIAXONE 100 MILLIGRAM(S): 500 INJECTION, POWDER, FOR SOLUTION INTRAMUSCULAR; INTRAVENOUS at 21:03

## 2025-05-15 RX ADMIN — IPRATROPIUM BROMIDE AND ALBUTEROL SULFATE 3 MILLILITER(S): .5; 2.5 SOLUTION RESPIRATORY (INHALATION) at 19:32

## 2025-05-15 RX ADMIN — IPRATROPIUM BROMIDE AND ALBUTEROL SULFATE 3 MILLILITER(S): .5; 2.5 SOLUTION RESPIRATORY (INHALATION) at 21:45

## 2025-05-15 RX ADMIN — IPRATROPIUM BROMIDE AND ALBUTEROL SULFATE 3 MILLILITER(S): .5; 2.5 SOLUTION RESPIRATORY (INHALATION) at 20:58

## 2025-05-15 NOTE — ED ADULT NURSE NOTE - OBJECTIVE STATEMENT
89y F A&Ox4 c/o difficulty breathing. ems states pt is at [facility] uses wheelchair at baseline. pt states has been having difficulty breathing for last couple of days. ems states pts O2 sat was 88% on RA and was placed on 2l NC. upon assessment pt is well appearing, PMS intact, moves all extremeties, No signs of acute distress, Breathing tachpypneic and wheezimg noted. pt denies: HA, Fever, chills, CP, N/V/D GI/Gu symptoms.

## 2025-05-15 NOTE — ED PROVIDER NOTE - OBJECTIVE STATEMENT
89-year-old female no PMH nonambulatory presenting with difficulty breathing  Patient was advised to come in from [facility] patient was found satting at 89 and wheezing.  Patient denies symptoms of shortness of breath palpitation chest pain weakness dizziness URI symptoms.  Patient denies history of asthma or any other pathology.  Patient denies dysuria increased urinary frequency.

## 2025-05-15 NOTE — H&P ADULT - HISTORY OF PRESENT ILLNESS
Date of Service, 05-15-25 @ 21:04  CHIEF COMPLAINT:Patient is a 89y old  Female who presents with a chief complaint of     HPI:  89-year-old female no PMH nonambulatory presenting with difficulty breathing  [facility] at 89 and wheezing.  Patient denies symptoms of shortness of breath palpitation chest pain weakness dizziness URI symptoms.  Patient denies history of asthma or any other pathology.  Patient denies dysuria increased urinary frequency.    PAST MEDICAL & SURGICAL HISTORY:  HTN  COPD      No significant past surgical history      MEDICATIONS  (STANDING):  albuterol/ipratropium for Nebulization 3 milliLiter(s) Nebulizer every 30 minutes  azithromycin  IVPB 500 milliGRAM(s) IV Intermittent once    MEDICATIONS  Home  	predniSONE 10 mg oral tablet: 1 tab(s) orally once a day  · 	predniSONE 10 mg oral tablet: 2 tab(s) orally once a day Starting 5/16, take 20 mg for 3 days  	Then on 5/19, take 10 mg for 3 days  · 	ProAir HFA 90 mcg/inh inhalation aerosol: 2 puff(s) inhaled every 4 hours as needed for  bronchospasm  · 	ipratropium-albuterol 0.5 mg-2.5 mg/3 mL inhalation solution: 3 milliliter(s) by nebulizer every 6 hours  · 	Symbicort 160 mcg-4.5 mcg/inh inhalation aerosol: 2 puff(s) inhaled 2 times a day  · 	Nebulizer machine: Please use as directed  · 	losartan 50 mg oral tablet: 1 tab(s) orally once a day  · 	ascorbic acid 500 mg oral tablet: 1 tab(s) orally once a day  · 	multivitamin:     FAMILY HISTORY:      SOCIAL HISTORY:    x[ ] Non-smoker  [ ] Smoker  [ ] Alcohol    Allergies    No Known Allergies    Intolerances    	    REVIEW OF SYSTEMS:  CONSTITUTIONAL: No fever, weight loss, or fatigue  EYES: No eye pain, visual disturbances, or discharge  ENT:  No difficulty hearing, tinnitus, vertigo; No sinus or throat pain  NECK: No pain or stiffness  RESPIRATORY: No cough, wheezing, chills or hemoptysis; + Shortness of Breath  CARDIOVASCULAR: No chest pain, palpitations, passing out, dizziness, or leg swelling  GASTROINTESTINAL: No abdominal or epigastric pain. No nausea, vomiting, or hematemesis; No diarrhea or constipation. No melena or hematochezia.  GENITOURINARY: No dysuria, frequency, hematuria, or incontinence  NEUROLOGICAL: No headaches, memory loss, loss of strength, numbness, or tremors  SKIN: No itching, burning, rashes, or lesions   LYMPH Nodes: No enlarged glands  ENDOCRINE: No heat or cold intolerance; No hair loss  MUSCULOSKELETAL: No joint pain or swelling; No muscle, back, or extremity pain  PSYCHIATRIC: No depression, anxiety, mood swings, or difficulty sleeping  HEME/LYMPH: No easy bruising, or bleeding gums  ALLERGY AND IMMUNOLOGIC: No hives or eczema	    [ x] All others negative	  [ ] Unable to obtain    PHYSICAL EXAM:  T(C): 36.9 (05-15-25 @ 18:26), Max: 36.9 (05-15-25 @ 18:26)  HR: 116 (05-15-25 @ 18:26) (116 - 116)  BP: 112/72 (05-15-25 @ 18:26) (112/72 - 112/72)  RR: 22 (05-15-25 @ 18:26) (22 - 22)  SpO2: 96% (05-15-25 @ 18:26) (96% - 96%)  Wt(kg): --  I&O's Summary      Appearance: Normal	  HEENT:   Normal oral mucosa, PERRL, EOMI	  Lymphatic: No lymphadenopathy  Cardiovascular: Normal S1 S2, No JVD, + murmurs, No edema  Respiratory: Lungs clear to auscultation	  Gastrointestinal:  Soft, Non-tender, + BS	  Skin: No rashes, No ecchymoses, No cyanosis	  Neurologic: Non-focal  Extremities:  No clubbing, cyanosis or edema  Vascular: Peripheral pulses palpable 2+ bilaterally    TELEMETRY: 	    ECG:  	  RADIOLOGY:  OTHER: 	  	  LABS:	 	    CARDIAC MARKERS:                              9.6    12.21 )-----------( 378      ( 15 May 2025 19:32 )             31.2     05-15    138  |  99  |  17  ----------------------------<  133[H]  4.6   |  26  |  1.15    Ca    9.4      15 May 2025 19:32    TPro  7.1  /  Alb  3.8  /  TBili  0.1[L]  /  DBili  x   /  AST  18  /  ALT  11  /  AlkPhos  118  05-15    proBNP:   Lipid Profile:   HgA1c:   TSH:   PT/INR - ( 15 May 2025 19:32 )   PT: 11.6 sec;   INR: 1.01 ratio         PTT - ( 15 May 2025 19:32 )  PTT:26.1 sec    PREVIOUS DIAGNOSTIC TESTING:      < from: 12 Lead ECG (05.06.24 @ 20:31) >    Diagnosis Line SINUS TACHYCARDIA  NONSPECIFIC ST AND T WAVE ABNORMALITY  ABNORMAL ECG  WHEN COMPARED WITH ECG OF 27-DEC-2021 16:04,  NO SIGNIFICANT CHANGE WAS FOUND      < from: TTE W or WO Ultrasound Enhancing Agent (05.07.24 @ 10:14) >   1. Left ventricular wall thickness is normal.   2. The right ventricle is not well visualized.   3. Aortic valve was not well visualized.   4. No pericardial effusion seen.   5. Severely limited images.Patient would not complete exam.   6. Left ventricular endocardium is not well visualized; however, the left ventricular systolic function appears grossly normal.    < from: Xray Chest 1 View- PORTABLE-Urgent (05.15.25 @ 20:32) >  Cardiac/mediastinum/hilum: Heart size cannot be accurately assessed on   this projection.    Lung parenchyma/Pleura: The lungs are clear. There is no pleural   effusion. There is no pneumothorax.    Skeleton/soft tissues: Chronic deformities of the bilateral glenohumeral   joints. No acute osseous abnormalities.    IMPRESSION:    No focal consolidation.

## 2025-05-15 NOTE — H&P ADULT - NSHPLABSRESULTS_GEN_ALL_CORE
< from: CT Abdomen and Pelvis w/ IV Cont (05.08.24 @ 20:09) >    Exophytic 4.9 cm bilobed mass along the lesser curvature of the stomach,   increased in size since 2021. 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    Right lower lobe consolidation, likely pneumonia. Right hilar adenopathy.    >

## 2025-05-15 NOTE — CONSULT NOTE ADULT - ASSESSMENT
89-year-old female      no PMH nonambulatory /  h/o  gallstones./  had  pna  2024     presenting with difficulty breathing/   was  sent   from [facility] patient ith sob/  sat  oft 89 and wheezing.       denies  palpitation chest pain weakness dizziness URI symptoms.   denies history of asthma. dysuria increased urinary frequency.       sob/  hypoxia.       wheezing /  rvp, negative     pt  is   afebrile  wbc  12,000       cxr. normal    ct chest.  r/o  pna     iv steroid.   nebs. 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 Left ventricular wall thickness is normal.   2. The right ventricle is not well visualized.   3. Aortic valve was not well visualized.   4. No pericardial effusion seen.   5. Severely limited images.Patient would not complete exam.   6. Left ventricular endocardium is not well visualized; however, the left ventricular systolic function appears grossly normal.  ________________________________________________________________________________________  FINDINGS:          rad< from: CT Abdomen and Pelvis w/ IV Cont (05.08.24 @ 20:09) >  IMPRESSION:  Exophytic 4.9 cm bilobed mass along the lesser curvature of the stomach,   increased in size since 2021. 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  Right lower lobe consolidation, likely pneumonia. Right hilar adenopathy  --- End of Report ---      <

## 2025-05-15 NOTE — PATIENT PROFILE ADULT - FALL HARM RISK - HARM RISK INTERVENTIONS

## 2025-05-15 NOTE — ED PROVIDER NOTE - ATTENDING CONTRIBUTION TO CARE
89-year-old female no PMH p/w Fever cough shortness of breath for last 3 to 4 days noted to be wheezing here satting in the upper 80s nebs sepsis workup rule out pneumonia IV antibiotics

## 2025-05-15 NOTE — ED PROVIDER NOTE - CLINICAL SUMMARY MEDICAL DECISION MAKING FREE TEXT BOX
Patient brought in satting 89% on room air patient failed trial off of O2 satting in the 80s.  Patient concerning for pneumonia and/or reactive airway disease will obtain basic labs treat with DuoNebs and antibiotics.  I will order basic labs coags blood cultures flu COVID RSV and urinalysis.

## 2025-05-15 NOTE — ED PROVIDER NOTE - PHYSICAL EXAMINATION
GENERAL: NAD, lying in bed comfortably  HEAD:  Atraumatic, normocephalic  HEART: Regular rate and rhythm, no murmurs, rubs, or gallops  LUNGS: labored respirations.  wheezing oscultated   ABDOMEN: Soft, nontender, nondistended, +BS  EXTREMITIES: 2+ peripheral pulses bilaterally. No clubbing, cyanosis, or edema  NERVOUS SYSTEM:  A&Ox3, moving all extremities, no focal deficits   SKIN: No rashes or lesions

## 2025-05-15 NOTE — ED ADULT NURSE NOTE - NSFALLHARMRISKINTERV_ED_ALL_ED

## 2025-05-15 NOTE — H&P ADULT - ASSESSMENT
89-year-old female no PMH nonambulatory presenting with difficulty breathing  Patient was advised to come in from [facility] patient was found satting at 89 and wheezing.  Patient denies symptoms of shortness of breath palpitation chest pain weakness dizziness URI symptoms.  Patient denies history of asthma or any other pathology.  Patient denies dysuria increased urinary frequency.  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

## 2025-05-16 LAB
ANION GAP SERPL CALC-SCNC: 10 MMOL/L — SIGNIFICANT CHANGE UP (ref 5–17)
APPEARANCE UR: CLEAR — SIGNIFICANT CHANGE UP
BILIRUB UR-MCNC: NEGATIVE — SIGNIFICANT CHANGE UP
BUN SERPL-MCNC: 15 MG/DL — SIGNIFICANT CHANGE UP (ref 7–23)
CALCIUM SERPL-MCNC: 9.1 MG/DL — SIGNIFICANT CHANGE UP (ref 8.4–10.5)
CHLORIDE SERPL-SCNC: 100 MMOL/L — SIGNIFICANT CHANGE UP (ref 96–108)
CO2 SERPL-SCNC: 28 MMOL/L — SIGNIFICANT CHANGE UP (ref 22–31)
COLOR SPEC: YELLOW — SIGNIFICANT CHANGE UP
CREAT SERPL-MCNC: 1.02 MG/DL — SIGNIFICANT CHANGE UP (ref 0.5–1.3)
DIFF PNL FLD: NEGATIVE — SIGNIFICANT CHANGE UP
EGFR: 53 ML/MIN/1.73M2 — LOW
EGFR: 53 ML/MIN/1.73M2 — LOW
GLUCOSE SERPL-MCNC: 131 MG/DL — HIGH (ref 70–99)
GLUCOSE UR QL: NEGATIVE MG/DL — SIGNIFICANT CHANGE UP
HCT VFR BLD CALC: 31.5 % — LOW (ref 34.5–45)
HGB BLD-MCNC: 9.2 G/DL — LOW (ref 11.5–15.5)
KETONES UR QL: NEGATIVE MG/DL — SIGNIFICANT CHANGE UP
LEUKOCYTE ESTERASE UR-ACNC: NEGATIVE — SIGNIFICANT CHANGE UP
MCHC RBC-ENTMCNC: 26.3 PG — LOW (ref 27–34)
MCHC RBC-ENTMCNC: 29.2 G/DL — LOW (ref 32–36)
MCV RBC AUTO: 90 FL — SIGNIFICANT CHANGE UP (ref 80–100)
NITRITE UR-MCNC: NEGATIVE — SIGNIFICANT CHANGE UP
NRBC BLD AUTO-RTO: 0 /100 WBCS — SIGNIFICANT CHANGE UP (ref 0–0)
PH UR: 7 — SIGNIFICANT CHANGE UP (ref 5–8)
PLATELET # BLD AUTO: 340 K/UL — SIGNIFICANT CHANGE UP (ref 150–400)
POTASSIUM SERPL-MCNC: 5 MMOL/L — SIGNIFICANT CHANGE UP (ref 3.5–5.3)
POTASSIUM SERPL-SCNC: 5 MMOL/L — SIGNIFICANT CHANGE UP (ref 3.5–5.3)
PROT UR-MCNC: NEGATIVE MG/DL — SIGNIFICANT CHANGE UP
RBC # BLD: 3.5 M/UL — LOW (ref 3.8–5.2)
RBC # FLD: 15.5 % — HIGH (ref 10.3–14.5)
SODIUM SERPL-SCNC: 138 MMOL/L — SIGNIFICANT CHANGE UP (ref 135–145)
SP GR SPEC: 1.01 — SIGNIFICANT CHANGE UP (ref 1–1.03)
UROBILINOGEN FLD QL: 0.2 MG/DL — SIGNIFICANT CHANGE UP (ref 0.2–1)
WBC # BLD: 9.99 K/UL — SIGNIFICANT CHANGE UP (ref 3.8–10.5)
WBC # FLD AUTO: 9.99 K/UL — SIGNIFICANT CHANGE UP (ref 3.8–10.5)

## 2025-05-16 RX ORDER — DEXTROMETHORPHAN HBR, GUAIFENESIN 200 MG/10ML
600 LIQUID ORAL EVERY 12 HOURS
Refills: 0 | Status: DISCONTINUED | OUTPATIENT
Start: 2025-05-16 | End: 2025-05-23

## 2025-05-16 RX ADMIN — IPRATROPIUM BROMIDE AND ALBUTEROL SULFATE 3 MILLILITER(S): .5; 2.5 SOLUTION RESPIRATORY (INHALATION) at 05:58

## 2025-05-16 RX ADMIN — CEFTRIAXONE 100 MILLIGRAM(S): 500 INJECTION, POWDER, FOR SOLUTION INTRAMUSCULAR; INTRAVENOUS at 20:21

## 2025-05-16 RX ADMIN — METHYLPREDNISOLONE ACETATE 20 MILLIGRAM(S): 80 INJECTION, SUSPENSION INTRA-ARTICULAR; INTRALESIONAL; INTRAMUSCULAR; SOFT TISSUE at 05:58

## 2025-05-16 RX ADMIN — METHYLPREDNISOLONE ACETATE 20 MILLIGRAM(S): 80 INJECTION, SUSPENSION INTRA-ARTICULAR; INTRALESIONAL; INTRAMUSCULAR; SOFT TISSUE at 21:16

## 2025-05-16 RX ADMIN — METHYLPREDNISOLONE ACETATE 20 MILLIGRAM(S): 80 INJECTION, SUSPENSION INTRA-ARTICULAR; INTRALESIONAL; INTRAMUSCULAR; SOFT TISSUE at 14:02

## 2025-05-16 RX ADMIN — LOSARTAN POTASSIUM 25 MILLIGRAM(S): 100 TABLET, FILM COATED ORAL at 05:58

## 2025-05-16 RX ADMIN — IPRATROPIUM BROMIDE AND ALBUTEROL SULFATE 3 MILLILITER(S): .5; 2.5 SOLUTION RESPIRATORY (INHALATION) at 11:41

## 2025-05-16 RX ADMIN — Medication 250 MILLIGRAM(S): at 21:16

## 2025-05-16 RX ADMIN — HEPARIN SODIUM 5000 UNIT(S): 1000 INJECTION INTRAVENOUS; SUBCUTANEOUS at 05:58

## 2025-05-16 NOTE — CONSULT NOTE ADULT - SUBJECTIVE AND OBJECTIVE BOX
---___---___---___---___---___---___ ---___---___---___---___---___---___---___---___---                  M E D I C A L   A T T E N D I N G    C O N S U L T A T I O N   N O T E  ---___---___---___---___---___---___ ---___---___---___---___---___---___---___---___---       ================================================    ++CHIEF COMPLAINT:   Patient is a 89y old  Female who presents with a chief complaint of sob/ tachycardia (16 May 2025 09:15)      ++HPI:  HPI:  Date of Service, 05-15-25 @ 21:04  CHIEF COMPLAINT:Patient is a 89y old  Female who presents with a chief complaint of     HPI:  89-year-old female no PMH nonambulatory presenting with difficulty breathing  Patient was advised to come in from [facility] patient was found satting at 89 and wheezing.  Patient denies symptoms of shortness of breath palpitation chest pain weakness dizziness URI symptoms.  Patient denies history of asthma or any other pathology.  Patient denies dysuria increased urinary frequency.    PAST MEDICAL & SURGICAL HISTORY:  HTN  COPD      No significant past surgical history      MEDICATIONS  (STANDING):  albuterol/ipratropium for Nebulization 3 milliLiter(s) Nebulizer every 30 minutes  azithromycin  IVPB 500 milliGRAM(s) IV Intermittent once    MEDICATIONS  Home  	predniSONE 10 mg oral tablet: 1 tab(s) orally once a day  · 	predniSONE 10 mg oral tablet: 2 tab(s) orally once a day Starting , take 20 mg for 3 days  	Then on , take 10 mg for 3 days  · 	ProAir HFA 90 mcg/inh inhalation aerosol: 2 puff(s) inhaled every 4 hours as needed for  bronchospasm  · 	ipratropium-albuterol 0.5 mg-2.5 mg/3 mL inhalation solution: 3 milliliter(s) by nebulizer every 6 hours  · 	Symbicort 160 mcg-4.5 mcg/inh inhalation aerosol: 2 puff(s) inhaled 2 times a day  · 	Nebulizer machine: Please use as directed  · 	losartan 50 mg oral tablet: 1 tab(s) orally once a day  · 	ascorbic acid 500 mg oral tablet: 1 tab(s) orally once a day  · 	multivitamin:     FAMILY HISTORY:      SOCIAL HISTORY:    x[ ] Non-smoker  [ ] Smoker  [ ] Alcohol    Allergies    No Known Allergies    Intolerances    	    REVIEW OF SYSTEMS:  CONSTITUTIONAL: No fever, weight loss, or fatigue  EYES: No eye pain, visual disturbances, or discharge  ENT:  No difficulty hearing, tinnitus, vertigo; No sinus or throat pain  NECK: No pain or stiffness  RESPIRATORY: No cough, wheezing, chills or hemoptysis; + Shortness of Breath  CARDIOVASCULAR: No chest pain, palpitations, passing out, dizziness, or leg swelling  GASTROINTESTINAL: No abdominal or epigastric pain. No nausea, vomiting, or hematemesis; No diarrhea or constipation. No melena or hematochezia.  GENITOURINARY: No dysuria, frequency, hematuria, or incontinence  NEUROLOGICAL: No headaches, memory loss, loss of strength, numbness, or tremors  SKIN: No itching, burning, rashes, or lesions   LYMPH Nodes: No enlarged glands  ENDOCRINE: No heat or cold intolerance; No hair loss  MUSCULOSKELETAL: No joint pain or swelling; No muscle, back, or extremity pain  PSYCHIATRIC: No depression, anxiety, mood swings, or difficulty sleeping  HEME/LYMPH: No easy bruising, or bleeding gums  ALLERGY AND IMMUNOLOGIC: No hives or eczema	    [ x] All others negative	  [ ] Unable to obtain    PHYSICAL EXAM:  T(C): 36.9 (05-15-25 @ 18:26), Max: 36.9 (05-15-25 @ 18:26)  HR: 116 (05-15-25 @ 18:26) (116 - 116)  BP: 112/72 (05-15-25 @ 18:26) (112/72 - 112/72)  RR: 22 (05-15-25 @ 18:26) (22 - 22)  SpO2: 96% (05-15-25 @ 18:26) (96% - 96%)  Wt(kg): --  I&O's Summary      Appearance: Normal	  HEENT:   Normal oral mucosa, PERRL, EOMI	  Lymphatic: No lymphadenopathy  Cardiovascular: Normal S1 S2, No JVD, + murmurs, No edema  Respiratory: Lungs clear to auscultation	  Gastrointestinal:  Soft, Non-tender, + BS	  Skin: No rashes, No ecchymoses, No cyanosis	  Neurologic: Non-focal  Extremities:  No clubbing, cyanosis or edema  Vascular: Peripheral pulses palpable 2+ bilaterally    TELEMETRY: 	    ECG:  	  RADIOLOGY:  OTHER: 	  	  LABS:	 	    CARDIAC MARKERS:                              9.6    12.21 )-----------( 378      ( 15 May 2025 19:32 )             31.2     05-15    138  |  99  |  17  ----------------------------<  133[H]  4.6   |  26  |  1.15    Ca    9.4      15 May 2025 19:32    TPro  7.1  /  Alb  3.8  /  TBili  0.1[L]  /  DBili  x   /  AST  18  /  ALT  11  /  AlkPhos  118  05-15    proBNP:   Lipid Profile:   HgA1c:   TSH:   PT/INR - ( 15 May 2025 19:32 )   PT: 11.6 sec;   INR: 1.01 ratio         PTT - ( 15 May 2025 19:32 )  PTT:26.1 sec    PREVIOUS DIAGNOSTIC TESTING:      < from: 12 Lead ECG (24 @ 20:31) >    Diagnosis Line SINUS TACHYCARDIA  NONSPECIFIC ST AND T WAVE ABNORMALITY  ABNORMAL ECG  WHEN COMPARED WITH ECG OF 27-DEC-2021 16:04,  NO SIGNIFICANT CHANGE WAS FOUND      < from: TTE W or WO Ultrasound Enhancing Agent (24 @ 10:14) >   1. Left ventricular wall thickness is normal.   2. The right ventricle is not well visualized.   3. Aortic valve was not well visualized.   4. No pericardial effusion seen.   5. Severely limited images.Patient would not complete exam.   6. Left ventricular endocardium is not well visualized; however, the left ventricular systolic function appears grossly normal.    < from: Xray Chest 1 View- PORTABLE-Urgent (05.15.25 @ 20:32) >  Cardiac/mediastinum/hilum: Heart size cannot be accurately assessed on   this projection.    Lung parenchyma/Pleura: The lungs are clear. There is no pleural   effusion. There is no pneumothorax.    Skeleton/soft tissues: Chronic deformities of the bilateral glenohumeral   joints. No acute osseous abnormalities.    IMPRESSION:    No focal consolidation.             (15 May 2025 21:03)      ---___---___---___---___---___---  PAST MEDICAL / Surgical  HISTORY:  PAST MEDICAL & SURGICAL HISTORY:  No pertinent past medical history      No significant past surgical history          ---___---___---___---___---___---  FAMILY HISTORY:   FAMILY HISTORY:      ---___---___---___---___---___---  SOCIAL HISTORY:  Alcohol: None  Smoking: None    ---___---___---___---___---___---  ALLERGIES:   Allergies    No Known Allergies    Intolerances        ---___---___---___---___---___---  MEDICATIONS:  MEDICATIONS  (STANDING):  albuterol    90 MICROgram(s) HFA Inhaler 2 Puff(s) Inhalation every 8 hours  albuterol/ipratropium for Nebulization 3 milliLiter(s) Nebulizer every 6 hours  azithromycin  IVPB 500 milliGRAM(s) IV Intermittent every 24 hours  cefTRIAXone   IVPB 1000 milliGRAM(s) IV Intermittent every 24 hours  guaiFENesin  milliGRAM(s) Oral every 12 hours  heparin   Injectable 5000 Unit(s) SubCutaneous every 12 hours  losartan 25 milliGRAM(s) Oral daily  methylPREDNISolone sodium succinate Injectable 20 milliGRAM(s) IV Push every 8 hours    MEDICATIONS  (PRN):      ---___---___---___---___---___---  REVIEW OF SYSTEM:    GEN: no fever, no chills, no pain  RESP: no SOB, no cough, no sputum  CVS: no chest pain, no palpitations, no edema  GI: no abdominal pain, no nausea, no vomiting, no constipation, no diarrhea  : no dysurea, no frequency, no hematurea  Neuro: no headache, no dizziness  PSYCH: no anxiety, no depression  Derm : no itching, no rash    ---___---___---___---___---___---  VITAL SIGNS:  89y , CAPILLARY BLOOD GLUCOSE          ---___---___---___---___---___---  PHYSICAL EXAM:    GEN: A&O X 2 , NAD , comfortable  HEENT: NCAT, PERRL, MMM, hearing intact  Neck: supple , no JVD  CVS: S1S2 , regular , No M/R/G appreciated  PULM: CTA B/L,  no W/R/R appreciated wheezing noted   ABD.: soft. non tender, non distended,  bowel sounds present  Extrem: intact pulses , no edema   Derm: No rash , no ecchymoses  PSYCH : normal mood,  no delusion not anxious     ---___---___---___---___---___---            LAB AND IMAGIN.2    9.99  )-----------( 340      ( 16 May 2025 07:02 )             31.5               05-16    138  |  100  |  15  ----------------------------<  131[H]  5.0   |  28  |  1.02    Ca    9.1      16 May 2025 07:06    TPro  7.1  /  Alb  3.8  /  TBili  0.1[L]  /  DBili  x   /  AST  18  /  ALT  11  /  AlkPhos  118  05-15    PT/INR - ( 15 May 2025 19:32 )   PT: 11.6 sec;   INR: 1.01 ratio         PTT - ( 15 May 2025 19:32 )  PTT:26.1 sec                        Urinalysis Basic - ( 16 May 2025 11:53 )    Color: Yellow / Appearance: Clear / S.009 / pH: x  Gluc: x / Ketone: x  / Bili: Negative / Urobili: 0.2 mg/dL   Blood: x / Protein: Negative mg/dL / Nitrite: Negative   Leuk Esterase: Negative / RBC: x / WBC x   Sq Epi: x / Non Sq Epi: x / Bacteria: x        [All pertinent / recent Imaging reviewed]         ---___---___---___---___---___---___ ---___---___---___---___---                         A S S E S S M E N T   A N D   P L A N :      HEALTH ISSUES - PROBLEM Dx:    wheezing   copd exacerbation   continue steroids  and nebulizers  and abx   htn on losartan       ___________________________  Will follow with you.  Thank you,  Kole Prieto  [phone]
   HPI:    Date of service: 05-15-25 @ 21:07    89-year-old female      no PMH nonambulatory      presenting with difficulty breathing  Patient was  sent   from [facility] patient [facility] sob/  sat  oft 89 and wheezing.       denies  palpitation chest pain weakness dizziness URI symptoms.   denies history of asthma. dysuria increased urinary frequency.    REVIEW OF SYSTEMS:  CONSTITUTIONAL: No fever,  no  weight loss  ENT:  No  tinnitus,   no   vertigo  NECK: No pain or stiffness  RESPIRATORY: No cough,  =  wheezing, chills or hemoptysis;   +  Shortness of Breath  CARDIOVASCULAR: No chest pain, palpitations, dizziness  GASTROINTESTINAL: No abdominal or epigastric pain. No nausea, vomiting, or hematemesis; No diarrhea  No melena or hematochezia.  GENITOURINARY: No dysuria, frequency, hematuria, or incontinence  NEUROLOGICAL: No headaches  SKIN: No itching,  no   rash  LYMPH Nodes: No enlarged glands  ENDOCRINE: No heat or cold intolerance  MUSCULOSKELETAL: No joint pain or swelling  PSYCHIATRIC: No depression, anxiety  HEME/LYMPH: No easy bruising, or bleeding gums  ALLERGY AND IMMUNOLOGIC: No hives or eczema	    Allergies    No Known Allergies    Intolerances        CAPILLARY BLOOD GLUCOSE        I&O's Summary      Vital Signs Last 24 Hrs  T(C): 37 (15 May 2025 21:04), Max: 37 (15 May 2025 21:04)  T(F): 98.6 (15 May 2025 21:04), Max: 98.6 (15 May 2025 21:04)  HR: 88 (15 May 2025 21:04) (88 - 116)  BP: 144/86 (15 May 2025 21:04) (112/72 - 144/86)  BP(mean): --  RR: 24 (15 May 2025 21:04) (22 - 24)  SpO2: 98% (15 May 2025 21:04) (96% - 98%)    Parameters below as of 15 May 2025 21:04  Patient On (Oxygen Delivery Method): nasal cannula  O2 Flow (L/min): 4      PHYSICAL EXAM:  Appearance: Normal	  HEENT:   Normal oral mucosa, PERRL, EOMI	  Lymphatic: No lymphadenopathy  Cardiovascular: Normal S1 S2, No JVD  Respiratory: Lungs  wheezing  Gastrointestinal:  Soft, Non-tender, + BS	  Skin: No rash, No ecchymoses	  Extremities: Normal range of motion  Vascular: Peripheral pulses palpable bilaterally    MEDICATIONS  (STANDING):  albuterol/ipratropium for Nebulization 3 milliLiter(s) Nebulizer every 30 minutes  azithromycin  IVPB 500 milliGRAM(s) IV Intermittent once    MEDICATIONS  (PRN):    LABS:                        9.6    12.21 )-----------( 378      ( 15 May 2025 19:32 )             31.2     05-15    138  |  99  |  17  ----------------------------<  133[H]  4.6   |  26  |  1.15    Ca    9.4      15 May 2025 19:32    TPro  7.1  /  Alb  3.8  /  TBili  0.1[L]  /  DBili  x   /  AST  18  /  ALT  11  /  AlkPhos  118  05-15    PT/INR - ( 15 May 2025 19:32 )   PT: 11.6 sec;   INR: 1.01 ratio         PTT - ( 15 May 2025 19:32 )  PTT:26.1 sec      Urinalysis Basic - ( 15 May 2025 19:32 )    Color: x / Appearance: x / SG: x / pH: x  Gluc: 133 mg/dL / Ketone: x  / Bili: x / Urobili: x   Blood: x / Protein: x / Nitrite: x   Leuk Esterase: x / RBC: x / WBC x   Sq Epi: x / Non Sq Epi: x / Bacteria: x          05-15 @ 19:20  4.7  39              Consultant(s) Notes Reviewed:      Care Discussed with Consultants/Other Providers:  Plan:

## 2025-05-17 RX ORDER — BENZONATATE 100 MG
100 CAPSULE ORAL EVERY 8 HOURS
Refills: 0 | Status: DISCONTINUED | OUTPATIENT
Start: 2025-05-17 | End: 2025-05-23

## 2025-05-17 RX ORDER — METHYLPREDNISOLONE ACETATE 80 MG/ML
20 INJECTION, SUSPENSION INTRA-ARTICULAR; INTRALESIONAL; INTRAMUSCULAR; SOFT TISSUE
Refills: 0 | Status: DISCONTINUED | OUTPATIENT
Start: 2025-05-17 | End: 2025-05-19

## 2025-05-17 RX ADMIN — IPRATROPIUM BROMIDE AND ALBUTEROL SULFATE 3 MILLILITER(S): .5; 2.5 SOLUTION RESPIRATORY (INHALATION) at 00:08

## 2025-05-17 RX ADMIN — IPRATROPIUM BROMIDE AND ALBUTEROL SULFATE 3 MILLILITER(S): .5; 2.5 SOLUTION RESPIRATORY (INHALATION) at 05:34

## 2025-05-17 RX ADMIN — DEXTROMETHORPHAN HBR, GUAIFENESIN 600 MILLIGRAM(S): 200 LIQUID ORAL at 12:35

## 2025-05-17 RX ADMIN — Medication 100 MILLIGRAM(S): at 15:59

## 2025-05-17 RX ADMIN — IPRATROPIUM BROMIDE AND ALBUTEROL SULFATE 3 MILLILITER(S): .5; 2.5 SOLUTION RESPIRATORY (INHALATION) at 12:35

## 2025-05-17 RX ADMIN — IPRATROPIUM BROMIDE AND ALBUTEROL SULFATE 3 MILLILITER(S): .5; 2.5 SOLUTION RESPIRATORY (INHALATION) at 17:45

## 2025-05-17 RX ADMIN — HEPARIN SODIUM 5000 UNIT(S): 1000 INJECTION INTRAVENOUS; SUBCUTANEOUS at 17:45

## 2025-05-17 RX ADMIN — Medication 250 MILLIGRAM(S): at 23:03

## 2025-05-17 RX ADMIN — METHYLPREDNISOLONE ACETATE 20 MILLIGRAM(S): 80 INJECTION, SUSPENSION INTRA-ARTICULAR; INTRALESIONAL; INTRAMUSCULAR; SOFT TISSUE at 17:45

## 2025-05-17 RX ADMIN — CEFTRIAXONE 100 MILLIGRAM(S): 500 INJECTION, POWDER, FOR SOLUTION INTRAMUSCULAR; INTRAVENOUS at 21:07

## 2025-05-17 NOTE — PROVIDER CONTACT NOTE (MEDICATION) - SITUATION
Patient refusing albuterol, Mucinex, and sub q heparin
Patient refusing heparin, mucinex, and losartan.  Pt education provided.
patient refusing duo neb

## 2025-05-17 NOTE — PROGRESS NOTE ADULT - ASSESSMENT
89-year-old female      no PMH nonambulatory /  h/o  gallstones./  had  pna  2024     presenting with difficulty breathing/   was  sent   from [facility] patient ith sob/  sat  oft 89 and wheezing.       denies  palpitation chest pain weakness dizziness URI symptoms.   denies history of asthma. dysuria increased urinary frequency.       sob/  hypoxia.       wheezing /  rvp, negative     pt  is   afebrile  wbc  12,000       cxr. normal    ct chest.  r/o  pna     iv steroid.   nebs. 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 Left ventricular wall thickness is normal.   2. The right ventricle is not well visualized.   3. Aortic valve was not well visualized.   4. No pericardial effusion seen.   5. Severely limited images.Patient would not complete exam.   6. Left ventricular endocardium is not well visualized; however, the left ventricular systolic function appears grossly normal.  ________________________________________________________________________________________  FINDINGS:          rad< from: CT Abdomen and Pelvis w/ IV Cont (05.08.24 @ 20:09) >  IMPRESSION:  Exophytic 4.9 cm bilobed mass along the lesser curvature of the stomach,   increased in size since 2021. 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  Right lower lobe consolidation, likely pneumonia. Right hilar adenopathy  --- End of Report ---      <

## 2025-05-18 RX ADMIN — Medication 250 MILLIGRAM(S): at 21:01

## 2025-05-18 RX ADMIN — HEPARIN SODIUM 5000 UNIT(S): 1000 INJECTION INTRAVENOUS; SUBCUTANEOUS at 05:01

## 2025-05-18 RX ADMIN — IPRATROPIUM BROMIDE AND ALBUTEROL SULFATE 3 MILLILITER(S): .5; 2.5 SOLUTION RESPIRATORY (INHALATION) at 00:15

## 2025-05-18 RX ADMIN — METHYLPREDNISOLONE ACETATE 20 MILLIGRAM(S): 80 INJECTION, SUSPENSION INTRA-ARTICULAR; INTRALESIONAL; INTRAMUSCULAR; SOFT TISSUE at 17:12

## 2025-05-18 RX ADMIN — METHYLPREDNISOLONE ACETATE 20 MILLIGRAM(S): 80 INJECTION, SUSPENSION INTRA-ARTICULAR; INTRALESIONAL; INTRAMUSCULAR; SOFT TISSUE at 05:01

## 2025-05-18 RX ADMIN — IPRATROPIUM BROMIDE AND ALBUTEROL SULFATE 3 MILLILITER(S): .5; 2.5 SOLUTION RESPIRATORY (INHALATION) at 05:00

## 2025-05-18 RX ADMIN — HEPARIN SODIUM 5000 UNIT(S): 1000 INJECTION INTRAVENOUS; SUBCUTANEOUS at 17:12

## 2025-05-18 RX ADMIN — CEFTRIAXONE 100 MILLIGRAM(S): 500 INJECTION, POWDER, FOR SOLUTION INTRAMUSCULAR; INTRAVENOUS at 21:01

## 2025-05-18 RX ADMIN — IPRATROPIUM BROMIDE AND ALBUTEROL SULFATE 3 MILLILITER(S): .5; 2.5 SOLUTION RESPIRATORY (INHALATION) at 17:13

## 2025-05-18 RX ADMIN — Medication 100 MILLIGRAM(S): at 02:15

## 2025-05-18 RX ADMIN — Medication 100 MILLIGRAM(S): at 14:04

## 2025-05-18 RX ADMIN — LOSARTAN POTASSIUM 25 MILLIGRAM(S): 100 TABLET, FILM COATED ORAL at 05:01

## 2025-05-18 RX ADMIN — DEXTROMETHORPHAN HBR, GUAIFENESIN 600 MILLIGRAM(S): 200 LIQUID ORAL at 05:01

## 2025-05-18 RX ADMIN — Medication 100 MILLIGRAM(S): at 21:01

## 2025-05-18 RX ADMIN — IPRATROPIUM BROMIDE AND ALBUTEROL SULFATE 3 MILLILITER(S): .5; 2.5 SOLUTION RESPIRATORY (INHALATION) at 11:17

## 2025-05-18 RX ADMIN — DEXTROMETHORPHAN HBR, GUAIFENESIN 600 MILLIGRAM(S): 200 LIQUID ORAL at 17:12

## 2025-05-18 RX ADMIN — IPRATROPIUM BROMIDE AND ALBUTEROL SULFATE 3 MILLILITER(S): .5; 2.5 SOLUTION RESPIRATORY (INHALATION) at 23:50

## 2025-05-19 ENCOUNTER — TRANSCRIPTION ENCOUNTER (OUTPATIENT)
Age: 89
End: 2025-05-19

## 2025-05-19 RX ORDER — PREDNISONE 20 MG/1
40 TABLET ORAL DAILY
Refills: 0 | Status: DISCONTINUED | OUTPATIENT
Start: 2025-05-19 | End: 2025-05-23

## 2025-05-19 RX ADMIN — Medication 100 MILLIGRAM(S): at 06:03

## 2025-05-19 RX ADMIN — IPRATROPIUM BROMIDE AND ALBUTEROL SULFATE 3 MILLILITER(S): .5; 2.5 SOLUTION RESPIRATORY (INHALATION) at 18:09

## 2025-05-19 RX ADMIN — METHYLPREDNISOLONE ACETATE 20 MILLIGRAM(S): 80 INJECTION, SUSPENSION INTRA-ARTICULAR; INTRALESIONAL; INTRAMUSCULAR; SOFT TISSUE at 06:01

## 2025-05-19 RX ADMIN — IPRATROPIUM BROMIDE AND ALBUTEROL SULFATE 3 MILLILITER(S): .5; 2.5 SOLUTION RESPIRATORY (INHALATION) at 11:32

## 2025-05-19 RX ADMIN — CEFTRIAXONE 100 MILLIGRAM(S): 500 INJECTION, POWDER, FOR SOLUTION INTRAMUSCULAR; INTRAVENOUS at 21:28

## 2025-05-19 RX ADMIN — IPRATROPIUM BROMIDE AND ALBUTEROL SULFATE 3 MILLILITER(S): .5; 2.5 SOLUTION RESPIRATORY (INHALATION) at 06:03

## 2025-05-19 RX ADMIN — DEXTROMETHORPHAN HBR, GUAIFENESIN 600 MILLIGRAM(S): 200 LIQUID ORAL at 06:03

## 2025-05-19 RX ADMIN — Medication 100 MILLIGRAM(S): at 21:29

## 2025-05-19 RX ADMIN — DEXTROMETHORPHAN HBR, GUAIFENESIN 600 MILLIGRAM(S): 200 LIQUID ORAL at 18:10

## 2025-05-19 RX ADMIN — HEPARIN SODIUM 5000 UNIT(S): 1000 INJECTION INTRAVENOUS; SUBCUTANEOUS at 06:01

## 2025-05-19 RX ADMIN — Medication 250 MILLIGRAM(S): at 21:29

## 2025-05-19 RX ADMIN — HEPARIN SODIUM 5000 UNIT(S): 1000 INJECTION INTRAVENOUS; SUBCUTANEOUS at 18:10

## 2025-05-19 RX ADMIN — LOSARTAN POTASSIUM 25 MILLIGRAM(S): 100 TABLET, FILM COATED ORAL at 06:04

## 2025-05-19 NOTE — DISCHARGE NOTE PROVIDER - NSDCACTIVITY_GEN_ALL_CORE
[FreeTextEntry1] : Colonoscopy full risk and benefits explained\par  No restrictions No restrictions/Activity as tolerated

## 2025-05-19 NOTE — DISCHARGE NOTE PROVIDER - NSDCHHATTENDCERT_GEN_ALL_CORE
My signature below certifies that the above stated patient is homebound and upon completion of the Face-To-Face encounter, has the need for intermittent skilled nursing, physical therapy and/or speech or occupational therapy services in their home for their current diagnosis as outlined in their initial plan of care. These services will continue to be monitored by myself or another physician.
English

## 2025-05-19 NOTE — DISCHARGE NOTE PROVIDER - CARE PROVIDER_API CALL
Andi Zaragoza  Internal Medicine  [address] [place] [address], [address]  [place] [address]  Phone: [phone]  Fax: [phone]  Established Patient  Follow Up Time: 1 week

## 2025-05-19 NOTE — DISCHARGE NOTE PROVIDER - NSDCFUADDAPPT_GEN_ALL_CORE_FT
APPTS ARE READY TO BE MADE: [X ] YES    Best Family or Patient Contact (if needed):    Additional Information about above appointments (if needed):    1:   2:   3:     Other comments or requests:    APPTS ARE READY TO BE MADE: [X ] YES    Best Family or Patient Contact (if needed):    Additional Information about above appointments (if needed):    1:   2:   3:     Other comments or requests:     Internal Medicine/ Home Pulmonary:  The patient was not able to discuss care at that time. Outreached the phone numbers on file and left a voicemail for the patient to return our call. Lm for patients daughterDaxa.  Please call Inpatient Referral Program for scheduling [phone] APPTS ARE READY TO BE MADE: [X ] YES    Best Family or Patient Contact (if needed):    Additional Information about above appointments (if needed):    1: Home pulmonary   2: PCP  3:     Other comments or requests:     Internal Medicine/ Home Pulmonary:  The patient was not able to discuss care at that time. Outreached the phone numbers on file and left a voicemail for the patient to return our call. Lm for patients daughterDaxa.  Please call Inpatient Referral Program for scheduling [phone] APPTS ARE READY TO BE MADE: [X ] YES    Best Family or Patient Contact (if needed):    Additional Information about above appointments (if needed):    1: Home pulmonary   2: PCP  3:     Other comments or requests:     Internal Medicine/ Home Pulmonary:  Patient advised they did not want to proceed with scheduling appointments with the providers on their referrals. They will coordinate care on their own.   Please call Inpatient Referral Program for scheduling [phone]

## 2025-05-19 NOTE — DISCHARGE NOTE PROVIDER - HOSPITAL COURSE
HPI:  89-year-old female no PMH nonambulatory presenting with difficulty breathing  Patient was advised to come in from [facility] patient was found satting at 89 and wheezing.  Patient denies symptoms of shortness of breath palpitation chest pain weakness dizziness URI symptoms.  Patient denies history of asthma or any other pathology.  Patient denies dysuria increased urinary frequency.    Hospital Course:  Pt admitted for SOB. CT Chest noted: Right lower lobe bronchial wall thickening compatible with bronchitis/bronchiolitis. Several branching nodular opacities   peripherally at the right lung base are likely small impacted airways. Peripheral right scarring/fibrotic lung changes. Old right-sided rib fractures. Interval decrease in size of right-sided perihilar adenopathy . Redemonstrated bilobed exophytic mass along the lesser curvature of the stomach, similar in size compared to prior CT abdomen pelvis 5/8/2024. 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 Moderate hiatal hernia.  Pt was treated with course of IV abx and solu-medrol, transitioned to PO Prednisone.   Bcx neg. Finding of exophytic mass was noted on prior admission, pt and family aware. Not pursing further work-up at this time.     Important Medication Changes and Reason:    Active or Pending Issues Requiring Follow-up:    Advanced Directives:   [ ] Full code  [ ] DNR  [ ] Hospice    Discharge Diagnoses:         HPI:  89-year-old female no PMH nonambulatory presenting with difficulty breathing  Patient was advised to come in from [facility] patient was found satting at 89 and wheezing.  Patient denies symptoms of shortness of breath palpitation chest pain weakness dizziness URI symptoms.  Patient denies history of asthma or any other pathology.  Patient denies dysuria increased urinary frequency.    Hospital Course:  Pt admitted for SOB. CT Chest noted: Right lower lobe bronchial wall thickening compatible with bronchitis/bronchiolitis. Several branching nodular opacities   peripherally at the right lung base are likely small impacted airways. Peripheral right scarring/fibrotic lung changes. Old right-sided rib fractures. Interval decrease in size of right-sided perihilar adenopathy . Redemonstrated bilobed exophytic mass along the lesser curvature of the stomach, similar in size compared to prior CT abdomen pelvis 5/8/2024. Primary differential considerations again include a gastrointestinal stromal tumor. Moderate hiatal hernia.  Pt was treated with course of IV abx and solu-medrol, transitioned to PO Prednisone - will taper as follows: 40mg x 2 days, 30mg x 2 days, 20mg x 2 days, 10mg x 2 days then off. Bcx neg. Finding of exophytic mass was noted on prior admission, pt and family aware. Not pursing further work-up at this time.   Pt weaned off supplemental O2 and is saturating well on room air. Medically stable and cleared for discharge.  Discharge and med rec reviewed with attending.    Important Medication Changes and Reason:  - c/w Prednisone taper  - c/w Losartan 25mg QD    Active or Pending Issues Requiring Follow-up:  - f/u with PCP, Pulm    Advanced Directives:   [x ] Full code  [ ] DNR  [ ] Hospice    Discharge Diagnoses:  Pneumonia       HPI:  89-year-old female no PMH nonambulatory presenting with difficulty breathing  Patient was advised to come in from [facility] patient was found satting at 89 and wheezing.  Patient denies symptoms of shortness of breath palpitation chest pain weakness dizziness URI symptoms.  Patient denies history of asthma or any other pathology.  Patient denies dysuria increased urinary frequency.    Hospital Course:  Pt admitted for SOB. CT Chest noted: Right lower lobe bronchial wall thickening compatible with bronchitis/bronchiolitis. Several branching nodular opacities   peripherally at the right lung base are likely small impacted airways. Peripheral right scarring/fibrotic lung changes. Old right-sided rib fractures. Interval decrease in size of right-sided perihilar adenopathy . Redemonstrated bilobed exophytic mass along the lesser curvature of the stomach, similar in size compared to prior CT abdomen pelvis 5/8/2024. Primary differential considerations again include a gastrointestinal stromal tumor. Moderate hiatal hernia.  Pt was treated with course of IV abx and solu-medrol, transitioned to PO Prednisone - will taper as follows: 40mg x 2 days, 30mg x 2 days, 20mg x 2 days, 10mg x 2 days then off. Bcx neg. Finding of exophytic mass was noted on prior admission, pt and family aware. Not pursing further work-up at this time.   Pt weaned off supplemental O2 and is saturating well on room air. Medically stable and cleared for discharge.  Discharge and med rec reviewed with attending.    Important Medication Changes and Reason:  - c/w Prednisone taper  - c/w Losartan 25mg QD    Active or Pending Issues Requiring Follow-up:  - f/u with PCP, Pulm    Advanced Directives:   [x ] Full code  [ ] DNR  [ ] Hospice    Discharge Diagnoses:  Pneumonia  bronchiolitis       HPI:  89-year-old female no PMH nonambulatory presenting with difficulty breathing  Patient was advised to come in from [facility] patient was found satting at 89 and wheezing.  Patient denies symptoms of shortness of breath palpitation chest pain weakness dizziness URI symptoms.  Patient denies history of asthma or any other pathology.  Patient denies dysuria increased urinary frequency.    Hospital Course:  Pt admitted for SOB. CT Chest noted: Right lower lobe bronchial wall thickening compatible with bronchitis/bronchiolitis. Several branching nodular opacities   peripherally at the right lung base are likely small impacted airways. Peripheral right scarring/fibrotic lung changes. Old right-sided rib fractures. Interval decrease in size of right-sided perihilar adenopathy . Redemonstrated bilobed exophytic mass along the lesser curvature of the stomach, similar in size compared to prior CT abdomen pelvis 5/8/2024. Primary differential considerations again include a gastrointestinal stromal tumor. Moderate hiatal hernia.  Pt was treated with course of IV abx and solu-medrol, transitioned to PO Prednisone - will taper as follows: 40mg x 2 days, 30mg x 2 days, 20mg x 2 days, 10mg x 2 days then off. Bcx neg. Finding of exophytic mass was noted on prior admission, pt and family aware. Not pursing further work-up at this time.   Pt weaned off supplemental O2 and is saturating well on room air. Medically stable and cleared for discharge.  Discharge and med rec reviewed with attending.    Important Medication Changes and Reason:  - c/w Losartan 25mg QD    Active or Pending Issues Requiring Follow-up:  - f/u with PCP, Pulm    Advanced Directives:   [x ] Full code  [ ] DNR  [ ] Hospice    Discharge Diagnoses:  Pneumonia  bronchiolitis

## 2025-05-19 NOTE — DISCHARGE NOTE PROVIDER - NSDCCPCAREPLAN_GEN_ALL_CORE_FT
PRINCIPAL DISCHARGE DIAGNOSIS  Diagnosis: Pneumonia  Assessment and Plan of Treatment: Pneumonia is a lung infection that can cause a fever, cough, and trouble breathing.  Continue all antibiotics as ordered until complete.  Nutrition is important, eat small frequent meals.  Get lots of rest and drink fluids.  Call your health care provider upon arrival home from hospital and make a follow up appointment for one week.  If your cough worsens, you develop fever greater than 101', you have shaking chills, a fast heartbeat, trouble breathing and/or feel your are breathing much faster than usual, call your healthcare provider.  Make sure you wash your hands frequently.        SECONDARY DISCHARGE DIAGNOSES  Diagnosis: COPD exacerbation  Assessment and Plan of Treatment: Avoid environmental allergens and asthma triggers.  Participate in physical activity as tolerated.  Seek immediate medical attention if the shortness of breath does not improve with asthma treatments, or if you experience chest pain or palpitations.  Call an ambulance if your lips or nail beds become a bluish color.

## 2025-05-19 NOTE — DISCHARGE NOTE PROVIDER - NSDCMRMEDTOKEN_GEN_ALL_CORE_FT
ascorbic acid 500 mg oral tablet: 1 tab(s) orally once a day  ipratropium-albuterol 0.5 mg-2.5 mg/3 mL inhalation solution: 3 milliliter(s) by nebulizer every 6 hours  losartan 50 mg oral tablet: 1 tab(s) orally once a day  multivitamin:   Nebulizer machine: Please use as directed  predniSONE 10 mg oral tablet: 2 tab(s) orally once a day Starting 5/16, take 20 mg for 3 days  Then on 5/19, take 10 mg for 3 days  predniSONE 10 mg oral tablet: 1 tab(s) orally once a day  ProAir HFA 90 mcg/inh inhalation aerosol: 2 puff(s) inhaled every 4 hours as needed for  bronchospasm  Symbicort 160 mcg-4.5 mcg/inh inhalation aerosol: 2 puff(s) inhaled 2 times a day   ascorbic acid 500 mg oral tablet: 1 tab(s) orally once a day  benzonatate 100 mg oral capsule: 1 cap(s) orally every 8 hours as needed for  cough  ipratropium-albuterol 0.5 mg-2.5 mg/3 mL inhalation solution: 3 milliliter(s) by nebulizer every 6 hours  losartan 25 mg oral tablet: 1 tab(s) orally once a day  multivitamin:   Nebulizer machine: Please use as directed  predniSONE 10 mg oral tablet: 4 tab(s) orally once a day Take 40mg (4 tablets) once on 5/21, then  Take 30mg (3 tablets) once a day on 5/22-5/23, then  Take 20mg (2 tablets) once a day on 5/24-5/25, then  Take 10mg (1 tablet) once a day on 5/26-5/27  ProAir HFA 90 mcg/inh inhalation aerosol: 2 puff(s) inhaled every 4 hours as needed for  bronchospasm  Symbicort 160 mcg-4.5 mcg/inh inhalation aerosol: 2 puff(s) inhaled 2 times a day   ascorbic acid 500 mg oral tablet: 1 tab(s) orally once a day  benzonatate 100 mg oral capsule: 1 cap(s) orally every 8 hours as needed for  cough  ipratropium-albuterol 0.5 mg-2.5 mg/3 mL inhalation solution: 3 milliliter(s) by nebulizer every 6 hours  losartan 25 mg oral tablet: 1 tab(s) orally once a day  multivitamin:   Nebulizer machine: Please use as directed  ProAir HFA 90 mcg/inh inhalation aerosol: 2 puff(s) inhaled every 4 hours as needed for  bronchospasm  Symbicort 160 mcg-4.5 mcg/inh inhalation aerosol: 2 puff(s) inhaled 2 times a day

## 2025-05-20 RX ORDER — LOSARTAN POTASSIUM 100 MG/1
1 TABLET, FILM COATED ORAL
Qty: 30 | Refills: 0
Start: 2025-05-20 | End: 2025-06-18

## 2025-05-20 RX ORDER — BENZONATATE 100 MG
1 CAPSULE ORAL
Qty: 21 | Refills: 0
Start: 2025-05-20 | End: 2025-05-26

## 2025-05-20 RX ORDER — PREDNISONE 20 MG/1
4 TABLET ORAL
Qty: 16 | Refills: 0
Start: 2025-05-20 | End: 2025-05-26

## 2025-05-20 RX ADMIN — PREDNISONE 40 MILLIGRAM(S): 20 TABLET ORAL at 06:51

## 2025-05-20 RX ADMIN — DEXTROMETHORPHAN HBR, GUAIFENESIN 600 MILLIGRAM(S): 200 LIQUID ORAL at 06:52

## 2025-05-20 RX ADMIN — IPRATROPIUM BROMIDE AND ALBUTEROL SULFATE 3 MILLILITER(S): .5; 2.5 SOLUTION RESPIRATORY (INHALATION) at 00:09

## 2025-05-20 RX ADMIN — IPRATROPIUM BROMIDE AND ALBUTEROL SULFATE 3 MILLILITER(S): .5; 2.5 SOLUTION RESPIRATORY (INHALATION) at 17:36

## 2025-05-20 RX ADMIN — CEFTRIAXONE 100 MILLIGRAM(S): 500 INJECTION, POWDER, FOR SOLUTION INTRAMUSCULAR; INTRAVENOUS at 19:26

## 2025-05-20 RX ADMIN — Medication 100 MILLIGRAM(S): at 06:53

## 2025-05-20 RX ADMIN — HEPARIN SODIUM 5000 UNIT(S): 1000 INJECTION INTRAVENOUS; SUBCUTANEOUS at 06:50

## 2025-05-20 RX ADMIN — Medication 250 MILLIGRAM(S): at 21:14

## 2025-05-20 RX ADMIN — DEXTROMETHORPHAN HBR, GUAIFENESIN 600 MILLIGRAM(S): 200 LIQUID ORAL at 17:37

## 2025-05-20 RX ADMIN — IPRATROPIUM BROMIDE AND ALBUTEROL SULFATE 3 MILLILITER(S): .5; 2.5 SOLUTION RESPIRATORY (INHALATION) at 11:58

## 2025-05-20 RX ADMIN — Medication 100 MILLIGRAM(S): at 21:14

## 2025-05-20 RX ADMIN — Medication 100 MILLIGRAM(S): at 15:08

## 2025-05-20 RX ADMIN — IPRATROPIUM BROMIDE AND ALBUTEROL SULFATE 3 MILLILITER(S): .5; 2.5 SOLUTION RESPIRATORY (INHALATION) at 06:51

## 2025-05-20 RX ADMIN — LOSARTAN POTASSIUM 25 MILLIGRAM(S): 100 TABLET, FILM COATED ORAL at 06:52

## 2025-05-20 RX ADMIN — HEPARIN SODIUM 5000 UNIT(S): 1000 INJECTION INTRAVENOUS; SUBCUTANEOUS at 17:37

## 2025-05-20 RX ADMIN — IPRATROPIUM BROMIDE AND ALBUTEROL SULFATE 3 MILLILITER(S): .5; 2.5 SOLUTION RESPIRATORY (INHALATION) at 21:24

## 2025-05-21 LAB
CULTURE RESULTS: SIGNIFICANT CHANGE UP
CULTURE RESULTS: SIGNIFICANT CHANGE UP
SPECIMEN SOURCE: SIGNIFICANT CHANGE UP
SPECIMEN SOURCE: SIGNIFICANT CHANGE UP

## 2025-05-21 RX ADMIN — PREDNISONE 40 MILLIGRAM(S): 20 TABLET ORAL at 05:10

## 2025-05-21 RX ADMIN — IPRATROPIUM BROMIDE AND ALBUTEROL SULFATE 3 MILLILITER(S): .5; 2.5 SOLUTION RESPIRATORY (INHALATION) at 17:39

## 2025-05-21 RX ADMIN — DEXTROMETHORPHAN HBR, GUAIFENESIN 600 MILLIGRAM(S): 200 LIQUID ORAL at 05:10

## 2025-05-21 RX ADMIN — Medication 100 MILLIGRAM(S): at 05:10

## 2025-05-21 RX ADMIN — Medication 100 MILLIGRAM(S): at 13:19

## 2025-05-21 RX ADMIN — HEPARIN SODIUM 5000 UNIT(S): 1000 INJECTION INTRAVENOUS; SUBCUTANEOUS at 05:11

## 2025-05-21 RX ADMIN — HEPARIN SODIUM 5000 UNIT(S): 1000 INJECTION INTRAVENOUS; SUBCUTANEOUS at 17:39

## 2025-05-21 RX ADMIN — DEXTROMETHORPHAN HBR, GUAIFENESIN 600 MILLIGRAM(S): 200 LIQUID ORAL at 17:39

## 2025-05-21 RX ADMIN — LOSARTAN POTASSIUM 25 MILLIGRAM(S): 100 TABLET, FILM COATED ORAL at 05:10

## 2025-05-21 RX ADMIN — Medication 100 MILLIGRAM(S): at 21:52

## 2025-05-21 RX ADMIN — IPRATROPIUM BROMIDE AND ALBUTEROL SULFATE 3 MILLILITER(S): .5; 2.5 SOLUTION RESPIRATORY (INHALATION) at 13:19

## 2025-05-21 RX ADMIN — IPRATROPIUM BROMIDE AND ALBUTEROL SULFATE 3 MILLILITER(S): .5; 2.5 SOLUTION RESPIRATORY (INHALATION) at 05:12

## 2025-05-22 RX ADMIN — Medication 100 MILLIGRAM(S): at 06:20

## 2025-05-22 RX ADMIN — HEPARIN SODIUM 5000 UNIT(S): 1000 INJECTION INTRAVENOUS; SUBCUTANEOUS at 05:59

## 2025-05-22 RX ADMIN — DEXTROMETHORPHAN HBR, GUAIFENESIN 600 MILLIGRAM(S): 200 LIQUID ORAL at 18:25

## 2025-05-22 RX ADMIN — DEXTROMETHORPHAN HBR, GUAIFENESIN 600 MILLIGRAM(S): 200 LIQUID ORAL at 05:59

## 2025-05-22 RX ADMIN — PREDNISONE 40 MILLIGRAM(S): 20 TABLET ORAL at 05:59

## 2025-05-22 RX ADMIN — Medication 100 MILLIGRAM(S): at 13:39

## 2025-05-22 RX ADMIN — IPRATROPIUM BROMIDE AND ALBUTEROL SULFATE 3 MILLILITER(S): .5; 2.5 SOLUTION RESPIRATORY (INHALATION) at 13:40

## 2025-05-22 RX ADMIN — Medication 100 MILLIGRAM(S): at 21:57

## 2025-05-22 RX ADMIN — IPRATROPIUM BROMIDE AND ALBUTEROL SULFATE 3 MILLILITER(S): .5; 2.5 SOLUTION RESPIRATORY (INHALATION) at 00:05

## 2025-05-22 RX ADMIN — IPRATROPIUM BROMIDE AND ALBUTEROL SULFATE 3 MILLILITER(S): .5; 2.5 SOLUTION RESPIRATORY (INHALATION) at 05:59

## 2025-05-22 RX ADMIN — LOSARTAN POTASSIUM 25 MILLIGRAM(S): 100 TABLET, FILM COATED ORAL at 05:59

## 2025-05-22 NOTE — PHYSICAL THERAPY INITIAL EVALUATION ADULT - TRANSFER TRAINING, PT EVAL
Date/Time    WBC 3.3 05/19/2023 06:00 AM    RBC 3.35 05/19/2023 06:00 AM    HGB 8.2 05/19/2023 06:00 AM    HCT 27.4 05/19/2023 06:00 AM    MCV 81.8 05/19/2023 06:00 AM    RDW 15.5 08/18/2021 11:04 AM    PLT 78 05/19/2023 06:00 AM       CMP:   Lab Results   Component Value Date/Time     05/19/2023 06:00 AM    K 3.9 05/19/2023 06:00 AM    K 3.9 05/17/2023 01:20 AM     05/19/2023 06:00 AM    CO2 22 05/19/2023 06:00 AM    BUN 26 05/19/2023 06:00 AM    CREATININE 1.4 05/19/2023 06:00 AM    LABGLOM 40 05/19/2023 06:00 AM    GLUCOSE 150 05/19/2023 06:00 AM    PROT 6.8 08/18/2021 11:04 AM    CALCIUM 8.7 05/19/2023 06:00 AM    BILITOT 0.5 08/18/2021 11:04 AM    ALKPHOS 119 08/18/2021 11:04 AM    AST 37 08/18/2021 11:04 AM    ALT 20 08/18/2021 11:04 AM       POC Tests:   Recent Labs     05/19/23  1125   POCGLU 182*       Coags: No results found for: PROTIME, INR, APTT    HCG (If Applicable): No results found for: PREGTESTUR, PREGSERUM, HCG, HCGQUANT     ABGs: No results found for: PHART, PO2ART, AKR1BVM, ANX2AKF, BEART, [id number]     Type & Screen (If Applicable):  No results found for: LABABO, LABRH    Drug/Infectious Status (If Applicable):  No results found for: HIV, HEPCAB    COVID-19 Screening (If Applicable): No results found for: COVID19        Anesthesia Evaluation  Patient summary reviewed and Nursing notes reviewed no history of anesthetic complications:   Airway: Mallampati: II          Dental:          Pulmonary: breath sounds clear to auscultation                             Cardiovascular:  Exercise tolerance: no interval change,         ECG reviewed  Rhythm: regular  Rate: normal                    Neuro/Psych:               GI/Hepatic/Renal:   (+) renal disease: kidney stones, morbid obesity          Endo/Other:    (+) Diabeteswell controlled, using insulin, . Abdominal:             Vascular:           Other Findings:             Anesthesia Plan      general     ASA 3       Induction: GOAL: Patient will perform sit to stand Min A with assist of 1 for out of bed activities in 2 weeks.

## 2025-05-22 NOTE — PHYSICAL THERAPY INITIAL EVALUATION ADULT - PATIENT PROFILE REVIEW, REHAB EVAL
History reviewed. No pertinent past medical history.    History reviewed. No pertinent surgical history.    Review of patient's allergies indicates:  No Known Allergies    No current facility-administered medications on file prior to encounter.     Current Outpatient Medications on File Prior to Encounter   Medication Sig    loratadine (CLARITIN) 10 mg tablet Take 1 tablet (10 mg total) by mouth once daily.     Family History     Problem Relation (Age of Onset)    No Known Problems Mother, Father        Tobacco Use    Smoking status: Never Smoker    Smokeless tobacco: Never Used   Substance and Sexual Activity    Alcohol use: Yes     Comment: socially    Drug use: Never    Sexual activity: Not Currently     Review of Systems   Cardiovascular: Positive for chest pain and dyspnea on exertion.   Respiratory: Positive for shortness of breath.      Objective:     Vital Signs (Most Recent):  Temp: 98.3 °F (36.8 °C) (01/11/22 1648)  Pulse: 86 (01/12/22 0847)  Resp: (!) 23 (01/12/22 0847)  BP: 102/66 (01/12/22 0802)  SpO2: 96 % (01/12/22 0847) Vital Signs (24h Range):  Temp:  [98.2 °F (36.8 °C)-98.3 °F (36.8 °C)] 98.3 °F (36.8 °C)  Pulse:  [] 86  Resp:  [12-29] 23  SpO2:  [95 %-100 %] 96 %  BP: (102-150)/(51-75) 102/66     Weight: 94.3 kg (208 lb)  Body mass index is 38.04 kg/m².    SpO2: 96 %  O2 Device (Oxygen Therapy): room air      Intake/Output Summary (Last 24 hours) at 1/12/2022 1020  Last data filed at 1/11/2022 2230  Gross per 24 hour   Intake 150 ml   Output --   Net 150 ml       Lines/Drains/Airways     Peripheral Intravenous Line                 Peripheral IV - Single Lumen 01/11/22 1254 20 G;1 in Left;Medial Antecubital <1 day                Physical Exam    Significant Labs:   BMP:   Recent Labs   Lab 01/11/22  1153 01/12/22  0409    92    137   K 4.5 3.9    102   CO2 24 26   BUN 10 15   CREATININE 0.8 0.8   CALCIUM 9.3 8.8   MG  --  1.8   , CBC   Recent Labs   Lab 01/11/22  1159  01/11/22  1153 01/12/22  0409   WBC 16.56*  --  11.27   HGB 13.3  --  11.3*   HCT 38.4   < > 33.2*     --  346    < > = values in this interval not displayed.    and Troponin   Recent Labs   Lab 01/11/22  1153   TROPONINI <0.006       Significant Imaging: Echocardiogram: Transthoracic echo (TTE) complete (Cupid Only): TTE pending    yes

## 2025-05-22 NOTE — PHYSICAL THERAPY INITIAL EVALUATION ADULT - BALANCE TRAINING, PT EVAL
GOAL: Patient will improve standing balance to fair to improve stability while out of bed in 2 weeks.

## 2025-05-22 NOTE — PHYSICAL THERAPY INITIAL EVALUATION ADULT - ADDITIONAL COMMENTS
Patient lives at the [facility], transfers to and from chair daily with assist from aide to attend activities. Personal aide present M-F 6 hours/day and [facility] aides assist Sat/Sun. Patient states she ambulates with RW "up and down hallways when I feel like it" with assistance from aide.

## 2025-05-22 NOTE — PHYSICAL THERAPY INITIAL EVALUATION ADULT - PERTINENT HX OF CURRENT PROBLEM, REHAB EVAL
89-year-old female no PMH nonambulatory presenting with difficulty breathing. Patient was advised to come in from [facility] patient was found satting at 89 and wheezing.  Patient denies symptoms of shortness of breath palpitation chest pain weakness dizziness URI symptoms.  Patient denies history of asthma or any other pathology.  Patient denies dysuria increased urinary frequency.

## 2025-05-22 NOTE — PHYSICAL THERAPY INITIAL EVALUATION ADULT - GAIT TRAINING, PT EVAL
GOAL: Patient will ambulate 20ft Min A with assist of 1 using least restrictive device for community distances in 2 weeks

## 2025-05-23 ENCOUNTER — TRANSCRIPTION ENCOUNTER (OUTPATIENT)
Age: 89
End: 2025-05-23

## 2025-05-23 VITALS
SYSTOLIC BLOOD PRESSURE: 112 MMHG | OXYGEN SATURATION: 97 % | DIASTOLIC BLOOD PRESSURE: 76 MMHG | HEART RATE: 66 BPM | RESPIRATION RATE: 18 BRPM | TEMPERATURE: 98 F

## 2025-05-23 RX ADMIN — DEXTROMETHORPHAN HBR, GUAIFENESIN 600 MILLIGRAM(S): 200 LIQUID ORAL at 06:41

## 2025-05-23 RX ADMIN — Medication 100 MILLIGRAM(S): at 06:41

## 2025-05-23 RX ADMIN — LOSARTAN POTASSIUM 25 MILLIGRAM(S): 100 TABLET, FILM COATED ORAL at 06:41

## 2025-05-23 RX ADMIN — IPRATROPIUM BROMIDE AND ALBUTEROL SULFATE 3 MILLILITER(S): .5; 2.5 SOLUTION RESPIRATORY (INHALATION) at 06:41

## 2025-05-23 RX ADMIN — HEPARIN SODIUM 5000 UNIT(S): 1000 INJECTION INTRAVENOUS; SUBCUTANEOUS at 06:41

## 2025-05-23 RX ADMIN — IPRATROPIUM BROMIDE AND ALBUTEROL SULFATE 3 MILLILITER(S): .5; 2.5 SOLUTION RESPIRATORY (INHALATION) at 00:06

## 2025-05-23 RX ADMIN — Medication 100 MILLIGRAM(S): at 13:44

## 2025-05-23 NOTE — PROGRESS NOTE ADULT - REASON FOR ADMISSION
sob/ tachycardia

## 2025-05-23 NOTE — DISCHARGE NOTE NURSING/CASE MANAGEMENT/SOCIAL WORK - FINANCIAL ASSISTANCE
[facility] provides services at a reduced cost to those who are determined to be eligible through [facility]’s financial assistance program. Information regarding [facility]’s financial assistance program can be found by going to [email or web address] or by calling [phone].

## 2025-05-23 NOTE — PROGRESS NOTE ADULT - SUBJECTIVE AND OBJECTIVE BOX
Date of Service: 05-17-25 @ 06:13           CARDIOLOGY     PROGRESS  NOTE   ________________________________________________  CHIEF COMPLAINT:Patient is a 89y old  Female who presents with a chief complaint of sob/ tachycardia (16 May 2025 15:08)  doing better  	  REVIEW OF SYSTEMS:  CONSTITUTIONAL: No fever, weight loss, or fatigue  EYES: No eye pain, visual disturbances, or discharge  ENT:  No difficulty hearing, tinnitus, vertigo; No sinus or throat pain  NECK: No pain or stiffness  RESPIRATORY: No cough, wheezing, chills or hemoptysis; decrease  Shortness of Breath  CARDIOVASCULAR: No chest pain, palpitations, passing out, dizziness, or leg swelling  GASTROINTESTINAL: No abdominal or epigastric pain. No nausea, vomiting, or hematemesis; No diarrhea or constipation. No melena or hematochezia.  GENITOURINARY: No dysuria, frequency, hematuria, or incontinence  NEUROLOGICAL: No headaches, memory loss, loss of strength, numbness, or tremors  SKIN: No itching, burning, rashes, or lesions   LYMPH Nodes: No enlarged glands  ENDOCRINE: No heat or cold intolerance; No hair loss  MUSCULOSKELETAL: No joint pain or swelling; No muscle, back, or extremity pain  PSYCHIATRIC: No depression, anxiety, mood swings, or difficulty sleeping  HEME/LYMPH: No easy bruising, or bleeding gums  ALLERGY AND IMMUNOLOGIC: No hives or eczema	    x[ ] All others negative	  [ ] Unable to obtain    PHYSICAL EXAM:  T(C): 36.3 (05-17-25 @ 04:46), Max: 37.2 (05-16-25 @ 20:33)  HR: 68 (05-17-25 @ 04:46) (68 - 99)  BP: 149/83 (05-17-25 @ 04:46) (122/70 - 149/83)  RR: 18 (05-17-25 @ 04:46) (18 - 19)  SpO2: 96% (05-17-25 @ 04:46) (95% - 98%)  Wt(kg): --  I&O's Summary    15 May 2025 07:01  -  16 May 2025 07:00  --------------------------------------------------------  IN: 120 mL / OUT: 0 mL / NET: 120 mL    16 May 2025 07:01  -  17 May 2025 06:13  --------------------------------------------------------  IN: 960 mL / OUT: 1150 mL / NET: -190 mL        Appearance: Normal	  HEENT:   Normal oral mucosa, PERRL, EOMI	  Lymphatic: No lymphadenopathy  Cardiovascular: Normal S1 S2, No JVD, + murmurs, No edema  Respiratory:rhonchi  Psychiatry: A & O x 3, Mood & affect appropriate  Gastrointestinal:  Soft, Non-tender, + BS	  Skin: No rashes, No ecchymoses, No cyanosis	  Neurologic: Non-focal  Extremities No clubbing, cyanosis or edema  Vascular: Peripheral pulses palpable 2+ bilaterally    MEDICATIONS  (STANDING):  albuterol    90 MICROgram(s) HFA Inhaler 2 Puff(s) Inhalation every 8 hours  albuterol/ipratropium for Nebulization 3 milliLiter(s) Nebulizer every 6 hours  azithromycin  IVPB 500 milliGRAM(s) IV Intermittent every 24 hours  cefTRIAXone   IVPB 1000 milliGRAM(s) IV Intermittent every 24 hours  guaiFENesin  milliGRAM(s) Oral every 12 hours  heparin   Injectable 5000 Unit(s) SubCutaneous every 12 hours  losartan 25 milliGRAM(s) Oral daily  methylPREDNISolone sodium succinate Injectable 20 milliGRAM(s) IV Push every 8 hours      TELEMETRY: 	    ECG:  	  RADIOLOGY:  OTHER: 	  	  LABS:	 	    CARDIAC MARKERS:                            9.2    9.99  )-----------( 340      ( 16 May 2025 07:02 )             31.5     05-16    138  |  100  |  15  ----------------------------<  131[H]  5.0   |  28  |  1.02    Ca    9.1      16 May 2025 07:06    TPro  7.1  /  Alb  3.8  /  TBili  0.1[L]  /  DBili  x   /  AST  18  /  ALT  11  /  AlkPhos  118  05-15    proBNP:   Lipid Profile:   HgA1c:   TSH:   PT/INR - ( 15 May 2025 19:32 )   PT: 11.6 sec;   INR: 1.01 ratio         PTT - ( 15 May 2025 19:32 )  PTT:26.1 sec    < from: CT Chest No Cont (05.15.25 @ 20:44) >  Right lower lobe bronchial wall thickening compatible with   bronchitis/bronchiolitis. Several branching nodular opacities   peripherally at the right lung base are likely small impacted airways.    Peripheral right scarring/fibrotic lung changes.    Old right-sided rib fractures.    Interval decrease in sizeof right-sided perihilar adenopathy .    Redemonstrated bilobed exophytic mass along the lesser curvature of the   stomach, similar in size compared to prior CT abdomen pelvis 5/8/2024.   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        Assessment and plan  ---------------------------  89-year-old female no PMH nonambulatory presenting with difficulty breathing  Patient was advised to come in from [facility] patient was found satting at 89 and wheezing.  Patient denies symptoms of shortness of breath palpitation chest pain weakness dizziness URI symptoms.  Patient denies history of asthma or any other pathology.  Patient denies dysuria increased urinary frequency.  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      	        
Date of Service: 05-18-25 @ 08:44           CARDIOLOGY     PROGRESS  NOTE   ________________________________________________  CHIEF COMPLAINT:Patient is a 89y old  Female who presents with a chief complaint of sob/ tachycardia (17 May 2025 07:17)  no complain  	  REVIEW OF SYSTEMS:  CONSTITUTIONAL: No fever, weight loss, or fatigue  EYES: No eye pain, visual disturbances, or discharge  ENT:  No difficulty hearing, tinnitus, vertigo; No sinus or throat pain  NECK: No pain or stiffness  RESPIRATORY: + cough, wheezing, no chills or hemoptysis; No Shortness of Breath  CARDIOVASCULAR: No chest pain, palpitations, passing out, dizziness, or leg swelling  GASTROINTESTINAL: No abdominal or epigastric pain. No nausea, vomiting, or hematemesis; No diarrhea or constipation. No melena or hematochezia.  GENITOURINARY: No dysuria, frequency, hematuria, or incontinence  NEUROLOGICAL: No headaches, memory loss, loss of strength, numbness, or tremors  SKIN: No itching, burning, rashes, or lesions   LYMPH Nodes: No enlarged glands  ENDOCRINE: No heat or cold intolerance; No hair loss  MUSCULOSKELETAL: No joint pain or swelling; No muscle, back, or extremity pain  PSYCHIATRIC: No depression, anxiety, mood swings, or difficulty sleeping  HEME/LYMPH: No easy bruising, or bleeding gums  ALLERGY AND IMMUNOLOGIC: No hives or eczema	    [x ] All others negative	  [ ] Unable to obtain    PHYSICAL EXAM:  T(C): 36.5 (05-18-25 @ 04:57), Max: 36.9 (05-17-25 @ 19:31)  HR: 70 (05-18-25 @ 04:57) (70 - 89)  BP: 155/72 (05-18-25 @ 04:57) (132/79 - 161/85)  RR: 18 (05-18-25 @ 04:57) (18 - 18)  SpO2: 97% (05-18-25 @ 04:57) (94% - 97%)  Wt(kg): --  I&O's Summary    17 May 2025 07:01  -  18 May 2025 07:00  --------------------------------------------------------  IN: 1140 mL / OUT: 1750 mL / NET: -610 mL        Appearance: Normal	  HEENT:   Normal oral mucosa, PERRL, EOMI	  Lymphatic: No lymphadenopathy  Cardiovascular: Normal S1 S2, No JVD, + murmurs, No edema  Respiratory: rhonchi  Psychiatry: A & O x 3, Mood & affect appropriate  Gastrointestinal:  Soft, Non-tender, + BS	  Skin: No rashes, No ecchymoses, No cyanosis	  Neurologic: Non-focal  Extremities: Normal range of motion, No clubbing, cyanosis or edema  Vascular: Peripheral pulses palpable 2+ bilaterally    MEDICATIONS  (STANDING):  albuterol    90 MICROgram(s) HFA Inhaler 2 Puff(s) Inhalation every 8 hours  albuterol/ipratropium for Nebulization 3 milliLiter(s) Nebulizer every 6 hours  azithromycin  IVPB 500 milliGRAM(s) IV Intermittent every 24 hours  benzonatate 100 milliGRAM(s) Oral every 8 hours  cefTRIAXone   IVPB 1000 milliGRAM(s) IV Intermittent every 24 hours  guaiFENesin  milliGRAM(s) Oral every 12 hours  heparin   Injectable 5000 Unit(s) SubCutaneous every 12 hours  losartan 25 milliGRAM(s) Oral daily  methylPREDNISolone sodium succinate Injectable 20 milliGRAM(s) IV Push two times a day      TELEMETRY: 	    ECG:  	  RADIOLOGY:  OTHER: 	  	  LABS:	 	    CARDIAC MARKERS:      proBNP:   Lipid Profile:   HgA1c:   TSH:         Assessment and plan  ---------------------------  89-year-old female no PMH nonambulatory presenting with difficulty breathing  Patient was advised to come in from [facility] patient was found satting at 89 and wheezing.  Patient denies symptoms of shortness of breath palpitation chest pain weakness dizziness URI symptoms.  Patient denies history of asthma or any other pathology.  Patient denies dysuria increased urinary frequency.  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    	        
Date of Service: 05-22-25 @ 07:45           CARDIOLOGY     PROGRESS  NOTE   ________________________________________________  CHIEF COMPLAINT:Patient is a 89y old  Female who presents with a chief complaint of sob/ tachycardia (21 May 2025 15:39)  doing better  	  REVIEW OF SYSTEMS:  CONSTITUTIONAL: No fever, weight loss, or fatigue  EYES: No eye pain, visual disturbances, or discharge  ENT:  No difficulty hearing, tinnitus, vertigo; No sinus or throat pain  NECK: No pain or stiffness  RESPIRATORY: No cough, wheezing, chills or hemoptysis; No Shortness of Breath  CARDIOVASCULAR: No chest pain, palpitations, passing out, dizziness, or leg swelling  GASTROINTESTINAL: No abdominal or epigastric pain. No nausea, vomiting, or hematemesis; No diarrhea or constipation. No melena or hematochezia.  GENITOURINARY: No dysuria, frequency, hematuria, or incontinence  NEUROLOGICAL: No headaches, memory loss, loss of strength, numbness, or tremors  SKIN: No itching, burning, rashes, or lesions   LYMPH Nodes: No enlarged glands  ENDOCRINE: No heat or cold intolerance; No hair loss  MUSCULOSKELETAL: No joint pain or swelling; No muscle, back, or extremity pain  PSYCHIATRIC: No depression, anxiety, mood swings, or difficulty sleeping  HEME/LYMPH: No easy bruising, or bleeding gums  ALLERGY AND IMMUNOLOGIC: No hives or eczema	    [ x] All others negative	  [ ] Unable to obtain    PHYSICAL EXAM:  T(C): 36.7 (05-22-25 @ 04:15), Max: 36.8 (05-21-25 @ 14:22)  HR: 88 (05-22-25 @ 04:15) (88 - 98)  BP: 119/80 (05-22-25 @ 04:15) (115/69 - 122/74)  RR: 18 (05-22-25 @ 04:15) (18 - 18)  SpO2: 94% (05-22-25 @ 04:15) (92% - 94%)  Wt(kg): --  I&O's Summary    21 May 2025 07:01  -  22 May 2025 07:00  --------------------------------------------------------  IN: 960 mL / OUT: 1450 mL / NET: -490 mL        Appearance: Normal	  HEENT:   Normal oral mucosa, PERRL, EOMI	  Lymphatic: No lymphadenopathy  Cardiovascular: Normal S1 S2, No JVD, + murmurs, No edema  Respiratory: rhonchi  Psychiatry: A & O x 3, Mood & affect appropriate  Gastrointestinal:  Soft, Non-tender, + BS	  Skin: No rashes, No ecchymoses, No cyanosis	  Neurologic: Non-focal  Extremities: Normal range of motion, No clubbing, cyanosis or edema  Vascular: Peripheral pulses palpable 2+ bilaterally    MEDICATIONS  (STANDING):  albuterol    90 MICROgram(s) HFA Inhaler 2 Puff(s) Inhalation every 8 hours  albuterol/ipratropium for Nebulization 3 milliLiter(s) Nebulizer every 6 hours  benzonatate 100 milliGRAM(s) Oral every 8 hours  guaiFENesin  milliGRAM(s) Oral every 12 hours  heparin   Injectable 5000 Unit(s) SubCutaneous every 12 hours  losartan 25 milliGRAM(s) Oral daily  predniSONE   Tablet 40 milliGRAM(s) Oral daily      TELEMETRY: 	    ECG:  	  RADIOLOGY:  OTHER: 	  	  LABS:	 	    CARDIAC MARKERS:        proBNP:   Lipid Profile:   HgA1c:   TSH:         Assessment and plan  ---------------------------  89-year-old female no PMH nonambulatory presenting with difficulty breathing  Patient was advised to come in from [facility] patient was found satting at 89 and wheezing.  Patient denies symptoms of shortness of breath palpitation chest pain weakness dizziness URI symptoms.  Patient denies history of asthma or any other pathology.  Patient denies dysuria increased urinary frequency.  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    	        
      ---___---___---___---___---___---___ ---___---___---___---___---___---___---___---___---                  M E D I C A L   A T T E N D I N G   P R O G R E S S   N O T E  ---___---___---___---___---___---___ ---___---___---___---___---___---___---___---___---        ================================================    ++CHIEF COMPLAINT:   Patient is a 89y old  Female who presents with a chief complaint of sob/ tachycardia (18 May 2025 08:44)      Pneumonia due to infectious organism      ---___---___---___---___---___---  PAST MEDICAL / Surgical  HISTORY:  PAST MEDICAL & SURGICAL HISTORY:  No pertinent past medical history      No significant past surgical history          ---___---___---___---___---___---  FAMILY HISTORY:   FAMILY HISTORY:        ---___---___---___---___---___---  ALLERGIES:   Allergies    No Known Allergies    Intolerances        ---___---___---___---___---___---  MEDICATIONS:  MEDICATIONS  (STANDING):  albuterol    90 MICROgram(s) HFA Inhaler 2 Puff(s) Inhalation every 8 hours  albuterol/ipratropium for Nebulization 3 milliLiter(s) Nebulizer every 6 hours  azithromycin  IVPB 500 milliGRAM(s) IV Intermittent every 24 hours  benzonatate 100 milliGRAM(s) Oral every 8 hours  cefTRIAXone   IVPB 1000 milliGRAM(s) IV Intermittent every 24 hours  guaiFENesin  milliGRAM(s) Oral every 12 hours  heparin   Injectable 5000 Unit(s) SubCutaneous every 12 hours  losartan 25 milliGRAM(s) Oral daily  methylPREDNISolone sodium succinate Injectable 20 milliGRAM(s) IV Push two times a day    MEDICATIONS  (PRN):      ---___---___---___---___---___---  REVIEW OF SYSTEM:    unable to obtain   ---___---___---___---___---___---  VITAL SIGNS:  89y , CAPILLARY BLOOD GLUCOSE        T(C): 36.8 (05-18-25 @ 20:05), Max: 36.9 (05-17-25 @ 23:59)  HR: 106 (05-18-25 @ 20:05) (70 - 106)  BP: 145/78 (05-18-25 @ 20:05) (131/84 - 163/85)  RR: 18 (05-18-25 @ 20:05) (18 - 18)  SpO2: 96% (05-18-25 @ 20:05) (92% - 97%)  ---___---___---___---___---___---  PHYSICAL EXAM:    GEN: A&O X 0 , NAD , comfortable  HEENT: NCAT, PERRL, MMM, hearing intact  Neck: supple , no JVD  CVS: S1S2 , regular , No M/R/G appreciated  PULM: CTA B/L,  no W/R/R appreciated  ABD.: soft. non tender, non distended,  bowel sounds present  Extrem: intact pulses , no edema   Derm: No rash , no ecchymoses  PSYCH : normal mood,  no delusion not anxious     ---___---___---___---___---___---            LAB AND IMAGING:                                                  [All pertinent / recent Imaging reviewed]         ---___---___---___---___---___---___ ---___---___---___---___---                         A S S E S S M E N T   A N D   P L A N :      HEALTH ISSUES - PROBLEM Dx:    89-year-old female      no PMH nonambulatory /  h/o  gallstones./  had  pna  2024     presenting with difficulty breathing/   was  sent   from [facility] patient ith sob/  sat  oft 89 and wheezing.       denies  palpitation chest pain weakness dizziness URI symptoms.   denies history of asthma. dysuria increased urinary frequency.       sob/  hypoxia.       wheezing /  rvp, negative     pt  is   afebrile  wbc  12,000       cxr. normal    ct chest.  r/o  pna     iv steroid.   nebs. 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    --------------------------------------------  Case discussed with   Education given on   ___________________________  Thank you,  Kole Prieto  [phone]
      ---___---___---___---___---___---___ ---___---___---___---___---___---___---___---___---                  M E D I C A L   A T T E N D I N G   P R O G R E S S   N O T E  ---___---___---___---___---___---___ ---___---___---___---___---___---___---___---___---        ================================================    ++CHIEF COMPLAINT:   Patient is a 89y old  Female who presents with a chief complaint of sob/ tachycardia (20 May 2025 07:22)      Pneumonia due to infectious organism      ---___---___---___---___---___---  PAST MEDICAL / Surgical  HISTORY:  PAST MEDICAL & SURGICAL HISTORY:  No pertinent past medical history      No significant past surgical history          ---___---___---___---___---___---  FAMILY HISTORY:   FAMILY HISTORY:        ---___---___---___---___---___---  ALLERGIES:   Allergies    No Known Allergies    Intolerances        ---___---___---___---___---___---  MEDICATIONS:  MEDICATIONS  (STANDING):  albuterol    90 MICROgram(s) HFA Inhaler 2 Puff(s) Inhalation every 8 hours  albuterol/ipratropium for Nebulization 3 milliLiter(s) Nebulizer every 6 hours  azithromycin  IVPB 500 milliGRAM(s) IV Intermittent every 24 hours  benzonatate 100 milliGRAM(s) Oral every 8 hours  guaiFENesin  milliGRAM(s) Oral every 12 hours  heparin   Injectable 5000 Unit(s) SubCutaneous every 12 hours  losartan 25 milliGRAM(s) Oral daily  predniSONE   Tablet 40 milliGRAM(s) Oral daily    MEDICATIONS  (PRN):      ---___---___---___---___---___---  REVIEW OF SYSTEM:    GEN: no fever, no chills, no pain  RESP: no SOB, no cough, no sputum  CVS: no chest pain, no palpitations, no edema  GI: no abdominal pain, no nausea, no vomiting, no constipation, no diarrhea  : no dysurea, no frequency, no hematurea  Neuro: no headache, no dizziness  PSYCH: no anxiety, no depression  Derm : no itching, no rash    ---___---___---___---___---___---  VITAL SIGNS:  89y , CAPILLARY BLOOD GLUCOSE        T(C): 36.8 (05-20-25 @ 19:41), Max: 36.8 (05-20-25 @ 12:02)  HR: 99 (05-20-25 @ 19:41) (70 - 100)  BP: 137/65 (05-20-25 @ 19:41) (119/77 - 141/77)  RR: 18 (05-20-25 @ 19:41) (18 - 18)  SpO2: 99% (05-20-25 @ 19:41) (93% - 99%)  ---___---___---___---___---___---  PHYSICAL EXAM:    GEN: A&O X 3 , NAD , comfortable  HEENT: NCAT, PERRL, MMM, hearing intact  Neck: supple , no JVD  CVS: S1S2 , regular , No M/R/G appreciated  PULM: CTA B/L,  no W/R/R appreciated  ABD.: soft. non tender, non distended,  bowel sounds present  Extrem: intact pulses , no edema   Derm: No rash , no ecchymoses  PSYCH : normal mood,  no delusion not anxious     ---___---___---___---___---___---            LAB AND IMAGING:                                                  [All pertinent / recent Imaging reviewed]         ---___---___---___---___---___---___ ---___---___---___---___---                         A S S E S S M E N T   A N D   P L A N :      HEALTH ISSUES - PROBLEM Dx:    pneumonia on antibiotic and  albuterol and prednisone taper      htn stable continue meds      -GI/DVT Prophylaxis. as ordered                 ___________________________  Thank you,  Kole Prieto  [phone]
      ---___---___---___---___---___---___ ---___---___---___---___---___---___---___---___---                  M E D I C A L   A T T E N D I N G   P R O G R E S S   N O T E  ---___---___---___---___---___---___ ---___---___---___---___---___---___---___---___---        ================================================    ++CHIEF COMPLAINT:   Patient is a 89y old  Female who presents with a chief complaint of sob/ tachycardia (21 May 2025 06:15)      Pneumonia due to infectious organism      ---___---___---___---___---___---  PAST MEDICAL / Surgical  HISTORY:  PAST MEDICAL & SURGICAL HISTORY:  No pertinent past medical history      No significant past surgical history          ---___---___---___---___---___---  FAMILY HISTORY:   FAMILY HISTORY:        ---___---___---___---___---___---  ALLERGIES:   Allergies    No Known Allergies    Intolerances        ---___---___---___---___---___---  MEDICATIONS:  MEDICATIONS  (STANDING):  albuterol    90 MICROgram(s) HFA Inhaler 2 Puff(s) Inhalation every 8 hours  albuterol/ipratropium for Nebulization 3 milliLiter(s) Nebulizer every 6 hours  benzonatate 100 milliGRAM(s) Oral every 8 hours  guaiFENesin  milliGRAM(s) Oral every 12 hours  heparin   Injectable 5000 Unit(s) SubCutaneous every 12 hours  losartan 25 milliGRAM(s) Oral daily  predniSONE   Tablet 40 milliGRAM(s) Oral daily    MEDICATIONS  (PRN):      ---___---___---___---___---___---  REVIEW OF SYSTEM:    GEN: no fever, no chills, no pain  RESP: no SOB, no cough, no sputum  CVS: no chest pain, no palpitations, no edema  GI: no abdominal pain, no nausea, no vomiting, no constipation, no diarrhea  : no dysurea, no frequency, no hematurea  Neuro: no headache, no dizziness  PSYCH: no anxiety, no depression  Derm : no itching, no rash    ---___---___---___---___---___---  VITAL SIGNS:  89y , CAPILLARY BLOOD GLUCOSE        T(C): 36.8 (05-21-25 @ 14:22), Max: 36.8 (05-20-25 @ 19:41)  HR: 91 (05-21-25 @ 14:22) (75 - 99)  BP: 115/69 (05-21-25 @ 14:22) (115/69 - 137/65)  RR: 18 (05-21-25 @ 14:22) (18 - 18)  SpO2: 92% (05-21-25 @ 14:22) (92% - 99%)  ---___---___---___---___---___---  PHYSICAL EXAM:    GEN: A&O X 3 , NAD , comfortable  HEENT: NCAT, PERRL, MMM, hearing intact  Neck: supple , no JVD  CVS: S1S2 , regular , No M/R/G appreciated  PULM: CTA B/L,  no W/R/R appreciated still wheezing  ABD.: soft. non tender, non distended,  bowel sounds present  Extrem: intact pulses , no edema   Derm: No rash , no ecchymoses  PSYCH : normal mood,  no delusion not anxious     ---___---___---___---___---___---            LAB AND IMAGING:                                                  [All pertinent / recent Imaging reviewed]         ---___---___---___---___---___---___ ---___---___---___---___---                         A S S E S S M E N T   A N D   P L A N :      HEALTH ISSUES - PROBLEM Dx:    pneumonia Continued wheezing patient is still on antibiotics as well as steroids and is on oxygen via nasal cannula    htn  stable             -GI/DVT Prophylaxis.  as ordered  --------------------------------------------  Case discussed with   Education given on   ___________________________  Thank you,  Kole Prieto  [phone]
      ---___---___---___---___---___---___ ---___---___---___---___---___---___---___---___---                  M E D I C A L   A T T E N D I N G   P R O G R E S S   N O T E  ---___---___---___---___---___---___ ---___---___---___---___---___---___---___---___---        ================================================    ++CHIEF COMPLAINT:   Patient is a 89y old  Female who presents with a chief complaint of sob/ tachycardia (22 May 2025 07:41)      Pneumonia due to infectious organism      ---___---___---___---___---___---  PAST MEDICAL / Surgical  HISTORY:  PAST MEDICAL & SURGICAL HISTORY:  No pertinent past medical history      No significant past surgical history          ---___---___---___---___---___---  FAMILY HISTORY:   FAMILY HISTORY:        ---___---___---___---___---___---  ALLERGIES:   Allergies    No Known Allergies    Intolerances        ---___---___---___---___---___---  MEDICATIONS:  MEDICATIONS  (STANDING):  albuterol    90 MICROgram(s) HFA Inhaler 2 Puff(s) Inhalation every 8 hours  albuterol/ipratropium for Nebulization 3 milliLiter(s) Nebulizer every 6 hours  benzonatate 100 milliGRAM(s) Oral every 8 hours  guaiFENesin  milliGRAM(s) Oral every 12 hours  heparin   Injectable 5000 Unit(s) SubCutaneous every 12 hours  losartan 25 milliGRAM(s) Oral daily  predniSONE   Tablet 40 milliGRAM(s) Oral daily    MEDICATIONS  (PRN):      ---___---___---___---___---___---  REVIEW OF SYSTEM:    GEN: no fever, no chills, no pain  RESP: no SOB, no cough, no sputum  CVS: no chest pain, no palpitations, no edema  GI: no abdominal pain, no nausea, no vomiting, no constipation, no diarrhea  : no dysurea, no frequency, no hematurea  Neuro: no headache, no dizziness  PSYCH: no anxiety, no depression  Derm : no itching, no rash    ---___---___---___---___---___---  VITAL SIGNS:  89y , CAPILLARY BLOOD GLUCOSE        T(C): 36.3 (05-22-25 @ 13:38), Max: 36.7 (05-21-25 @ 20:03)  HR: 98 (05-22-25 @ 13:40) (78 - 101)  BP: 108/69 (05-22-25 @ 13:38) (108/69 - 135/78)  RR: 18 (05-22-25 @ 13:40) (18 - 19)  SpO2: 92% (05-22-25 @ 13:40) (87% - 94%)  ---___---___---___---___---___---  PHYSICAL EXAM:    GEN: A&O X 3 , NAD , comfortable  HEENT: NCAT, PERRL, MMM, hearing intact  Neck: supple , no JVD  CVS: S1S2 , regular , No M/R/G appreciated  PULM: CTA B/L,  no W/R/R appreciated  ABD.: soft. non tender, non distended,  bowel sounds present  Extrem: intact pulses , no edema   Derm: No rash , no ecchymoses  PSYCH : normal mood,  no delusion not anxious     ---___---___---___---___---___---            LAB AND IMAGING:                                                  [All pertinent / recent Imaging reviewed]         ---___---___---___---___---___---___ ---___---___---___---___---                         A S S E S S M E N T   A N D   P L A N :      HEALTH ISSUES - PROBLEM Dx:  pneumonia imporved   wheezing improved   htn stable    -GI/DVT Prophylaxis. ordered  start dc planning     ___________________________  Thank you,  Kole Prieto  [phone]
  date of service: 25 @ 07:17  [facility]  REVIEW OF SYSTEMS:  CONSTITUTIONAL: No fever,  no  weight loss  ENT:  No  tinnitus,   no   vertigo  NECK: No pain or stiffness  RESPIRATORY: No cough, wheezing, chills or hemoptysis;    No Shortness of Breath  CARDIOVASCULAR: No chest pain, palpitations, dizziness  GASTROINTESTINAL: No abdominal or epigastric pain. No nausea, vomiting, or hematemesis; No diarrhea  No melena or hematochezia.  GENITOURINARY: No dysuria, frequency, hematuria, or incontinence  NEUROLOGICAL: No headaches  SKIN: No itching,  no   rash  LYMPH Nodes: No enlarged glands  ENDOCRINE: No heat or cold intolerance  MUSCULOSKELETAL: No joint pain or swelling  PSYCHIATRIC: No depression, anxiety  HEME/LYMPH: No easy bruising, or bleeding gums  ALLERGY AND IMMUNOLOGIC: No hives or eczema	    MEDICATIONS  (STANDING):  albuterol    90 MICROgram(s) HFA Inhaler 2 Puff(s) Inhalation every 8 hours  albuterol/ipratropium for Nebulization 3 milliLiter(s) Nebulizer every 6 hours  azithromycin  IVPB 500 milliGRAM(s) IV Intermittent every 24 hours  cefTRIAXone   IVPB 1000 milliGRAM(s) IV Intermittent every 24 hours  guaiFENesin  milliGRAM(s) Oral every 12 hours  heparin   Injectable 5000 Unit(s) SubCutaneous every 12 hours  losartan 25 milliGRAM(s) Oral daily  methylPREDNISolone sodium succinate Injectable 20 milliGRAM(s) IV Push two times a day    MEDICATIONS  (PRN):      Vital Signs Last 24 Hrs  T(C): 36.3 (17 May 2025 04:46), Max: 37.2 (16 May 2025 20:33)  T(F): 97.3 (17 May 2025 04:46), Max: 98.9 (16 May 2025 20:33)  HR: 68 (17 May 2025 04:46) (68 - 99)  BP: 149/83 (17 May 2025 04:46) (122/70 - 149/83)  BP(mean): --  RR: 18 (17 May 2025 04:46) (18 - 19)  SpO2: 96% (17 May 2025 04:46) (95% - 98%)    Parameters below as of 17 May 2025 04:46  Patient On (Oxygen Delivery Method): nasal cannula  O2 Flow (L/min): 2    CAPILLARY BLOOD GLUCOSE        I&O's Summary    16 May 2025 07:01  -  17 May 2025 07:00  --------------------------------------------------------  IN: 960 mL / OUT: 1450 mL / NET: -490 mL          Appearance: Normal	  HEENT:   Normal oral mucosa, PERRL, EOMI	  Lymphatic: No lymphadenopathy  Cardiovascular: Normal S1 S2, No JVD  Respiratory: Lungs clear to auscultation	  Gastrointestinal:  Soft, Non-tender, + BS	  Skin: No rash, No ecchymoses	  Extremities:     LABS:                        9.2    9.99  )-----------( 340      ( 16 May 2025 07:02 )             31.5     05-16    138  |  100  |  15  ----------------------------<  131[H]  5.0   |  28  |  1.02    Ca    9.1      16 May 2025 07:06    TPro  7.1  /  Alb  3.8  /  TBili  0.1[L]  /  DBili  x   /  AST  18  /  ALT  11  /  AlkPhos  118  05-15    PT/INR - ( 15 May 2025 19:32 )   PT: 11.6 sec;   INR: 1.01 ratio         PTT - ( 15 May 2025 19:32 )  PTT:26.1 sec      Urinalysis Basic - ( 16 May 2025 11:53 )    Color: Yellow / Appearance: Clear / S.009 / pH: x  Gluc: x / Ketone: x  / Bili: Negative / Urobili: 0.2 mg/dL   Blood: x / Protein: Negative mg/dL / Nitrite: Negative   Leuk Esterase: Negative / RBC: x / WBC x   Sq Epi: x / Non Sq Epi: x / Bacteria: x          05-15 @ 19:20  4.7  39              Consultant(s) Notes Reviewed:      Care Discussed with Consultants/Other Providers:    
Date of Service: 05-16-25 @ 09:15           CARDIOLOGY     PROGRESS  NOTE   ________________________________________________  CHIEF COMPLAINT:Patient is a 89y old  Female who presents with a chief complaint of wheezing/  hypoxia (15 May 2025 21:06)  no complain  	  REVIEW OF SYSTEMS:  CONSTITUTIONAL: No fever, weight loss, or fatigue  EYES: No eye pain, visual disturbances, or discharge  ENT:  No difficulty hearing, tinnitus, vertigo; No sinus or throat pain  NECK: No pain or stiffness  RESPIRATORY: No cough, wheezing, chills or hemoptysis; No Shortness of Breath  CARDIOVASCULAR: No chest pain, palpitations, passing out, dizziness, or leg swelling  GASTROINTESTINAL: No abdominal or epigastric pain. No nausea, vomiting, or hematemesis; No diarrhea or constipation. No melena or hematochezia.  GENITOURINARY: No dysuria, frequency, hematuria, or incontinence  NEUROLOGICAL: No headaches, memory loss, loss of strength, numbness, or tremors  SKIN: No itching, burning, rashes, or lesions   LYMPH Nodes: No enlarged glands  ENDOCRINE: No heat or cold intolerance; No hair loss  MUSCULOSKELETAL: No joint pain or swelling; No muscle, back, or extremity pain  PSYCHIATRIC: No depression, anxiety, mood swings, or difficulty sleeping  HEME/LYMPH: No easy bruising, or bleeding gums  ALLERGY AND IMMUNOLOGIC: No hives or eczema	    [ x] All others negative	  [ ] Unable to obtain    PHYSICAL EXAM:  T(C): 36.6 (05-16-25 @ 07:52), Max: 37.2 (05-16-25 @ 00:28)  HR: 69 (05-16-25 @ 07:52) (69 - 116)  BP: 141/78 (05-16-25 @ 07:52) (112/72 - 153/81)  RR: 18 (05-16-25 @ 07:52) (18 - 24)  SpO2: 96% (05-16-25 @ 07:52) (94% - 98%)  Wt(kg): --  I&O's Summary    15 May 2025 07:01  -  16 May 2025 07:00  --------------------------------------------------------  IN: 120 mL / OUT: 0 mL / NET: 120 mL        Appearance: Normal	  HEENT:   Normal oral mucosa, PERRL, EOMI	  Lymphatic: No lymphadenopathy  Cardiovascular: Normal S1 S2, No JVD, +murmurs, No edema  Respiratory:rhonchi  Gastrointestinal:  Soft, Non-tender, + BS	  Skin: No rashes, No ecchymoses, No cyanosis	  Extremities: Normal range of motion, No clubbing, cyanosis or edema  Vascular: Peripheral pulses palpable 2+ bilaterally    MEDICATIONS  (STANDING):  albuterol    90 MICROgram(s) HFA Inhaler 2 Puff(s) Inhalation every 8 hours  albuterol/ipratropium for Nebulization 3 milliLiter(s) Nebulizer every 6 hours  azithromycin  IVPB 500 milliGRAM(s) IV Intermittent every 24 hours  cefTRIAXone   IVPB 1000 milliGRAM(s) IV Intermittent every 24 hours  heparin   Injectable 5000 Unit(s) SubCutaneous every 12 hours  losartan 25 milliGRAM(s) Oral daily  methylPREDNISolone sodium succinate Injectable 20 milliGRAM(s) IV Push every 8 hours      TELEMETRY: 	    ECG:  	  RADIOLOGY:  OTHER: 	  	  LABS:	 	    CARDIAC MARKERS:                          9.2    9.99  )-----------( 340      ( 16 May 2025 07:02 )             31.5     05-16    138  |  100  |  15  ----------------------------<  131[H]  5.0   |  28  |  1.02    Ca    9.1      16 May 2025 07:06    TPro  7.1  /  Alb  3.8  /  TBili  0.1[L]  /  DBili  x   /  AST  18  /  ALT  11  /  AlkPhos  118  05-15    proBNP:   Lipid Profile:   HgA1c:   TSH:   PT/INR - ( 15 May 2025 19:32 )   PT: 11.6 sec;   INR: 1.01 ratio         PTT - ( 15 May 2025 19:32 )  PTT:26.1 sec    < from: CT Chest No Cont (05.15.25 @ 20:44) >  Right lower lobe bronchial wall thickening compatible with   bronchitis/bronchiolitis. Several branching nodular opacities   peripherally at the right lung base are likely small impacted airways.    Peripheral right scarring/fibrotic lung changes.    Old right-sided rib fractures.    Interval decrease in sizeof right-sided perihilar adenopathy .    Redemonstrated bilobed exophytic mass along the lesser curvature of the   stomach, similar in size compared to prior CT abdomen pelvis 5/8/2024.   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        -advanced GI consulted due to concern for GIST on CT imaging.  However, upon my interview, patient does not even "want to hear about a mass in her stomach" and refuses any further workup or treatment of the findings on her CT.  She is aware this could be an underlying malignancy, that if left untreated, could ultimately result in her death  -will sign off at this time; please call back if patient wishes to pursue further workup and optimized for endoscopic procedure  -remainder per primary team    FluA/FluB/RSV/COVID PCR (05.15.25 @ 19:31)    SARS-CoV-2 Result: NotDete: This molecular assay uses polymerase chain reaction (PCR) to detect  influenza A virus, influenza B virus, respiratory syncytial virus (RSV),  and SARS-CoV-2 (COVID-19). Test results should be correlated with  clinical presentation, patient history, and epidemiology.   Influenza A Result: [facility]   Influenza B Result: [facility]   Resp Syn Virus Result: NotDete   Source Respiratory: Nasopharyngeal        Assessment and plan  ---------------------------  89-year-old female no PMH nonambulatory presenting with difficulty breathing  Patient was advised to come in from [facility] patient was found satting at 88 and wheezing.  Patient denies symptoms of shortness of breath palpitation chest pain weakness dizziness URI symptoms.  Patient denies history of asthma or any other pathology.  Patient denies dysuria increased urinary frequency.  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    	        
Date of Service: 05-21-25 @ 06:15           CARDIOLOGY     PROGRESS  NOTE   ________________________________________________  CHIEF COMPLAINT:Patient is a 89y old  Female who presents with a chief complaint of sob/ tachycardia (20 May 2025 19:44)  doing better  	  REVIEW OF SYSTEMS:  CONSTITUTIONAL: No fever, weight loss, or fatigue  EYES: No eye pain, visual disturbances, or discharge  ENT:  No difficulty hearing, tinnitus, vertigo; No sinus or throat pain  NECK: No pain or stiffness  RESPIRATORY: No cough, wheezing, chills or hemoptysis; decrease  Shortness of Breath  CARDIOVASCULAR: No chest pain, palpitations, passing out, dizziness, or leg swelling  GASTROINTESTINAL: No abdominal or epigastric pain. No nausea, vomiting, or hematemesis; No diarrhea or constipation. No melena or hematochezia.  GENITOURINARY: No dysuria, frequency, hematuria, or incontinence  NEUROLOGICAL: No headaches, memory loss, loss of strength, numbness, or tremors  SKIN: No itching, burning, rashes, or lesions   LYMPH Nodes: No enlarged glands  ENDOCRINE: No heat or cold intolerance; No hair loss  MUSCULOSKELETAL: No joint pain or swelling; No muscle, back, or extremity pain  PSYCHIATRIC: No depression, anxiety, mood swings, or difficulty sleeping  HEME/LYMPH: No easy bruising, or bleeding gums  ALLERGY AND IMMUNOLOGIC: No hives or eczema	    [x ] All others negative	  [ ] Unable to obtain    PHYSICAL EXAM:  T(C): 36.7 (05-21-25 @ 04:36), Max: 36.8 (05-20-25 @ 12:02)  HR: 75 (05-21-25 @ 04:36) (75 - 99)  BP: 123/59 (05-21-25 @ 04:36) (119/77 - 137/65)  RR: 18 (05-21-25 @ 04:36) (18 - 18)  SpO2: 97% (05-21-25 @ 04:36) (93% - 99%)  Wt(kg): --  I&O's Summary    19 May 2025 07:01  -  20 May 2025 07:00  --------------------------------------------------------  IN: 960 mL / OUT: 1300 mL / NET: -340 mL    20 May 2025 07:01  -  21 May 2025 06:15  --------------------------------------------------------  IN: 1160 mL / OUT: 2101 mL / NET: -941 mL        Appearance: Normal	  HEENT:   Normal oral mucosa, PERRL, EOMI	  Lymphatic: No lymphadenopathy  Cardiovascular: Normal S1 S2, No JVD, + murmurs, No edema  Respiratory: rhonchi  Psychiatry: A & O x 3, Mood & affect appropriate  Gastrointestinal:  Soft, Non-tender, + BS	  Skin: No rashes, No ecchymoses, No cyanosis	  Extremities: Normal range of motion, No clubbing, cyanosis or edema  Vascular: Peripheral pulses palpable 2+ bilaterally    MEDICATIONS  (STANDING):  albuterol    90 MICROgram(s) HFA Inhaler 2 Puff(s) Inhalation every 8 hours  albuterol/ipratropium for Nebulization 3 milliLiter(s) Nebulizer every 6 hours  benzonatate 100 milliGRAM(s) Oral every 8 hours  guaiFENesin  milliGRAM(s) Oral every 12 hours  heparin   Injectable 5000 Unit(s) SubCutaneous every 12 hours  losartan 25 milliGRAM(s) Oral daily  predniSONE   Tablet 40 milliGRAM(s) Oral daily      TELEMETRY: 	    ECG:  	  RADIOLOGY:  OTHER: 	  	  LABS:	 	    CARDIAC MARKERS:    proBNP:   Lipid Profile:   HgA1c:   TSH:         Assessment and plan  ---------------------------  89-year-old female no PMH nonambulatory presenting with difficulty breathing  Patient was advised to come in from [facility] patient was found satting at 89 and wheezing.  Patient denies symptoms of shortness of breath palpitation chest pain weakness dizziness URI symptoms.  Patient denies history of asthma or any other pathology.  Patient denies dysuria increased urinary frequency.  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    	        
      ---___---___---___---___---___---___ ---___---___---___---___---___---___---___---___---                  M E D I C A L   A T T E N D I N G   P R O G R E S S   N O T E  ---___---___---___---___---___---___ ---___---___---___---___---___---___---___---___---        ================================================    ++CHIEF COMPLAINT:   Patient is a 89y old  Female who presents with a chief complaint of sob/ tachycardia (19 May 2025 14:25)      Pneumonia due to infectious organism      ---___---___---___---___---___---  PAST MEDICAL / Surgical  HISTORY:  PAST MEDICAL & SURGICAL HISTORY:  No pertinent past medical history      No significant past surgical history          ---___---___---___---___---___---  FAMILY HISTORY:   FAMILY HISTORY:        ---___---___---___---___---___---  ALLERGIES:   Allergies    No Known Allergies    Intolerances        ---___---___---___---___---___---  MEDICATIONS:  MEDICATIONS  (STANDING):  albuterol    90 MICROgram(s) HFA Inhaler 2 Puff(s) Inhalation every 8 hours  albuterol/ipratropium for Nebulization 3 milliLiter(s) Nebulizer every 6 hours  azithromycin  IVPB 500 milliGRAM(s) IV Intermittent every 24 hours  benzonatate 100 milliGRAM(s) Oral every 8 hours  cefTRIAXone   IVPB 1000 milliGRAM(s) IV Intermittent every 24 hours  guaiFENesin  milliGRAM(s) Oral every 12 hours  heparin   Injectable 5000 Unit(s) SubCutaneous every 12 hours  losartan 25 milliGRAM(s) Oral daily  predniSONE   Tablet 40 milliGRAM(s) Oral daily    MEDICATIONS  (PRN):      ---___---___---___---___---___---  REVIEW OF SYSTEM:    GEN: no fever, no chills, no pain  RESP: no SOB, no cough, no sputum  CVS: no chest pain, no palpitations, no edema  GI: no abdominal pain, no nausea, no vomiting, no constipation, no diarrhea  : no dysurea, no frequency, no hematurea  Neuro: no headache, no dizziness  PSYCH: no anxiety, no depression  Derm : no itching, no rash    ---___---___---___---___---___---  VITAL SIGNS:  89y , CAPILLARY BLOOD GLUCOSE        T(C): 36.7 (05-19-25 @ 19:58), Max: 36.9 (05-19-25 @ 04:48)  HR: 100 (05-19-25 @ 19:58) (85 - 100)  BP: 141/77 (05-19-25 @ 19:58) (131/82 - 165/79)  RR: 18 (05-19-25 @ 19:58) (18 - 18)  SpO2: 97% (05-19-25 @ 19:58) (92% - 97%)  ---___---___---___---___---___---  PHYSICAL EXAM:    GEN: A&O X 0, NAD , comfortable  HEENT: NCAT, PERRL, MMM, hearing intact  Neck: supple , no JVD  CVS: S1S2 , regular , No M/R/G appreciated  PULM: CTA B/L,  no W/R/R appreciated  ABD.: soft. non tender, non distended,  bowel sounds present  Extrem: intact pulses , no edema   Derm: No rash , no ecchymoses  PSYCH : normal mood,  no delusion not anxious     ---___---___---___---___---___---            LAB AND IMAGING:                                                  [All pertinent / recent Imaging reviewed]         ---___---___---___---___---___---___ ---___---___---___---___---                         A S S E S S M E N T   A N D   P L A N :      HEALTH ISSUES - PROBLEM Dx:    pneumonia on antibiotic and  albuterol and prednisone taper      htn stable continue meds      -GI/DVT Prophylaxis. as ordered    -------  ___________________________  Thank you,  Kole Prieto  [phone]
Date of Service: 05-19-25 @ 06:14           CARDIOLOGY     PROGRESS  NOTE   ________________________________________________  CHIEF COMPLAINT:Patient is a 89y old  Female who presents with a chief complaint of sob/ tachycardia (18 May 2025 08:44)  doing better  	  REVIEW OF SYSTEMS:  CONSTITUTIONAL: No fever, weight loss, or fatigue  EYES: No eye pain, visual disturbances, or discharge  ENT:  No difficulty hearing, tinnitus, vertigo; No sinus or throat pain  NECK: No pain or stiffness  RESPIRATORY: No cough, wheezing, chills or hemoptysis; decrease  Shortness of Breath  CARDIOVASCULAR: No chest pain, palpitations, passing out, dizziness, or leg swelling  GASTROINTESTINAL: No abdominal or epigastric pain. No nausea, vomiting, or hematemesis; No diarrhea or constipation. No melena or hematochezia.  GENITOURINARY: No dysuria, frequency, hematuria, or incontinence  NEUROLOGICAL: No headaches, memory loss, loss of strength, numbness, or tremors  SKIN: No itching, burning, rashes, or lesions   LYMPH Nodes: No enlarged glands  ENDOCRINE: No heat or cold intolerance; No hair loss  MUSCULOSKELETAL: No joint pain or swelling; No muscle, back, or extremity pain  PSYCHIATRIC: No depression, anxiety, mood swings, or difficulty sleeping  HEME/LYMPH: No easy bruising, or bleeding gums  ALLERGY AND IMMUNOLOGIC: No hives or eczema	    [ x] All others negative	  [ ] Unable to obtain    PHYSICAL EXAM:  T(C): 36.9 (05-19-25 @ 04:48), Max: 36.9 (05-18-25 @ 13:34)  HR: 85 (05-19-25 @ 04:48) (80 - 106)  BP: 131/82 (05-19-25 @ 04:48) (131/82 - 163/85)  RR: 18 (05-19-25 @ 04:48) (18 - 18)  SpO2: 97% (05-19-25 @ 04:48) (92% - 97%)  Wt(kg): --  I&O's Summary    17 May 2025 07:01  -  18 May 2025 07:00  --------------------------------------------------------  IN: 1140 mL / OUT: 1750 mL / NET: -610 mL    18 May 2025 07:01  -  19 May 2025 06:14  --------------------------------------------------------  IN: 1120 mL / OUT: 1050 mL / NET: 70 mL        Appearance: Normal	  HEENT:   Normal oral mucosa, PERRL, EOMI	  Lymphatic: No lymphadenopathy  Cardiovascular: Normal S1 S2, No JVD, + murmurs, No edema  Respiratory: rhonchi  Psychiatry: A & O x 3, Mood & affect appropriate  Gastrointestinal:  Soft, Non-tender, + BS	  Skin: No rashes, No ecchymoses, No cyanosis	  Neurologic: Non-focal  Extremities: Normal range of motion, No clubbing, cyanosis or edema  Vascular: Peripheral pulses palpable 2+ bilaterally    MEDICATIONS  (STANDING):  albuterol    90 MICROgram(s) HFA Inhaler 2 Puff(s) Inhalation every 8 hours  albuterol/ipratropium for Nebulization 3 milliLiter(s) Nebulizer every 6 hours  azithromycin  IVPB 500 milliGRAM(s) IV Intermittent every 24 hours  benzonatate 100 milliGRAM(s) Oral every 8 hours  cefTRIAXone   IVPB 1000 milliGRAM(s) IV Intermittent every 24 hours  guaiFENesin  milliGRAM(s) Oral every 12 hours  heparin   Injectable 5000 Unit(s) SubCutaneous every 12 hours  losartan 25 milliGRAM(s) Oral daily  methylPREDNISolone sodium succinate Injectable 20 milliGRAM(s) IV Push two times a day      TELEMETRY: 	    ECG:  	  RADIOLOGY:  OTHER: 	  	  LABS:	 	    CARDIAC MARKERS:    proBNP:   Lipid Profile:   HgA1c:   TSH:       < from: CT Chest No Cont (05.15.25 @ 20:44) >  Right lower lobe bronchial wall thickening compatible with   bronchitis/bronchiolitis. Several branching nodular opacities   peripherally at the right lung base are likely small impacted airways.    Peripheral right scarring/fibrotic lung changes.    Old right-sided rib fractures.    Interval decrease in sizeof right-sided perihilar adenopathy .    Redemonstrated bilobed exophytic mass along the lesser curvature of the   stomach, similar in size compared to prior CT abdomen pelvis 5/8/2024.   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    Moderate hiatal hernia.      Assessment and plan  ---------------------------  89-year-old female no PMH nonambulatory presenting with difficulty breathing  Patient was advised to come in from [facility] patient was found satting at 89 and wheezing.  Patient denies symptoms of shortness of breath palpitation chest pain weakness dizziness URI symptoms.  Patient denies history of asthma or any other pathology.  Patient denies dysuria increased urinary frequency.  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  	        
Date of Service: 05-20-25 @ 07:22           CARDIOLOGY     PROGRESS  NOTE   ________________________________________________  CHIEF COMPLAINT:Patient is a 89y old  Female who presents with a chief complaint of sob/ tachycardia (19 May 2025 20:47)  doing better  	  REVIEW OF SYSTEMS:  CONSTITUTIONAL: No fever, weight loss, or fatigue  EYES: No eye pain, visual disturbances, or discharge  ENT:  No difficulty hearing, tinnitus, vertigo; No sinus or throat pain  NECK: No pain or stiffness  RESPIRATORY: No cough, wheezing, chills or hemoptysis; No Shortness of Breath  CARDIOVASCULAR: No chest pain, palpitations, passing out, dizziness, or leg swelling  GASTROINTESTINAL: No abdominal or epigastric pain. No nausea, vomiting, or hematemesis; No diarrhea or constipation. No melena or hematochezia.  GENITOURINARY: No dysuria, frequency, hematuria, or incontinence  NEUROLOGICAL: No headaches, memory loss, loss of strength, numbness, or tremors  SKIN: No itching, burning, rashes, or lesions   LYMPH Nodes: No enlarged glands  ENDOCRINE: No heat or cold intolerance; No hair loss  MUSCULOSKELETAL: No joint pain or swelling; No muscle, back, or extremity pain  PSYCHIATRIC: No depression, anxiety, mood swings, or difficulty sleeping  HEME/LYMPH: No easy bruising, or bleeding gums  ALLERGY AND IMMUNOLOGIC: No hives or eczema	    [x ] All others negative	  [ ] Unable to obtain    PHYSICAL EXAM:  T(C): 36.4 (05-20-25 @ 05:50), Max: 36.7 (05-19-25 @ 11:12)  HR: 70 (05-20-25 @ 05:50) (70 - 100)  BP: 129/83 (05-20-25 @ 05:50) (129/83 - 165/79)  RR: 18 (05-20-25 @ 05:50) (18 - 18)  SpO2: 97% (05-20-25 @ 05:50) (92% - 97%)  Wt(kg): --  I&O's Summary    19 May 2025 07:01  -  20 May 2025 07:00  --------------------------------------------------------  IN: 960 mL / OUT: 1300 mL / NET: -340 mL        Appearance: Normal	  HEENT:   Normal oral mucosa, PERRL, EOMI	  Lymphatic: No lymphadenopathy  Cardiovascular: Normal S1 S2, No JVD, + murmurs, No edema  Respiratory: rhonchi  Psychiatry: A & O x 3, Mood & affect appropriate  Gastrointestinal:  Soft, Non-tender, + BS	  Skin: No rashes, No ecchymoses, No cyanosis	  Neurologic: Non-focal  Extremities: Normal range of motion, No clubbing, cyanosis or edema  Vascular: Peripheral pulses palpable 2+ bilaterally    MEDICATIONS  (STANDING):  albuterol    90 MICROgram(s) HFA Inhaler 2 Puff(s) Inhalation every 8 hours  albuterol/ipratropium for Nebulization 3 milliLiter(s) Nebulizer every 6 hours  azithromycin  IVPB 500 milliGRAM(s) IV Intermittent every 24 hours  benzonatate 100 milliGRAM(s) Oral every 8 hours  cefTRIAXone   IVPB 1000 milliGRAM(s) IV Intermittent every 24 hours  guaiFENesin  milliGRAM(s) Oral every 12 hours  heparin   Injectable 5000 Unit(s) SubCutaneous every 12 hours  losartan 25 milliGRAM(s) Oral daily  predniSONE   Tablet 40 milliGRAM(s) Oral daily      TELEMETRY: 	    ECG:  	  RADIOLOGY:  OTHER: 	  	  LABS:	 	    CARDIAC MARKERS:    proBNP:   Lipid Profile:   HgA1c:   TSH:         Assessment and plan  ---------------------------  89-year-old female no PMH nonambulatory presenting with difficulty breathing  Patient was advised to come in from [facility] patient was found satting at 89 and wheezing.  Patient denies symptoms of shortness of breath palpitation chest pain weakness dizziness URI symptoms.  Patient denies history of asthma or any other pathology.  Patient denies dysuria increased urinary frequency.  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    	        
Date of Service: 05-23-25 @ 08:46           CARDIOLOGY     PROGRESS  NOTE   ________________________________________________  CHIEF COMPLAINT:Patient is a 89y old  Female who presents with a chief complaint of sob/ tachycardia (22 May 2025 17:39)  doing well  	  REVIEW OF SYSTEMS:  CONSTITUTIONAL: No fever, weight loss, or fatigue  EYES: No eye pain, visual disturbances, or discharge  ENT:  No difficulty hearing, tinnitus, vertigo; No sinus or throat pain  NECK: No pain or stiffness  RESPIRATORY: No cough, wheezing, chills or hemoptysis; No Shortness of Breath  CARDIOVASCULAR: No chest pain, palpitations, passing out, dizziness, or leg swelling  GASTROINTESTINAL: No abdominal or epigastric pain. No nausea, vomiting, or hematemesis; No diarrhea or constipation. No melena or hematochezia.  GENITOURINARY: No dysuria, frequency, hematuria, or incontinence  NEUROLOGICAL: No headaches, memory loss, loss of strength, numbness, or tremors  SKIN: No itching, burning, rashes, or lesions   LYMPH Nodes: No enlarged glands  ENDOCRINE: No heat or cold intolerance; No hair loss  MUSCULOSKELETAL: No joint pain or swelling; No muscle, back, or extremity pain  PSYCHIATRIC: No depression, anxiety, mood swings, or difficulty sleeping  HEME/LYMPH: No easy bruising, or bleeding gums  ALLERGY AND IMMUNOLOGIC: No hives or eczema	    [x ] All others negative	  [ ] Unable to obtain    PHYSICAL EXAM:  T(C): 36.3 (05-23-25 @ 05:18), Max: 36.7 (05-22-25 @ 09:06)  HR: 68 (05-23-25 @ 05:18) (68 - 101)  BP: 113/66 (05-23-25 @ 05:18) (106/68 - 135/78)  RR: 18 (05-23-25 @ 05:18) (18 - 19)  SpO2: 98% (05-23-25 @ 05:18) (87% - 98%)  Wt(kg): --  I&O's Summary    22 May 2025 07:01  -  23 May 2025 07:00  --------------------------------------------------------  IN: 840 mL / OUT: 1450 mL / NET: -610 mL        Appearance: Normal	  HEENT:   Normal oral mucosa, PERRL, EOMI	  Lymphatic: No lymphadenopathy  Cardiovascular: Normal S1 S2, No JVD, + murmurs, No edema  Respiratory: rhonchi  Psychiatry: A & O x 3, Mood & affect appropriate  Gastrointestinal:  Soft, Non-tender, + BS	  Skin: No rashes, No ecchymoses, No cyanosis	  Extremities: Normal range of motion, No clubbing, cyanosis or edema  Vascular: Peripheral pulses palpable 2+ bilaterally    MEDICATIONS  (STANDING):  albuterol    90 MICROgram(s) HFA Inhaler 2 Puff(s) Inhalation every 8 hours  albuterol/ipratropium for Nebulization 3 milliLiter(s) Nebulizer every 6 hours  benzonatate 100 milliGRAM(s) Oral every 8 hours  guaiFENesin  milliGRAM(s) Oral every 12 hours  heparin   Injectable 5000 Unit(s) SubCutaneous every 12 hours  losartan 25 milliGRAM(s) Oral daily  predniSONE   Tablet 40 milliGRAM(s) Oral daily      TELEMETRY: 	    ECG:  	  RADIOLOGY:  OTHER: 	  	  LABS:	 	    CARDIAC MARKERS:    proBNP:   Lipid Profile:   HgA1c:   TSH:         Assessment and plan  ---------------------------  89-year-old female no PMH nonambulatory presenting with difficulty breathing  Patient was advised to come in from [facility] patient was found satting at 89 and wheezing.  Patient denies symptoms of shortness of breath palpitation chest pain weakness dizziness URI symptoms.  Patient denies history of asthma or any other pathology.  Patient denies dysuria increased urinary frequency.  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

## 2025-05-23 NOTE — DISCHARGE NOTE NURSING/CASE MANAGEMENT/SOCIAL WORK - NSDCPEFALRISK_GEN_ALL_CORE
For information on Fall & Injury Prevention, visit: [email or web address] OR  [email or web address] OR  https://www.cdc.gov/steadi/patient.html

## 2025-05-23 NOTE — DISCHARGE NOTE NURSING/CASE MANAGEMENT/SOCIAL WORK - NSDCFUADDAPPT_GEN_ALL_CORE_FT
APPTS ARE READY TO BE MADE: [X ] YES    Best Family or Patient Contact (if needed):    Additional Information about above appointments (if needed):    1: Home pulmonary   2: PCP  3:     Other comments or requests:     Internal Medicine/ Home Pulmonary:  Patient advised they did not want to proceed with scheduling appointments with the providers on their referrals. They will coordinate care on their own.   Please call Inpatient Referral Program for scheduling [phone]

## 2025-05-23 NOTE — PROGRESS NOTE ADULT - PROVIDER SPECIALTY LIST ADULT
Cardiology
Family Medicine
Family Medicine
Internal Medicine
Cardiology
Family Medicine
Cardiology
Family Medicine
Family Medicine

## 2025-05-23 NOTE — DISCHARGE NOTE NURSING/CASE MANAGEMENT/SOCIAL WORK - PATIENT PORTAL LINK FT
You can access the FollowMyHealth Patient Portal offered by [facility] by registering at the following website: [email or web address]. By joining [employer]’s FollowMyHealth portal, you will also be able to view your health information using other applications (apps) compatible with our system.

## 2025-05-28 ENCOUNTER — TRANSCRIPTION ENCOUNTER (OUTPATIENT)
Age: 89
End: 2025-05-28

## 2025-06-02 PROCEDURE — 85018 HEMOGLOBIN: CPT

## 2025-06-02 PROCEDURE — 85025 COMPLETE CBC W/AUTO DIFF WBC: CPT

## 2025-06-02 PROCEDURE — 87637 SARSCOV2&INF A&B&RSV AMP PRB: CPT

## 2025-06-02 PROCEDURE — 93005 ELECTROCARDIOGRAM TRACING: CPT

## 2025-06-02 PROCEDURE — 99285 EMERGENCY DEPT VISIT HI MDM: CPT | Mod: 25

## 2025-06-02 PROCEDURE — 85730 THROMBOPLASTIN TIME PARTIAL: CPT

## 2025-06-02 PROCEDURE — 85610 PROTHROMBIN TIME: CPT

## 2025-06-02 PROCEDURE — 84295 ASSAY OF SERUM SODIUM: CPT

## 2025-06-02 PROCEDURE — 82803 BLOOD GASES ANY COMBINATION: CPT

## 2025-06-02 PROCEDURE — 82330 ASSAY OF CALCIUM: CPT

## 2025-06-02 PROCEDURE — 82435 ASSAY OF BLOOD CHLORIDE: CPT

## 2025-06-02 PROCEDURE — 71250 CT THORAX DX C-: CPT

## 2025-06-02 PROCEDURE — 97161 PT EVAL LOW COMPLEX 20 MIN: CPT

## 2025-06-02 PROCEDURE — 87040 BLOOD CULTURE FOR BACTERIA: CPT

## 2025-06-02 PROCEDURE — 80053 COMPREHEN METABOLIC PANEL: CPT

## 2025-06-02 PROCEDURE — 80048 BASIC METABOLIC PNL TOTAL CA: CPT

## 2025-06-02 PROCEDURE — 82947 ASSAY GLUCOSE BLOOD QUANT: CPT

## 2025-06-02 PROCEDURE — 71045 X-RAY EXAM CHEST 1 VIEW: CPT

## 2025-06-02 PROCEDURE — 85027 COMPLETE CBC AUTOMATED: CPT

## 2025-06-02 PROCEDURE — 84132 ASSAY OF SERUM POTASSIUM: CPT

## 2025-06-02 PROCEDURE — 94640 AIRWAY INHALATION TREATMENT: CPT

## 2025-06-02 PROCEDURE — 81003 URINALYSIS AUTO W/O SCOPE: CPT

## 2025-06-02 PROCEDURE — 96374 THER/PROPH/DIAG INJ IV PUSH: CPT

## 2025-06-02 PROCEDURE — 83605 ASSAY OF LACTIC ACID: CPT

## 2025-06-02 PROCEDURE — 85014 HEMATOCRIT: CPT

## 2025-06-02 NOTE — ED ADULT TRIAGE NOTE - PATIENT ON (OXYGEN DELIVERY METHOD)
Medication passed protocol.     Medication: levothyroxine 125 mg passed protocol.   Last office visit date: 12/4/24  Next appointment scheduled?: Yes    nasal cannula

## 2025-06-05 ENCOUNTER — TRANSCRIPTION ENCOUNTER (OUTPATIENT)
Age: 89
End: 2025-06-05

## 2025-06-10 ENCOUNTER — TRANSCRIPTION ENCOUNTER (OUTPATIENT)
Age: 89
End: 2025-06-10